# Patient Record
Sex: FEMALE | Race: WHITE | Employment: UNEMPLOYED | URBAN - METROPOLITAN AREA
[De-identification: names, ages, dates, MRNs, and addresses within clinical notes are randomized per-mention and may not be internally consistent; named-entity substitution may affect disease eponyms.]

---

## 2017-07-31 RX ORDER — SIMVASTATIN 20 MG
TABLET ORAL
Qty: 90 TABLET | Refills: 3 | Status: SHIPPED | OUTPATIENT
Start: 2017-07-31 | End: 2021-08-19

## 2017-09-22 ENCOUNTER — OFFICE VISIT (OUTPATIENT)
Dept: FAMILY MEDICINE CLINIC | Age: 47
End: 2017-09-22

## 2017-09-22 VITALS
HEIGHT: 69 IN | RESPIRATION RATE: 18 BRPM | TEMPERATURE: 98.6 F | HEART RATE: 83 BPM | WEIGHT: 271 LBS | BODY MASS INDEX: 40.14 KG/M2 | DIASTOLIC BLOOD PRESSURE: 84 MMHG | SYSTOLIC BLOOD PRESSURE: 132 MMHG | OXYGEN SATURATION: 98 %

## 2017-09-22 DIAGNOSIS — Z00.00 WELLNESS EXAMINATION: Primary | ICD-10-CM

## 2017-09-22 PROCEDURE — 99396 PREV VISIT EST AGE 40-64: CPT | Performed by: NURSE PRACTITIONER

## 2017-09-22 RX ORDER — FUROSEMIDE 40 MG/1
TABLET ORAL
COMMUNITY
Start: 2017-07-14 | End: 2021-08-19

## 2017-09-22 ASSESSMENT — PATIENT HEALTH QUESTIONNAIRE - PHQ9
SUM OF ALL RESPONSES TO PHQ9 QUESTIONS 1 & 2: 0
SUM OF ALL RESPONSES TO PHQ QUESTIONS 1-9: 0
2. FEELING DOWN, DEPRESSED OR HOPELESS: 0
1. LITTLE INTEREST OR PLEASURE IN DOING THINGS: 0

## 2017-10-01 ASSESSMENT — ENCOUNTER SYMPTOMS
EYE ITCHING: 0
WHEEZING: 0
SINUS PRESSURE: 0
ABDOMINAL PAIN: 0
COUGH: 0
CHEST TIGHTNESS: 0
SHORTNESS OF BREATH: 0
DIARRHEA: 0
NAUSEA: 0
ABDOMINAL DISTENTION: 0
BLOOD IN STOOL: 0
EYE PAIN: 0
CONSTIPATION: 0
SORE THROAT: 0
COLOR CHANGE: 0

## 2017-10-02 NOTE — PROGRESS NOTES
anti-rejection medications. Has been doing well with this. No longer smokes and does not drink alcohol. Hemoglobin A1C (%)   Date Value   07/08/2017 5.5   01/14/2017 5.7   10/08/2016 5.6             ( goal A1C is < 7)   No results found for: LABMICR  LDL Cholesterol (mg/dL)   Date Value   09/09/2017 146 (H)   08/05/2017 124   07/08/2017 138 (H)       (goal LDL is <100)   AST (IU/L)   Date Value   09/09/2017 17     ALT (IU/L)   Date Value   09/09/2017 21     BUN (mg/dL)   Date Value   09/09/2017 18     BP Readings from Last 3 Encounters:   09/22/17 132/84   11/11/16 134/81   12/08/15 120/78          (goal 120/80)    Past Medical History:   Diagnosis Date    Hemodialysis access site with arteriovenous graft (HCC)     Polycystic kidney disease     bilateral      Past Surgical History:   Procedure Laterality Date    CHOLECYSTECTOMY      HERNIA REPAIR      HYSTERECTOMY      KIDNEY TRANSPLANT      MANDIBLE FRACTURE SURGERY      PARATHYROID GLAND SURGERY      TOTAL NEPHRECTOMY         Family History   Problem Relation Age of Onset    Kidney Disease Mother      polycystic kidney disease.      High Blood Pressure Father     Cancer Maternal Uncle      nonhodekins lymphoma       Social History   Substance Use Topics    Smoking status: Former Smoker    Smokeless tobacco: Not on file    Alcohol use No      Current Outpatient Prescriptions   Medication Sig Dispense Refill    furosemide (LASIX) 40 MG tablet       simvastatin (ZOCOR) 20 MG tablet TAKE 1 TABLET DAILY AT BEDTIME 90 tablet 3    tacrolimus (PROGRAF) 0.5 MG capsule Take 1 capsule by mouth 2 times daily      aspirin 81 MG tablet Take 81 mg by mouth daily      cloNIDine (CATAPRES) 0.1 MG tablet Take 0.1 mg by mouth 2 times daily      sertraline (ZOLOFT) 100 MG tablet Take 100 mg by mouth daily      carvedilol (COREG) 3.125 MG tablet Take 3.125 mg by mouth 2 times daily (with meals)      tacrolimus (PROGRAF) 1 MG capsule Take 1 mg by mouth 2 times daily      mycophenolate (MYFORTIC) 180 MG DR tablet Take 360 mg by mouth 2 times daily 2 tablets twice daily       No current facility-administered medications for this visit. Allergies   Allergen Reactions    Chloraprep One Step [Chlorhexidine Gluconate]     Dust Mite Extract     Morphine        Health Maintenance   Topic Date Due    HIV screen  08/18/1983    DTaP/Tdap/Td vaccine (1 - Tdap) 08/18/1987    Pneumococcal highest risk (1 of 3 - PCV13) 08/18/1987    Cervical cancer screen  08/18/1989    Flu vaccine (1) 09/01/2017    Diabetes screen  07/08/2020    Lipid screen  09/09/2022       Subjective:      Review of Systems   Constitutional: Negative for activity change and appetite change. HENT: Negative for congestion, ear pain, hearing loss, sinus pressure, sore throat and tinnitus. Eyes: Negative for pain, itching and visual disturbance. Respiratory: Negative for cough, chest tightness, shortness of breath and wheezing. Cardiovascular: Negative for chest pain, palpitations and leg swelling. Gastrointestinal: Negative for abdominal distention, abdominal pain, blood in stool, constipation, diarrhea and nausea. Endocrine: Negative for cold intolerance, heat intolerance, polydipsia, polyphagia and polyuria. Genitourinary: Negative for dysuria, flank pain, frequency and urgency. Musculoskeletal: Negative for arthralgias, gait problem and myalgias. Skin: Negative for color change, rash and wound. Allergic/Immunologic: Negative for environmental allergies and food allergies. Neurological: Negative for speech difficulty, weakness, light-headedness and headaches. Hematological: Does not bruise/bleed easily. Psychiatric/Behavioral: Negative for decreased concentration and sleep disturbance. The patient is not nervous/anxious.         Objective:     /84 (Site: Right Arm, Position: Sitting, Cuff Size: Large Adult)  Pulse 83  Temp 98.6 °F (37 °C) (Temporal)   Resp 18  Ht Patient given educational materials - see patient instructions. Discussed use, benefit, and side effects of prescribed medications. All patient questions answered. Pt voiced understanding. Reviewed health maintenance. Instructed to continue current medications, diet and exercise. Patient agreed with treatment plan. Follow up as directed.      Electronically signed by Nehemias Chan CNP on 10/1/2017

## 2018-08-15 ENCOUNTER — OFFICE VISIT (OUTPATIENT)
Dept: FAMILY MEDICINE CLINIC | Age: 48
End: 2018-08-15
Payer: COMMERCIAL

## 2018-08-15 VITALS
HEART RATE: 91 BPM | SYSTOLIC BLOOD PRESSURE: 132 MMHG | WEIGHT: 268.6 LBS | DIASTOLIC BLOOD PRESSURE: 78 MMHG | TEMPERATURE: 97.8 F | HEIGHT: 69 IN | BODY MASS INDEX: 39.78 KG/M2 | OXYGEN SATURATION: 98 % | RESPIRATION RATE: 20 BRPM

## 2018-08-15 DIAGNOSIS — M79.672 LEFT FOOT PAIN: Primary | ICD-10-CM

## 2018-08-15 PROCEDURE — 99213 OFFICE O/P EST LOW 20 MIN: CPT | Performed by: NURSE PRACTITIONER

## 2018-08-15 ASSESSMENT — PATIENT HEALTH QUESTIONNAIRE - PHQ9
SUM OF ALL RESPONSES TO PHQ QUESTIONS 1-9: 0
1. LITTLE INTEREST OR PLEASURE IN DOING THINGS: 0
SUM OF ALL RESPONSES TO PHQ9 QUESTIONS 1 & 2: 0
2. FEELING DOWN, DEPRESSED OR HOPELESS: 0
SUM OF ALL RESPONSES TO PHQ QUESTIONS 1-9: 0

## 2018-08-17 NOTE — PROGRESS NOTES
normal heart sounds. Exam reveals no gallop and no friction rub. No murmur heard. Pulmonary/Chest: Effort normal and breath sounds normal. No respiratory distress. She has no wheezes. She has no rales. She exhibits no tenderness. Abdominal: Soft. Bowel sounds are normal. She exhibits no distension. There is no splenomegaly or hepatomegaly. There is no tenderness. No hernia. Musculoskeletal: Normal range of motion. She exhibits no edema or tenderness. Feet:    Lymphadenopathy:     She has no cervical adenopathy. Neurological: She is alert and oriented to person, place, and time. Coordination and gait normal.   Skin: Skin is warm and dry. No rash noted. No erythema. Psychiatric: She has a normal mood and affect. Her speech is normal and behavior is normal. Judgment and thought content normal. Cognition and memory are normal.   Nursing note and vitals reviewed. Assessment:      1. Left foot pain                     Plan:    Left foot pain:  Get x-ray of foot. Take ibuprofen to help with the discomfort. May need to refer to podiatry. Orders Placed This Encounter   Procedures    XR FOOT LEFT (MIN 3 VIEWS)     Standing Status:   Future     Standing Expiration Date:   8/15/2019     Order Specific Question:   Reason for exam:     Answer:   pain for two weeks     No orders of the defined types were placed in this encounter. No Follow-up on file. Patient given educational materials - see patient instructions. Discussed use, benefit, and side effects of prescribed medications. All patient questions answered. Pt voiced understanding. Reviewed health maintenance. Instructed to continue current medications, diet and exercise. Patient agreed with treatment plan. Follow up as directed.      Electronically signed by GOMEZ Weller CNP on 8/17/2018

## 2018-08-18 ASSESSMENT — ENCOUNTER SYMPTOMS
SINUS PRESSURE: 0
NAUSEA: 0
ABDOMINAL PAIN: 0
SORE THROAT: 0
COLOR CHANGE: 0
COUGH: 0
BLOOD IN STOOL: 0
CONSTIPATION: 0
DIARRHEA: 0
SHORTNESS OF BREATH: 0
CHEST TIGHTNESS: 0
ABDOMINAL DISTENTION: 0
EYE ITCHING: 0
WHEEZING: 0
EYE PAIN: 0

## 2018-09-08 ENCOUNTER — HOSPITAL ENCOUNTER (OUTPATIENT)
Age: 48
Discharge: HOME | End: 2018-09-10
Payer: COMMERCIAL

## 2018-09-08 ENCOUNTER — HOSPITAL ENCOUNTER (OUTPATIENT)
Age: 48
Discharge: HOME | End: 2018-09-08
Payer: COMMERCIAL

## 2018-09-08 ENCOUNTER — HOSPITAL ENCOUNTER (OUTPATIENT)
Dept: GENERAL RADIOLOGY | Age: 48
Discharge: HOME | End: 2018-09-10
Payer: COMMERCIAL

## 2018-09-08 DIAGNOSIS — M79.672 LEFT FOOT PAIN: ICD-10-CM

## 2018-09-08 LAB
ABSOLUTE EOS #: 0.2 K/UL (ref 0–0.4)
ABSOLUTE IMMATURE GRANULOCYTE: NORMAL K/UL (ref 0–0.3)
ABSOLUTE LYMPH #: 1.4 K/UL (ref 1–4.8)
ABSOLUTE MONO #: 0.3 K/UL (ref 0.1–1.3)
ALBUMIN SERPL-MCNC: 4.1 G/DL (ref 3.5–5.2)
ALBUMIN/GLOBULIN RATIO: ABNORMAL (ref 1–2.5)
ALP BLD-CCNC: 72 U/L (ref 35–104)
ALT SERPL-CCNC: 16 U/L (ref 5–33)
ANION GAP SERPL CALCULATED.3IONS-SCNC: 12 MMOL/L (ref 9–17)
AST SERPL-CCNC: 14 U/L
BASOPHILS # BLD: 1 % (ref 0–2)
BASOPHILS ABSOLUTE: 0 K/UL (ref 0–0.2)
BILIRUB SERPL-MCNC: 0.4 MG/DL (ref 0.3–1.2)
BUN BLDV-MCNC: 17 MG/DL (ref 6–20)
BUN/CREAT BLD: ABNORMAL (ref 9–20)
CALCIUM SERPL-MCNC: 9.7 MG/DL (ref 8.6–10.4)
CHLORIDE BLD-SCNC: 104 MMOL/L (ref 98–107)
CHOLESTEROL/HDL RATIO: 5.5
CHOLESTEROL: 242 MG/DL
CO2: 25 MMOL/L (ref 20–31)
CREAT SERPL-MCNC: 1.09 MG/DL (ref 0.5–0.9)
DIFFERENTIAL TYPE: NORMAL
EOSINOPHILS RELATIVE PERCENT: 4 % (ref 0–4)
GFR AFRICAN AMERICAN: >60 ML/MIN
GFR NON-AFRICAN AMERICAN: 53 ML/MIN
GFR SERPL CREATININE-BSD FRML MDRD: ABNORMAL ML/MIN/{1.73_M2}
GFR SERPL CREATININE-BSD FRML MDRD: ABNORMAL ML/MIN/{1.73_M2}
GLUCOSE BLD-MCNC: 123 MG/DL (ref 70–99)
HCT VFR BLD CALC: 39.7 % (ref 36–46)
HDLC SERPL-MCNC: 44 MG/DL
HEMOGLOBIN: 13.3 G/DL (ref 12–16)
IMMATURE GRANULOCYTES: NORMAL %
LDL CHOLESTEROL: 166 MG/DL (ref 0–130)
LYMPHOCYTES # BLD: 28 % (ref 24–44)
MAGNESIUM: 1.6 MG/DL (ref 1.6–2.6)
MCH RBC QN AUTO: 30 PG (ref 26–34)
MCHC RBC AUTO-ENTMCNC: 33.6 G/DL (ref 31–37)
MCV RBC AUTO: 89.4 FL (ref 80–100)
MONOCYTES # BLD: 6 % (ref 1–7)
NRBC AUTOMATED: NORMAL PER 100 WBC
PDW BLD-RTO: 13.3 % (ref 11.5–14.9)
PHOSPHORUS: 3.2 MG/DL (ref 2.6–4.5)
PLATELET # BLD: 179 K/UL (ref 150–450)
PLATELET ESTIMATE: NORMAL
PMV BLD AUTO: 8.4 FL (ref 6–12)
POTASSIUM SERPL-SCNC: 4.6 MMOL/L (ref 3.7–5.3)
RBC # BLD: 4.44 M/UL (ref 4–5.2)
RBC # BLD: NORMAL 10*6/UL
SEG NEUTROPHILS: 61 % (ref 36–66)
SEGMENTED NEUTROPHILS ABSOLUTE COUNT: 3.2 K/UL (ref 1.3–9.1)
SODIUM BLD-SCNC: 141 MMOL/L (ref 135–144)
TOTAL PROTEIN: 7.3 G/DL (ref 6.4–8.3)
TRIGL SERPL-MCNC: 162 MG/DL
URIC ACID: 8.1 MG/DL (ref 2.4–5.7)
VLDLC SERPL CALC-MCNC: ABNORMAL MG/DL (ref 1–30)
WBC # BLD: 5.1 K/UL (ref 3.5–11)
WBC # BLD: NORMAL 10*3/UL

## 2018-09-08 PROCEDURE — 84100 ASSAY OF PHOSPHORUS: CPT

## 2018-09-08 PROCEDURE — 84550 ASSAY OF BLOOD/URIC ACID: CPT

## 2018-09-08 PROCEDURE — 85025 COMPLETE CBC W/AUTO DIFF WBC: CPT

## 2018-09-08 PROCEDURE — 80053 COMPREHEN METABOLIC PANEL: CPT

## 2018-09-08 PROCEDURE — 83735 ASSAY OF MAGNESIUM: CPT

## 2018-09-08 PROCEDURE — 36415 COLL VENOUS BLD VENIPUNCTURE: CPT

## 2018-09-08 PROCEDURE — 73630 X-RAY EXAM OF FOOT: CPT

## 2018-09-08 PROCEDURE — 80061 LIPID PANEL: CPT

## 2018-09-08 PROCEDURE — 87799 DETECT AGENT NOS DNA QUANT: CPT

## 2018-09-08 PROCEDURE — 83036 HEMOGLOBIN GLYCOSYLATED A1C: CPT

## 2018-09-08 PROCEDURE — 80197 ASSAY OF TACROLIMUS: CPT

## 2018-09-09 LAB
ESTIMATED AVERAGE GLUCOSE: 114 MG/DL
HBA1C MFR BLD: 5.6 % (ref 4–6)
PROGRAF: 4.1 NG/ML

## 2018-09-10 LAB
BK VIRUS LOG COPY: <2.6 LOG CPY/ML
BK VIRUS SOURCE: NORMAL
BK VIRUS, BLOOD, COPIES/ML: <390 CPY/ML
INTERPRETATION:: NOT DETECTED

## 2018-09-11 ENCOUNTER — TELEPHONE (OUTPATIENT)
Dept: FAMILY MEDICINE CLINIC | Age: 48
End: 2018-09-11

## 2018-09-11 DIAGNOSIS — E79.0 ELEVATED URIC ACID IN BLOOD: Primary | ICD-10-CM

## 2018-09-11 RX ORDER — ALLOPURINOL 100 MG/1
100 TABLET ORAL DAILY
Qty: 30 TABLET | Refills: 3 | Status: SHIPPED | OUTPATIENT
Start: 2018-09-11 | End: 2019-09-20 | Stop reason: ALTCHOICE

## 2018-09-14 ENCOUNTER — HOSPITAL ENCOUNTER (OUTPATIENT)
Age: 48
Setting detail: SPECIMEN
Discharge: HOME | End: 2018-09-14
Payer: COMMERCIAL

## 2018-09-14 ENCOUNTER — OFFICE VISIT (OUTPATIENT)
Dept: FAMILY MEDICINE CLINIC | Age: 48
End: 2018-09-14
Payer: COMMERCIAL

## 2018-09-14 VITALS
RESPIRATION RATE: 20 BRPM | DIASTOLIC BLOOD PRESSURE: 72 MMHG | SYSTOLIC BLOOD PRESSURE: 124 MMHG | TEMPERATURE: 98.7 F | WEIGHT: 271.8 LBS | HEIGHT: 69 IN | HEART RATE: 87 BPM | BODY MASS INDEX: 40.26 KG/M2 | OXYGEN SATURATION: 97 %

## 2018-09-14 DIAGNOSIS — Z00.00 WELLNESS EXAMINATION: Primary | ICD-10-CM

## 2018-09-14 DIAGNOSIS — Z12.39 SCREENING FOR BREAST CANCER: ICD-10-CM

## 2018-09-14 DIAGNOSIS — Z01.419 PAP TEST, AS PART OF ROUTINE GYNECOLOGICAL EXAMINATION: ICD-10-CM

## 2018-09-14 PROCEDURE — 99396 PREV VISIT EST AGE 40-64: CPT | Performed by: NURSE PRACTITIONER

## 2018-09-14 ASSESSMENT — PATIENT HEALTH QUESTIONNAIRE - PHQ9
SUM OF ALL RESPONSES TO PHQ QUESTIONS 1-9: 0
SUM OF ALL RESPONSES TO PHQ QUESTIONS 1-9: 0
1. LITTLE INTEREST OR PLEASURE IN DOING THINGS: 0
2. FEELING DOWN, DEPRESSED OR HOPELESS: 0
SUM OF ALL RESPONSES TO PHQ9 QUESTIONS 1 & 2: 0

## 2018-09-14 NOTE — PROGRESS NOTES
[facility], [address] [address]  Dept: [phone]  Dept Fax: [phone]    Joey Kumar is a 46 y.o. female who presents today for her medical conditions/complaints as noted below. Joey Kumar is c/o of Gynecologic Exam    Daiana received counseling on the following healthy behaviors: nutrition, exercise and medication adherence  Reviewed prior labs and health maintenance  Continue current medications, diet and exercise. Discussed use, benefit, and side effects of prescribed medications. Barriers to medication compliance addressed. Patient given educational materials - see patient instructions  Was a self-tracking handout given in paper form or via "[employer]t? Yes    Requested Prescriptions      No prescriptions requested or ordered in this encounter       All patient questions answered. Patient voiced understanding. Quality Measures    Body mass index is 40.14 kg/m². Elevated. Weight control planned discussed Healthy diet and regular exercise. BP: 124/72 Blood pressure is normal. Treatment plan consists of No treatment change needed. Lab Results   Component Value Date    LDLCHOLESTEROL 166 (H) 09/08/2018    (goal LDL reduction with dx if diabetes is 50% LDL reduction)      PHQ Scores 9/14/2018 8/15/2018 9/22/2017   PHQ2 Score 0 0 0   PHQ9 Score 0 0 0     Interpretation of Total Score Depression Severity: 1-4 = Minimal depression, 5-9 = Mild depression, 10-14 = Moderate depression, 15-19 = Moderately severe depression, 20-27 = Severe depression          HPI:     Presents to office for wellness exam and pap test. Denies any problems at this time. Does have a history of a hysterectomy. She also has a history of polycystic kidney disease and had a renal transplant. Says she is doing well at this time. Does take chronic medications, anti-rejection medication, gout medication due to high uric acid, furosemide, simvastatin, carvedilol, and clonidine. Negative for abdominal distention, abdominal pain, blood in stool, constipation, diarrhea and nausea. Endocrine: Negative for cold intolerance, heat intolerance, polydipsia, polyphagia and polyuria. Genitourinary: Negative for dyspareunia, dysuria, flank pain, frequency, urgency, vaginal bleeding, vaginal discharge and vaginal pain. Musculoskeletal: Negative for arthralgias, gait problem and myalgias. Skin: Negative for color change, rash and wound. Allergic/Immunologic: Negative for environmental allergies and food allergies. Neurological: Negative for speech difficulty, weakness, light-headedness and headaches. Hematological: Does not bruise/bleed easily. Psychiatric/Behavioral: Negative for decreased concentration and sleep disturbance. The patient is not nervous/anxious. Objective:     /72 (Site: Right Upper Arm, Position: Sitting, Cuff Size: Large Adult)   Pulse 87   Temp 98.7 °F (37.1 °C) (Temporal)   Resp 20   Ht 5' 9\" (1.753 m)   Wt 271 lb 12.8 oz (123.3 kg)   SpO2 97%   BMI 40.14 kg/m²   Physical Exam   Constitutional: She is oriented to person, place, and time. She appears well-developed and well-nourished. HENT:   Head: Normocephalic. Right Ear: External ear normal.   Left Ear: External ear normal.   Nose: Nose normal.   Mouth/Throat: Oropharynx is clear and moist.   Eyes: Conjunctivae and EOM are normal.   Neck: Normal range of motion. Neck supple. No thyromegaly present. Cardiovascular: Normal rate, regular rhythm and normal heart sounds. PMI is not displaced. Exam reveals no gallop and no friction rub. No murmur heard. Pulmonary/Chest: Effort normal and breath sounds normal. No respiratory distress. She has no wheezes. She has no rales. She exhibits no tenderness. Abdominal: Soft. Bowel sounds are normal. She exhibits no distension. There is no splenomegaly or hepatomegaly. There is no tenderness. No hernia.  Hernia confirmed negative in the right inguinal area and confirmed negative in the left inguinal area. Genitourinary: Rectum normal. No breast swelling, tenderness, discharge or bleeding. No labial fusion. There is no rash, tenderness or lesion on the right labia. There is no rash, tenderness, lesion or injury on the left labia. Cervix exhibits no motion tenderness, no discharge and no friability. No erythema, tenderness or bleeding in the vagina. No foreign body in the vagina. No signs of injury around the vagina. No vaginal discharge found. Musculoskeletal: Normal range of motion. She exhibits no edema or tenderness. Lymphadenopathy:     She has no cervical adenopathy. Neurological: She is alert and oriented to person, place, and time. Coordination and gait normal.   Skin: Skin is warm and dry. No rash noted. No erythema. Psychiatric: She has a normal mood and affect. Her speech is normal and behavior is normal. Judgment and thought content normal. Cognition and memory are normal.   Nursing note and vitals reviewed. Assessment:      1. Wellness examination    2. Pap test, as part of routine gynecological examination    3. Screening for lipoid disorders                     Plan:     Well Exam:  Get plenty of rest every night 7-9 hours  Drink plenty of fluids (8-8oz glasses) daily  MVI daily  Exercise at least 30 minutes 3-5 times a week  Limit intake of alcohol  Eat 3 healthy meals a day  Watch intake of excessive carbohydrates and saturated fats  Pap smear:  Will call with results as soon as they are received. May have some slight spotting of blood today. If excessive bleeding, call office or go to ED  Get mammogram as ordered  No orders of the defined types were placed in this encounter. No orders of the defined types were placed in this encounter. No Follow-up on file. Patient given educational materials - see patient instructions. Discussed use, benefit, and side effects of prescribed medications.   All

## 2018-09-23 ASSESSMENT — ENCOUNTER SYMPTOMS
CONSTIPATION: 0
SORE THROAT: 0
CHEST TIGHTNESS: 0
ABDOMINAL PAIN: 0
NAUSEA: 0
BLOOD IN STOOL: 0
WHEEZING: 0
COLOR CHANGE: 0
SINUS PRESSURE: 0
COUGH: 0
SHORTNESS OF BREATH: 0
EYE PAIN: 0
DIARRHEA: 0
EYE ITCHING: 0
ABDOMINAL DISTENTION: 0

## 2018-09-28 LAB — CYTOLOGY REPORT: NORMAL

## 2018-10-13 ENCOUNTER — HOSPITAL ENCOUNTER (OUTPATIENT)
Age: 48
Discharge: HOME | End: 2018-10-13
Payer: COMMERCIAL

## 2018-10-13 LAB
ABSOLUTE EOS #: 0.2 K/UL (ref 0–0.4)
ABSOLUTE IMMATURE GRANULOCYTE: NORMAL K/UL (ref 0–0.3)
ABSOLUTE LYMPH #: 1.5 K/UL (ref 1–4.8)
ABSOLUTE MONO #: 0.4 K/UL (ref 0.1–1.3)
ALBUMIN SERPL-MCNC: 3.9 G/DL (ref 3.5–5.2)
ALBUMIN/GLOBULIN RATIO: ABNORMAL (ref 1–2.5)
ALP BLD-CCNC: 71 U/L (ref 35–104)
ALT SERPL-CCNC: 15 U/L (ref 5–33)
ANION GAP SERPL CALCULATED.3IONS-SCNC: 9 MMOL/L (ref 9–17)
AST SERPL-CCNC: 12 U/L
BASOPHILS # BLD: 1 % (ref 0–2)
BASOPHILS ABSOLUTE: 0 K/UL (ref 0–0.2)
BILIRUB SERPL-MCNC: 0.41 MG/DL (ref 0.3–1.2)
BUN BLDV-MCNC: 16 MG/DL (ref 6–20)
BUN/CREAT BLD: ABNORMAL (ref 9–20)
CALCIUM SERPL-MCNC: 9 MG/DL (ref 8.6–10.4)
CHLORIDE BLD-SCNC: 106 MMOL/L (ref 98–107)
CHOLESTEROL/HDL RATIO: 5.2
CHOLESTEROL: 217 MG/DL
CO2: 25 MMOL/L (ref 20–31)
CREAT SERPL-MCNC: 0.96 MG/DL (ref 0.5–0.9)
DIFFERENTIAL TYPE: NORMAL
EOSINOPHILS RELATIVE PERCENT: 4 % (ref 0–4)
GFR AFRICAN AMERICAN: >60 ML/MIN
GFR NON-AFRICAN AMERICAN: >60 ML/MIN
GFR SERPL CREATININE-BSD FRML MDRD: ABNORMAL ML/MIN/{1.73_M2}
GFR SERPL CREATININE-BSD FRML MDRD: ABNORMAL ML/MIN/{1.73_M2}
GLUCOSE BLD-MCNC: 115 MG/DL (ref 70–99)
HCT VFR BLD CALC: 39.1 % (ref 36–46)
HDLC SERPL-MCNC: 42 MG/DL
HEMOGLOBIN: 13.3 G/DL (ref 12–16)
IMMATURE GRANULOCYTES: NORMAL %
LDL CHOLESTEROL: 139 MG/DL (ref 0–130)
LYMPHOCYTES # BLD: 28 % (ref 24–44)
MAGNESIUM: 1.5 MG/DL (ref 1.6–2.6)
MCH RBC QN AUTO: 29.8 PG (ref 26–34)
MCHC RBC AUTO-ENTMCNC: 33.9 G/DL (ref 31–37)
MCV RBC AUTO: 87.8 FL (ref 80–100)
MONOCYTES # BLD: 7 % (ref 1–7)
NRBC AUTOMATED: NORMAL PER 100 WBC
PDW BLD-RTO: 13 % (ref 11.5–14.9)
PHOSPHORUS: 2.4 MG/DL (ref 2.6–4.5)
PLATELET # BLD: 180 K/UL (ref 150–450)
PLATELET ESTIMATE: NORMAL
PMV BLD AUTO: 8.2 FL (ref 6–12)
POTASSIUM SERPL-SCNC: 4.5 MMOL/L (ref 3.7–5.3)
RBC # BLD: 4.46 M/UL (ref 4–5.2)
RBC # BLD: NORMAL 10*6/UL
SEG NEUTROPHILS: 60 % (ref 36–66)
SEGMENTED NEUTROPHILS ABSOLUTE COUNT: 3.4 K/UL (ref 1.3–9.1)
SODIUM BLD-SCNC: 140 MMOL/L (ref 135–144)
TOTAL PROTEIN: 7.2 G/DL (ref 6.4–8.3)
TRIGL SERPL-MCNC: 182 MG/DL
URIC ACID: 7 MG/DL (ref 2.4–5.7)
VLDLC SERPL CALC-MCNC: ABNORMAL MG/DL (ref 1–30)
WBC # BLD: 5.6 K/UL (ref 3.5–11)
WBC # BLD: NORMAL 10*3/UL

## 2018-10-13 PROCEDURE — 84100 ASSAY OF PHOSPHORUS: CPT

## 2018-10-13 PROCEDURE — 84550 ASSAY OF BLOOD/URIC ACID: CPT

## 2018-10-13 PROCEDURE — 80061 LIPID PANEL: CPT

## 2018-10-13 PROCEDURE — 80197 ASSAY OF TACROLIMUS: CPT

## 2018-10-13 PROCEDURE — 83735 ASSAY OF MAGNESIUM: CPT

## 2018-10-13 PROCEDURE — 85025 COMPLETE CBC W/AUTO DIFF WBC: CPT

## 2018-10-13 PROCEDURE — 36415 COLL VENOUS BLD VENIPUNCTURE: CPT

## 2018-10-13 PROCEDURE — 80053 COMPREHEN METABOLIC PANEL: CPT

## 2018-10-17 LAB — PROGRAF: 4.9 NG/ML

## 2018-12-21 ENCOUNTER — HOSPITAL ENCOUNTER (OUTPATIENT)
Age: 48
Discharge: HOME | End: 2018-12-21
Payer: COMMERCIAL

## 2018-12-21 LAB
ABSOLUTE EOS #: 0.2 K/UL (ref 0–0.4)
ABSOLUTE IMMATURE GRANULOCYTE: NORMAL K/UL (ref 0–0.3)
ABSOLUTE LYMPH #: 1.4 K/UL (ref 1–4.8)
ABSOLUTE MONO #: 0.4 K/UL (ref 0.1–1.3)
ALBUMIN SERPL-MCNC: 4 G/DL (ref 3.5–5.2)
ALBUMIN/GLOBULIN RATIO: ABNORMAL (ref 1–2.5)
ALP BLD-CCNC: 66 U/L (ref 35–104)
ALT SERPL-CCNC: 16 U/L (ref 5–33)
ANION GAP SERPL CALCULATED.3IONS-SCNC: 10 MMOL/L (ref 9–17)
AST SERPL-CCNC: 15 U/L
BASOPHILS # BLD: 1 % (ref 0–2)
BASOPHILS ABSOLUTE: 0 K/UL (ref 0–0.2)
BILIRUB SERPL-MCNC: 0.52 MG/DL (ref 0.3–1.2)
BILIRUBIN DIRECT: 0.12 MG/DL
BILIRUBIN, INDIRECT: 0.4 MG/DL (ref 0–1)
BUN BLDV-MCNC: 16 MG/DL (ref 6–20)
CALCIUM SERPL-MCNC: 9 MG/DL (ref 8.6–10.4)
CHLORIDE BLD-SCNC: 103 MMOL/L (ref 98–107)
CHOLESTEROL/HDL RATIO: 5.9
CHOLESTEROL: 224 MG/DL
CO2: 25 MMOL/L (ref 20–31)
CREAT SERPL-MCNC: 1.01 MG/DL (ref 0.5–0.9)
DIFFERENTIAL TYPE: NORMAL
EOSINOPHILS RELATIVE PERCENT: 4 % (ref 0–4)
GFR AFRICAN AMERICAN: >60 ML/MIN
GFR NON-AFRICAN AMERICAN: 58 ML/MIN
GFR SERPL CREATININE-BSD FRML MDRD: ABNORMAL ML/MIN/{1.73_M2}
GFR SERPL CREATININE-BSD FRML MDRD: ABNORMAL ML/MIN/{1.73_M2}
GLUCOSE BLD-MCNC: 118 MG/DL (ref 70–99)
HCT VFR BLD CALC: 41 % (ref 36–46)
HDLC SERPL-MCNC: 38 MG/DL
HEMOGLOBIN: 13.7 G/DL (ref 12–16)
IMMATURE GRANULOCYTES: NORMAL %
LDL CHOLESTEROL: 145 MG/DL (ref 0–130)
LYMPHOCYTES # BLD: 28 % (ref 24–44)
MAGNESIUM: 1.7 MG/DL (ref 1.6–2.6)
MCH RBC QN AUTO: 29.4 PG (ref 26–34)
MCHC RBC AUTO-ENTMCNC: 33.4 G/DL (ref 31–37)
MCV RBC AUTO: 88.3 FL (ref 80–100)
MONOCYTES # BLD: 7 % (ref 1–7)
NRBC AUTOMATED: NORMAL PER 100 WBC
PDW BLD-RTO: 13.1 % (ref 11.5–14.9)
PHOSPHORUS: 2.4 MG/DL (ref 2.6–4.5)
PLATELET # BLD: 184 K/UL (ref 150–450)
PLATELET ESTIMATE: NORMAL
PMV BLD AUTO: 8.5 FL (ref 6–12)
POTASSIUM SERPL-SCNC: 5 MMOL/L (ref 3.7–5.3)
RBC # BLD: 4.64 M/UL (ref 4–5.2)
RBC # BLD: NORMAL 10*6/UL
SEG NEUTROPHILS: 60 % (ref 36–66)
SEGMENTED NEUTROPHILS ABSOLUTE COUNT: 3.1 K/UL (ref 1.3–9.1)
SODIUM BLD-SCNC: 138 MMOL/L (ref 135–144)
TOTAL PROTEIN: 7.3 G/DL (ref 6.4–8.3)
TRIGL SERPL-MCNC: 205 MG/DL
URIC ACID: 7.8 MG/DL (ref 2.4–5.7)
VLDLC SERPL CALC-MCNC: ABNORMAL MG/DL (ref 1–30)
WBC # BLD: 5.1 K/UL (ref 3.5–11)
WBC # BLD: NORMAL 10*3/UL

## 2018-12-21 PROCEDURE — 82248 BILIRUBIN DIRECT: CPT

## 2018-12-21 PROCEDURE — 85025 COMPLETE CBC W/AUTO DIFF WBC: CPT

## 2018-12-21 PROCEDURE — 36415 COLL VENOUS BLD VENIPUNCTURE: CPT

## 2018-12-21 PROCEDURE — 80061 LIPID PANEL: CPT

## 2018-12-21 PROCEDURE — 80053 COMPREHEN METABOLIC PANEL: CPT

## 2018-12-21 PROCEDURE — 80197 ASSAY OF TACROLIMUS: CPT

## 2018-12-21 PROCEDURE — 84100 ASSAY OF PHOSPHORUS: CPT

## 2018-12-21 PROCEDURE — 83735 ASSAY OF MAGNESIUM: CPT

## 2018-12-21 PROCEDURE — 84550 ASSAY OF BLOOD/URIC ACID: CPT

## 2018-12-24 LAB — PROGRAF: 5.2 NG/ML

## 2019-02-16 ENCOUNTER — HOSPITAL ENCOUNTER (OUTPATIENT)
Age: 49
Discharge: HOME | End: 2019-02-16
Payer: COMMERCIAL

## 2019-02-16 LAB
ABSOLUTE EOS #: 0.2 K/UL (ref 0–0.4)
ABSOLUTE IMMATURE GRANULOCYTE: NORMAL K/UL (ref 0–0.3)
ABSOLUTE LYMPH #: 1.3 K/UL (ref 1–4.8)
ABSOLUTE MONO #: 0.3 K/UL (ref 0.1–1.3)
ALBUMIN SERPL-MCNC: 3.8 G/DL (ref 3.5–5.2)
ALBUMIN/GLOBULIN RATIO: ABNORMAL (ref 1–2.5)
ALP BLD-CCNC: 66 U/L (ref 35–104)
ALT SERPL-CCNC: 15 U/L (ref 5–33)
ANION GAP SERPL CALCULATED.3IONS-SCNC: 11 MMOL/L (ref 9–17)
AST SERPL-CCNC: 13 U/L
BASOPHILS # BLD: 1 % (ref 0–2)
BASOPHILS ABSOLUTE: 0 K/UL (ref 0–0.2)
BILIRUB SERPL-MCNC: 0.59 MG/DL (ref 0.3–1.2)
BUN BLDV-MCNC: 14 MG/DL (ref 6–20)
BUN/CREAT BLD: ABNORMAL (ref 9–20)
CALCIUM SERPL-MCNC: 9.5 MG/DL (ref 8.6–10.4)
CHLORIDE BLD-SCNC: 104 MMOL/L (ref 98–107)
CHOLESTEROL/HDL RATIO: 5.4
CHOLESTEROL: 234 MG/DL
CO2: 23 MMOL/L (ref 20–31)
CREAT SERPL-MCNC: 1 MG/DL (ref 0.5–0.9)
DIFFERENTIAL TYPE: NORMAL
EOSINOPHILS RELATIVE PERCENT: 4 % (ref 0–4)
GFR AFRICAN AMERICAN: >60 ML/MIN
GFR NON-AFRICAN AMERICAN: 59 ML/MIN
GFR SERPL CREATININE-BSD FRML MDRD: ABNORMAL ML/MIN/{1.73_M2}
GFR SERPL CREATININE-BSD FRML MDRD: ABNORMAL ML/MIN/{1.73_M2}
GLUCOSE BLD-MCNC: 125 MG/DL (ref 70–99)
HCT VFR BLD CALC: 41 % (ref 36–46)
HDLC SERPL-MCNC: 43 MG/DL
HEMOGLOBIN: 13.6 G/DL (ref 12–16)
IMMATURE GRANULOCYTES: NORMAL %
LDL CHOLESTEROL: 157 MG/DL (ref 0–130)
LYMPHOCYTES # BLD: 25 % (ref 24–44)
MAGNESIUM: 1.5 MG/DL (ref 1.6–2.6)
MCH RBC QN AUTO: 29.5 PG (ref 26–34)
MCHC RBC AUTO-ENTMCNC: 33.2 G/DL (ref 31–37)
MCV RBC AUTO: 88.8 FL (ref 80–100)
MONOCYTES # BLD: 6 % (ref 1–7)
NRBC AUTOMATED: NORMAL PER 100 WBC
PDW BLD-RTO: 13.2 % (ref 11.5–14.9)
PHOSPHORUS: 2.8 MG/DL (ref 2.6–4.5)
PLATELET # BLD: 188 K/UL (ref 150–450)
PLATELET ESTIMATE: NORMAL
PMV BLD AUTO: 8.3 FL (ref 6–12)
POTASSIUM SERPL-SCNC: 4.4 MMOL/L (ref 3.7–5.3)
RBC # BLD: 4.62 M/UL (ref 4–5.2)
RBC # BLD: NORMAL 10*6/UL
SEG NEUTROPHILS: 64 % (ref 36–66)
SEGMENTED NEUTROPHILS ABSOLUTE COUNT: 3.2 K/UL (ref 1.3–9.1)
SODIUM BLD-SCNC: 138 MMOL/L (ref 135–144)
TOTAL PROTEIN: 7.1 G/DL (ref 6.4–8.3)
TRIGL SERPL-MCNC: 170 MG/DL
URIC ACID: 7.6 MG/DL (ref 2.4–5.7)
VLDLC SERPL CALC-MCNC: ABNORMAL MG/DL (ref 1–30)
WBC # BLD: 5 K/UL (ref 3.5–11)
WBC # BLD: NORMAL 10*3/UL

## 2019-02-16 PROCEDURE — 80053 COMPREHEN METABOLIC PANEL: CPT

## 2019-02-16 PROCEDURE — 85025 COMPLETE CBC W/AUTO DIFF WBC: CPT

## 2019-02-16 PROCEDURE — 80061 LIPID PANEL: CPT

## 2019-02-16 PROCEDURE — 83735 ASSAY OF MAGNESIUM: CPT

## 2019-02-16 PROCEDURE — 80197 ASSAY OF TACROLIMUS: CPT

## 2019-02-16 PROCEDURE — 36415 COLL VENOUS BLD VENIPUNCTURE: CPT

## 2019-02-16 PROCEDURE — 84550 ASSAY OF BLOOD/URIC ACID: CPT

## 2019-02-16 PROCEDURE — 84100 ASSAY OF PHOSPHORUS: CPT

## 2019-02-18 LAB — PROGRAF: 4.9 NG/ML

## 2019-03-23 ENCOUNTER — HOSPITAL ENCOUNTER (OUTPATIENT)
Age: 49
Discharge: HOME | End: 2019-03-23
Payer: COMMERCIAL

## 2019-03-23 LAB
ABSOLUTE EOS #: 0.1 K/UL (ref 0–0.4)
ABSOLUTE IMMATURE GRANULOCYTE: NORMAL K/UL (ref 0–0.3)
ABSOLUTE LYMPH #: 1.2 K/UL (ref 1–4.8)
ABSOLUTE MONO #: 0.3 K/UL (ref 0.1–1.3)
ALBUMIN SERPL-MCNC: 3.9 G/DL (ref 3.5–5.2)
ALBUMIN/GLOBULIN RATIO: ABNORMAL (ref 1–2.5)
ALP BLD-CCNC: 69 U/L (ref 35–104)
ALT SERPL-CCNC: 15 U/L (ref 5–33)
ANION GAP SERPL CALCULATED.3IONS-SCNC: 11 MMOL/L (ref 9–17)
AST SERPL-CCNC: 13 U/L
BASOPHILS # BLD: 1 % (ref 0–2)
BASOPHILS ABSOLUTE: 0 K/UL (ref 0–0.2)
BILIRUB SERPL-MCNC: 0.62 MG/DL (ref 0.3–1.2)
BILIRUBIN DIRECT: 0.15 MG/DL
BILIRUBIN, INDIRECT: 0.47 MG/DL (ref 0–1)
BUN BLDV-MCNC: 13 MG/DL (ref 6–20)
CALCIUM SERPL-MCNC: 9.4 MG/DL (ref 8.6–10.4)
CHLORIDE BLD-SCNC: 105 MMOL/L (ref 98–107)
CHOLESTEROL, FASTING: 225 MG/DL
CHOLESTEROL/HDL RATIO: 5.2
CO2: 22 MMOL/L (ref 20–31)
CREAT SERPL-MCNC: 0.95 MG/DL (ref 0.5–0.9)
DIFFERENTIAL TYPE: NORMAL
EOSINOPHILS RELATIVE PERCENT: 3 % (ref 0–4)
GFR AFRICAN AMERICAN: >60 ML/MIN
GFR NON-AFRICAN AMERICAN: >60 ML/MIN
GFR SERPL CREATININE-BSD FRML MDRD: ABNORMAL ML/MIN/{1.73_M2}
GFR SERPL CREATININE-BSD FRML MDRD: ABNORMAL ML/MIN/{1.73_M2}
GLUCOSE BLD-MCNC: 132 MG/DL (ref 70–99)
HCT VFR BLD CALC: 40.5 % (ref 36–46)
HDLC SERPL-MCNC: 43 MG/DL
HEMOGLOBIN: 13.5 G/DL (ref 12–16)
IMMATURE GRANULOCYTES: NORMAL %
LDL CHOLESTEROL: 150 MG/DL (ref 0–130)
LYMPHOCYTES # BLD: 27 % (ref 24–44)
MAGNESIUM: 1.6 MG/DL (ref 1.6–2.6)
MCH RBC QN AUTO: 29.2 PG (ref 26–34)
MCHC RBC AUTO-ENTMCNC: 33.4 G/DL (ref 31–37)
MCV RBC AUTO: 87.5 FL (ref 80–100)
MONOCYTES # BLD: 6 % (ref 1–7)
NRBC AUTOMATED: NORMAL PER 100 WBC
PDW BLD-RTO: 13.1 % (ref 11.5–14.9)
PHOSPHORUS: 2.6 MG/DL (ref 2.6–4.5)
PLATELET # BLD: 186 K/UL (ref 150–450)
PLATELET ESTIMATE: NORMAL
PMV BLD AUTO: 8.4 FL (ref 6–12)
POTASSIUM SERPL-SCNC: 5.1 MMOL/L (ref 3.7–5.3)
RBC # BLD: 4.63 M/UL (ref 4–5.2)
RBC # BLD: NORMAL 10*6/UL
SEG NEUTROPHILS: 63 % (ref 36–66)
SEGMENTED NEUTROPHILS ABSOLUTE COUNT: 2.9 K/UL (ref 1.3–9.1)
SODIUM BLD-SCNC: 138 MMOL/L (ref 135–144)
TOTAL PROTEIN: 6.9 G/DL (ref 6.4–8.3)
TRIGLYCERIDE, FASTING: 161 MG/DL
URIC ACID: 7.7 MG/DL (ref 2.4–5.7)
VLDLC SERPL CALC-MCNC: ABNORMAL MG/DL (ref 1–30)
WBC # BLD: 4.6 K/UL (ref 3.5–11)
WBC # BLD: NORMAL 10*3/UL

## 2019-03-23 PROCEDURE — 80197 ASSAY OF TACROLIMUS: CPT

## 2019-03-23 PROCEDURE — 84100 ASSAY OF PHOSPHORUS: CPT

## 2019-03-23 PROCEDURE — 84550 ASSAY OF BLOOD/URIC ACID: CPT

## 2019-03-23 PROCEDURE — 80053 COMPREHEN METABOLIC PANEL: CPT

## 2019-03-23 PROCEDURE — 82248 BILIRUBIN DIRECT: CPT

## 2019-03-23 PROCEDURE — 85025 COMPLETE CBC W/AUTO DIFF WBC: CPT

## 2019-03-23 PROCEDURE — 80061 LIPID PANEL: CPT

## 2019-03-23 PROCEDURE — 36415 COLL VENOUS BLD VENIPUNCTURE: CPT

## 2019-03-23 PROCEDURE — 83735 ASSAY OF MAGNESIUM: CPT

## 2019-03-26 LAB — PROGRAF: 5.6 NG/ML

## 2019-04-27 ENCOUNTER — HOSPITAL ENCOUNTER (OUTPATIENT)
Age: 49
Discharge: HOME | End: 2019-04-27
Payer: COMMERCIAL

## 2019-04-27 LAB
ABSOLUTE EOS #: 0.2 K/UL (ref 0–0.4)
ABSOLUTE IMMATURE GRANULOCYTE: NORMAL K/UL (ref 0–0.3)
ABSOLUTE LYMPH #: 1.7 K/UL (ref 1–4.8)
ABSOLUTE MONO #: 0.4 K/UL (ref 0.1–1.3)
ALBUMIN SERPL-MCNC: 3.9 G/DL (ref 3.5–5.2)
ALBUMIN/GLOBULIN RATIO: ABNORMAL (ref 1–2.5)
ALP BLD-CCNC: 69 U/L (ref 35–104)
ALT SERPL-CCNC: 14 U/L (ref 5–33)
ANION GAP SERPL CALCULATED.3IONS-SCNC: 11 MMOL/L (ref 9–17)
AST SERPL-CCNC: 13 U/L
BASOPHILS # BLD: 1 % (ref 0–2)
BASOPHILS ABSOLUTE: 0 K/UL (ref 0–0.2)
BILIRUB SERPL-MCNC: 0.45 MG/DL (ref 0.3–1.2)
BUN BLDV-MCNC: 16 MG/DL (ref 6–20)
BUN/CREAT BLD: ABNORMAL (ref 9–20)
CALCIUM SERPL-MCNC: 9.5 MG/DL (ref 8.6–10.4)
CHLORIDE BLD-SCNC: 103 MMOL/L (ref 98–107)
CHOLESTEROL/HDL RATIO: 4.6
CHOLESTEROL: 215 MG/DL
CO2: 24 MMOL/L (ref 20–31)
CREAT SERPL-MCNC: 1.02 MG/DL (ref 0.5–0.9)
DIFFERENTIAL TYPE: NORMAL
EOSINOPHILS RELATIVE PERCENT: 3 % (ref 0–4)
GFR AFRICAN AMERICAN: >60 ML/MIN
GFR NON-AFRICAN AMERICAN: 57 ML/MIN
GFR SERPL CREATININE-BSD FRML MDRD: ABNORMAL ML/MIN/{1.73_M2}
GFR SERPL CREATININE-BSD FRML MDRD: ABNORMAL ML/MIN/{1.73_M2}
GLUCOSE BLD-MCNC: 123 MG/DL (ref 70–99)
HCT VFR BLD CALC: 40.9 % (ref 36–46)
HDLC SERPL-MCNC: 47 MG/DL
HEMOGLOBIN: 13.6 G/DL (ref 12–16)
IMMATURE GRANULOCYTES: NORMAL %
LDL CHOLESTEROL: 136 MG/DL (ref 0–130)
LYMPHOCYTES # BLD: 30 % (ref 24–44)
MAGNESIUM: 1.5 MG/DL (ref 1.6–2.6)
MCH RBC QN AUTO: 29.5 PG (ref 26–34)
MCHC RBC AUTO-ENTMCNC: 33.3 G/DL (ref 31–37)
MCV RBC AUTO: 88.6 FL (ref 80–100)
MONOCYTES # BLD: 6 % (ref 1–7)
NRBC AUTOMATED: NORMAL PER 100 WBC
PDW BLD-RTO: 13.2 % (ref 11.5–14.9)
PHOSPHORUS: 2.7 MG/DL (ref 2.6–4.5)
PLATELET # BLD: 178 K/UL (ref 150–450)
PLATELET ESTIMATE: NORMAL
PMV BLD AUTO: 7.9 FL (ref 6–12)
POTASSIUM SERPL-SCNC: 5 MMOL/L (ref 3.7–5.3)
RBC # BLD: 4.61 M/UL (ref 4–5.2)
RBC # BLD: NORMAL 10*6/UL
SEG NEUTROPHILS: 60 % (ref 36–66)
SEGMENTED NEUTROPHILS ABSOLUTE COUNT: 3.5 K/UL (ref 1.3–9.1)
SODIUM BLD-SCNC: 138 MMOL/L (ref 135–144)
TOTAL PROTEIN: 7.2 G/DL (ref 6.4–8.3)
TRIGL SERPL-MCNC: 162 MG/DL
URIC ACID: 7.5 MG/DL (ref 2.4–5.7)
VLDLC SERPL CALC-MCNC: ABNORMAL MG/DL (ref 1–30)
WBC # BLD: 5.8 K/UL (ref 3.5–11)
WBC # BLD: NORMAL 10*3/UL

## 2019-04-27 PROCEDURE — 83735 ASSAY OF MAGNESIUM: CPT

## 2019-04-27 PROCEDURE — 84550 ASSAY OF BLOOD/URIC ACID: CPT

## 2019-04-27 PROCEDURE — 85025 COMPLETE CBC W/AUTO DIFF WBC: CPT

## 2019-04-27 PROCEDURE — 87799 DETECT AGENT NOS DNA QUANT: CPT

## 2019-04-27 PROCEDURE — 80197 ASSAY OF TACROLIMUS: CPT

## 2019-04-27 PROCEDURE — 80053 COMPREHEN METABOLIC PANEL: CPT

## 2019-04-27 PROCEDURE — 36415 COLL VENOUS BLD VENIPUNCTURE: CPT

## 2019-04-27 PROCEDURE — 83036 HEMOGLOBIN GLYCOSYLATED A1C: CPT

## 2019-04-27 PROCEDURE — 80061 LIPID PANEL: CPT

## 2019-04-27 PROCEDURE — 84100 ASSAY OF PHOSPHORUS: CPT

## 2019-04-28 LAB
ESTIMATED AVERAGE GLUCOSE: 114 MG/DL
HBA1C MFR BLD: 5.6 % (ref 4–6)
PROGRAF: 4.9 NG/ML

## 2019-05-11 ENCOUNTER — HOSPITAL ENCOUNTER (OUTPATIENT)
Age: 49
Discharge: HOME | End: 2019-05-11
Payer: COMMERCIAL

## 2019-05-11 LAB
ABSOLUTE EOS #: 0.2 K/UL (ref 0–0.4)
ABSOLUTE IMMATURE GRANULOCYTE: NORMAL K/UL (ref 0–0.3)
ABSOLUTE LYMPH #: 1.6 K/UL (ref 1–4.8)
ABSOLUTE MONO #: 0.3 K/UL (ref 0.1–1.3)
ALBUMIN SERPL-MCNC: 4 G/DL (ref 3.5–5.2)
ALBUMIN/GLOBULIN RATIO: ABNORMAL (ref 1–2.5)
ALP BLD-CCNC: 76 U/L (ref 35–104)
ALT SERPL-CCNC: 13 U/L (ref 5–33)
ANION GAP SERPL CALCULATED.3IONS-SCNC: 11 MMOL/L (ref 9–17)
AST SERPL-CCNC: 14 U/L
BASOPHILS # BLD: 1 % (ref 0–2)
BASOPHILS ABSOLUTE: 0 K/UL (ref 0–0.2)
BILIRUB SERPL-MCNC: 0.45 MG/DL (ref 0.3–1.2)
BILIRUBIN DIRECT: 0.09 MG/DL
BUN BLDV-MCNC: 15 MG/DL (ref 6–20)
BUN/CREAT BLD: ABNORMAL (ref 9–20)
CALCIUM SERPL-MCNC: 9.1 MG/DL (ref 8.6–10.4)
CHLORIDE BLD-SCNC: 104 MMOL/L (ref 98–107)
CHOLESTEROL/HDL RATIO: 5.6
CHOLESTEROL: 231 MG/DL
CO2: 23 MMOL/L (ref 20–31)
CREAT SERPL-MCNC: 0.98 MG/DL (ref 0.5–0.9)
DIFFERENTIAL TYPE: NORMAL
EOSINOPHILS RELATIVE PERCENT: 3 % (ref 0–4)
GFR AFRICAN AMERICAN: >60 ML/MIN
GFR NON-AFRICAN AMERICAN: >60 ML/MIN
GFR SERPL CREATININE-BSD FRML MDRD: ABNORMAL ML/MIN/{1.73_M2}
GFR SERPL CREATININE-BSD FRML MDRD: ABNORMAL ML/MIN/{1.73_M2}
GLUCOSE BLD-MCNC: 119 MG/DL (ref 70–99)
HCT VFR BLD CALC: 41.4 % (ref 36–46)
HDLC SERPL-MCNC: 41 MG/DL
HEMOGLOBIN: 13.6 G/DL (ref 12–16)
IMMATURE GRANULOCYTES: NORMAL %
LDL CHOLESTEROL: 156 MG/DL (ref 0–130)
LYMPHOCYTES # BLD: 31 % (ref 24–44)
MAGNESIUM: 1.6 MG/DL (ref 1.6–2.6)
MCH RBC QN AUTO: 29.6 PG (ref 26–34)
MCHC RBC AUTO-ENTMCNC: 33 G/DL (ref 31–37)
MCV RBC AUTO: 89.8 FL (ref 80–100)
MONOCYTES # BLD: 6 % (ref 1–7)
NRBC AUTOMATED: NORMAL PER 100 WBC
PDW BLD-RTO: 13.5 % (ref 11.5–14.9)
PHOSPHORUS: 2.8 MG/DL (ref 2.6–4.5)
PLATELET # BLD: 196 K/UL (ref 150–450)
PLATELET ESTIMATE: NORMAL
PMV BLD AUTO: 8 FL (ref 6–12)
POTASSIUM SERPL-SCNC: 4.6 MMOL/L (ref 3.7–5.3)
RBC # BLD: 4.61 M/UL (ref 4–5.2)
RBC # BLD: NORMAL 10*6/UL
SEG NEUTROPHILS: 59 % (ref 36–66)
SEGMENTED NEUTROPHILS ABSOLUTE COUNT: 3.1 K/UL (ref 1.3–9.1)
SODIUM BLD-SCNC: 138 MMOL/L (ref 135–144)
TOTAL PROTEIN: 7.4 G/DL (ref 6.4–8.3)
TRIGL SERPL-MCNC: 171 MG/DL
URIC ACID: 7.1 MG/DL (ref 2.4–5.7)
VLDLC SERPL CALC-MCNC: ABNORMAL MG/DL (ref 1–30)
WBC # BLD: 5.3 K/UL (ref 3.5–11)
WBC # BLD: NORMAL 10*3/UL

## 2019-05-11 PROCEDURE — 82248 BILIRUBIN DIRECT: CPT

## 2019-05-11 PROCEDURE — 80061 LIPID PANEL: CPT

## 2019-05-11 PROCEDURE — 80197 ASSAY OF TACROLIMUS: CPT

## 2019-05-11 PROCEDURE — 84550 ASSAY OF BLOOD/URIC ACID: CPT

## 2019-05-11 PROCEDURE — 84100 ASSAY OF PHOSPHORUS: CPT

## 2019-05-11 PROCEDURE — 85025 COMPLETE CBC W/AUTO DIFF WBC: CPT

## 2019-05-11 PROCEDURE — 80053 COMPREHEN METABOLIC PANEL: CPT

## 2019-05-11 PROCEDURE — 36415 COLL VENOUS BLD VENIPUNCTURE: CPT

## 2019-05-11 PROCEDURE — 83735 ASSAY OF MAGNESIUM: CPT

## 2019-05-17 LAB — PROGRAF: 7.3

## 2019-07-20 ENCOUNTER — HOSPITAL ENCOUNTER (OUTPATIENT)
Age: 49
Discharge: HOME | End: 2019-07-20
Payer: COMMERCIAL

## 2019-07-20 LAB
ABSOLUTE EOS #: 0.3 K/UL (ref 0–0.4)
ABSOLUTE IMMATURE GRANULOCYTE: ABNORMAL K/UL (ref 0–0.3)
ABSOLUTE LYMPH #: 1.5 K/UL (ref 1–4.8)
ABSOLUTE MONO #: 0.4 K/UL (ref 0.1–1.3)
ALBUMIN SERPL-MCNC: 4 G/DL (ref 3.5–5.2)
ALBUMIN/GLOBULIN RATIO: ABNORMAL (ref 1–2.5)
ALP BLD-CCNC: 65 U/L (ref 35–104)
ALT SERPL-CCNC: 15 U/L (ref 5–33)
ANION GAP SERPL CALCULATED.3IONS-SCNC: 12 MMOL/L (ref 9–17)
AST SERPL-CCNC: 14 U/L
BASOPHILS # BLD: 1 % (ref 0–2)
BASOPHILS ABSOLUTE: 0 K/UL (ref 0–0.2)
BILIRUB SERPL-MCNC: 0.46 MG/DL (ref 0.3–1.2)
BUN BLDV-MCNC: 18 MG/DL (ref 6–20)
BUN/CREAT BLD: ABNORMAL (ref 9–20)
CALCIUM SERPL-MCNC: 9 MG/DL (ref 8.6–10.4)
CHLORIDE BLD-SCNC: 104 MMOL/L (ref 98–107)
CHOLESTEROL/HDL RATIO: 5.2
CHOLESTEROL: 212 MG/DL
CO2: 21 MMOL/L (ref 20–31)
CREAT SERPL-MCNC: 0.98 MG/DL (ref 0.5–0.9)
DIFFERENTIAL TYPE: ABNORMAL
EOSINOPHILS RELATIVE PERCENT: 5 % (ref 0–4)
GFR AFRICAN AMERICAN: >60 ML/MIN
GFR NON-AFRICAN AMERICAN: >60 ML/MIN
GFR SERPL CREATININE-BSD FRML MDRD: ABNORMAL ML/MIN/{1.73_M2}
GFR SERPL CREATININE-BSD FRML MDRD: ABNORMAL ML/MIN/{1.73_M2}
GLUCOSE BLD-MCNC: 122 MG/DL (ref 70–99)
HCT VFR BLD CALC: 40.2 % (ref 36–46)
HDLC SERPL-MCNC: 41 MG/DL
HEMOGLOBIN: 13.5 G/DL (ref 12–16)
IMMATURE GRANULOCYTES: ABNORMAL %
LDL CHOLESTEROL: 133 MG/DL (ref 0–130)
LYMPHOCYTES # BLD: 28 % (ref 24–44)
MAGNESIUM: 1.6 MG/DL (ref 1.6–2.6)
MCH RBC QN AUTO: 29.6 PG (ref 26–34)
MCHC RBC AUTO-ENTMCNC: 33.7 G/DL (ref 31–37)
MCV RBC AUTO: 87.9 FL (ref 80–100)
MONOCYTES # BLD: 7 % (ref 1–7)
NRBC AUTOMATED: ABNORMAL PER 100 WBC
PDW BLD-RTO: 13.1 % (ref 11.5–14.9)
PHOSPHORUS: 2.5 MG/DL (ref 2.6–4.5)
PLATELET # BLD: 164 K/UL (ref 150–450)
PLATELET ESTIMATE: ABNORMAL
PMV BLD AUTO: 8.5 FL (ref 6–12)
POTASSIUM SERPL-SCNC: 4.1 MMOL/L (ref 3.7–5.3)
RBC # BLD: 4.57 M/UL (ref 4–5.2)
RBC # BLD: ABNORMAL 10*6/UL
SEG NEUTROPHILS: 59 % (ref 36–66)
SEGMENTED NEUTROPHILS ABSOLUTE COUNT: 3.1 K/UL (ref 1.3–9.1)
SODIUM BLD-SCNC: 137 MMOL/L (ref 135–144)
TOTAL PROTEIN: 7.2 G/DL (ref 6.4–8.3)
TRIGL SERPL-MCNC: 192 MG/DL
URIC ACID: 8.8 MG/DL (ref 2.4–5.7)
VLDLC SERPL CALC-MCNC: ABNORMAL MG/DL (ref 1–30)
WBC # BLD: 5.2 K/UL (ref 3.5–11)
WBC # BLD: ABNORMAL 10*3/UL

## 2019-07-20 PROCEDURE — 83036 HEMOGLOBIN GLYCOSYLATED A1C: CPT

## 2019-07-20 PROCEDURE — 83735 ASSAY OF MAGNESIUM: CPT

## 2019-07-20 PROCEDURE — 87799 DETECT AGENT NOS DNA QUANT: CPT

## 2019-07-20 PROCEDURE — 80053 COMPREHEN METABOLIC PANEL: CPT

## 2019-07-20 PROCEDURE — 84100 ASSAY OF PHOSPHORUS: CPT

## 2019-07-20 PROCEDURE — 85025 COMPLETE CBC W/AUTO DIFF WBC: CPT

## 2019-07-20 PROCEDURE — 84550 ASSAY OF BLOOD/URIC ACID: CPT

## 2019-07-20 PROCEDURE — 80197 ASSAY OF TACROLIMUS: CPT

## 2019-07-20 PROCEDURE — 80061 LIPID PANEL: CPT

## 2019-07-20 PROCEDURE — 36415 COLL VENOUS BLD VENIPUNCTURE: CPT

## 2019-07-21 LAB
ESTIMATED AVERAGE GLUCOSE: 117 MG/DL
HBA1C MFR BLD: 5.7 % (ref 4–6)
PROGRAF: 5 NG/ML

## 2019-09-07 ENCOUNTER — HOSPITAL ENCOUNTER (OUTPATIENT)
Age: 49
Discharge: HOME | End: 2019-09-07
Payer: COMMERCIAL

## 2019-09-07 LAB
ABSOLUTE EOS #: 0.3 K/UL (ref 0–0.4)
ABSOLUTE IMMATURE GRANULOCYTE: ABNORMAL K/UL (ref 0–0.3)
ABSOLUTE LYMPH #: 1.5 K/UL (ref 1–4.8)
ABSOLUTE MONO #: 0.3 K/UL (ref 0.1–1.3)
ALBUMIN SERPL-MCNC: 4 G/DL (ref 3.5–5.2)
ALBUMIN/GLOBULIN RATIO: ABNORMAL (ref 1–2.5)
ALP BLD-CCNC: 70 U/L (ref 35–104)
ALT SERPL-CCNC: 17 U/L (ref 5–33)
ANION GAP SERPL CALCULATED.3IONS-SCNC: 15 MMOL/L (ref 9–17)
AST SERPL-CCNC: 15 U/L
BASOPHILS # BLD: 1 % (ref 0–2)
BASOPHILS ABSOLUTE: 0 K/UL (ref 0–0.2)
BILIRUB SERPL-MCNC: 0.44 MG/DL (ref 0.3–1.2)
BUN BLDV-MCNC: 17 MG/DL (ref 6–20)
BUN/CREAT BLD: ABNORMAL (ref 9–20)
CALCIUM SERPL-MCNC: 9.7 MG/DL (ref 8.6–10.4)
CHLORIDE BLD-SCNC: 104 MMOL/L (ref 98–107)
CHOLESTEROL/HDL RATIO: 5.4
CHOLESTEROL: 232 MG/DL
CO2: 21 MMOL/L (ref 20–31)
CREAT SERPL-MCNC: 1.11 MG/DL (ref 0.5–0.9)
DIFFERENTIAL TYPE: ABNORMAL
EOSINOPHILS RELATIVE PERCENT: 5 % (ref 0–4)
GFR AFRICAN AMERICAN: >60 ML/MIN
GFR NON-AFRICAN AMERICAN: 52 ML/MIN
GFR SERPL CREATININE-BSD FRML MDRD: ABNORMAL ML/MIN/{1.73_M2}
GFR SERPL CREATININE-BSD FRML MDRD: ABNORMAL ML/MIN/{1.73_M2}
GLUCOSE BLD-MCNC: 113 MG/DL (ref 70–99)
HCT VFR BLD CALC: 41.1 % (ref 36–46)
HDLC SERPL-MCNC: 43 MG/DL
HEMOGLOBIN: 13.5 G/DL (ref 12–16)
IMMATURE GRANULOCYTES: ABNORMAL %
LDL CHOLESTEROL: 156 MG/DL (ref 0–130)
LYMPHOCYTES # BLD: 28 % (ref 24–44)
MAGNESIUM: 1.6 MG/DL (ref 1.6–2.6)
MCH RBC QN AUTO: 29.4 PG (ref 26–34)
MCHC RBC AUTO-ENTMCNC: 32.8 G/DL (ref 31–37)
MCV RBC AUTO: 89.5 FL (ref 80–100)
MONOCYTES # BLD: 6 % (ref 1–7)
NRBC AUTOMATED: ABNORMAL PER 100 WBC
PDW BLD-RTO: 13.8 % (ref 11.5–14.9)
PHOSPHORUS: 2.7 MG/DL (ref 2.6–4.5)
PLATELET # BLD: 175 K/UL (ref 150–450)
PLATELET ESTIMATE: ABNORMAL
PMV BLD AUTO: 8.4 FL (ref 6–12)
POTASSIUM SERPL-SCNC: 4.5 MMOL/L (ref 3.7–5.3)
RBC # BLD: 4.59 M/UL (ref 4–5.2)
RBC # BLD: ABNORMAL 10*6/UL
SEG NEUTROPHILS: 60 % (ref 36–66)
SEGMENTED NEUTROPHILS ABSOLUTE COUNT: 3.3 K/UL (ref 1.3–9.1)
SODIUM BLD-SCNC: 140 MMOL/L (ref 135–144)
TOTAL PROTEIN: 7.2 G/DL (ref 6.4–8.3)
TRIGL SERPL-MCNC: 163 MG/DL
URIC ACID: 7.3 MG/DL (ref 2.4–5.7)
VLDLC SERPL CALC-MCNC: ABNORMAL MG/DL (ref 1–30)
WBC # BLD: 5.4 K/UL (ref 3.5–11)
WBC # BLD: ABNORMAL 10*3/UL

## 2019-09-07 PROCEDURE — 85025 COMPLETE CBC W/AUTO DIFF WBC: CPT

## 2019-09-07 PROCEDURE — 83735 ASSAY OF MAGNESIUM: CPT

## 2019-09-07 PROCEDURE — 80053 COMPREHEN METABOLIC PANEL: CPT

## 2019-09-07 PROCEDURE — 84100 ASSAY OF PHOSPHORUS: CPT

## 2019-09-07 PROCEDURE — 80061 LIPID PANEL: CPT

## 2019-09-07 PROCEDURE — 84550 ASSAY OF BLOOD/URIC ACID: CPT

## 2019-09-07 PROCEDURE — 36415 COLL VENOUS BLD VENIPUNCTURE: CPT

## 2019-09-07 PROCEDURE — 80197 ASSAY OF TACROLIMUS: CPT

## 2019-09-09 LAB — PROGRAF: 5.2 NG/ML

## 2019-09-20 ENCOUNTER — OFFICE VISIT (OUTPATIENT)
Dept: PRIMARY CARE CLINIC | Age: 49
End: 2019-09-20
Payer: COMMERCIAL

## 2019-09-20 VITALS
OXYGEN SATURATION: 98 % | WEIGHT: 275.6 LBS | SYSTOLIC BLOOD PRESSURE: 124 MMHG | HEART RATE: 93 BPM | BODY MASS INDEX: 41.77 KG/M2 | HEIGHT: 68 IN | DIASTOLIC BLOOD PRESSURE: 86 MMHG

## 2019-09-20 DIAGNOSIS — Z00.00 HEALTHCARE MAINTENANCE: Primary | ICD-10-CM

## 2019-09-20 DIAGNOSIS — Z12.11 SCREENING FOR COLON CANCER: ICD-10-CM

## 2019-09-20 DIAGNOSIS — Z12.39 ENCOUNTER FOR SCREENING FOR MALIGNANT NEOPLASM OF BREAST: ICD-10-CM

## 2019-09-20 PROCEDURE — 99396 PREV VISIT EST AGE 40-64: CPT | Performed by: FAMILY MEDICINE

## 2019-09-20 RX ORDER — TACROLIMUS 0.5 MG/1
0.5 CAPSULE ORAL 2 TIMES DAILY
Qty: 60 CAPSULE | Refills: 0
Start: 2019-09-20

## 2019-09-20 RX ORDER — TACROLIMUS 1 MG/1
CAPSULE ORAL
Qty: 60 CAPSULE | Refills: 0
Start: 2019-09-20

## 2019-09-20 SDOH — HEALTH STABILITY: MENTAL HEALTH: HOW OFTEN DO YOU HAVE A DRINK CONTAINING ALCOHOL?: 2-4 TIMES A MONTH

## 2019-09-20 ASSESSMENT — ENCOUNTER SYMPTOMS
EYE REDNESS: 0
RHINORRHEA: 0
EYE DISCHARGE: 0
WHEEZING: 0
DIARRHEA: 0
SORE THROAT: 0
COUGH: 0
NAUSEA: 0
SHORTNESS OF BREATH: 0
ABDOMINAL PAIN: 0
VOMITING: 0

## 2019-10-16 ENCOUNTER — TELEPHONE (OUTPATIENT)
Dept: PRIMARY CARE CLINIC | Age: 49
End: 2019-10-16

## 2019-10-20 PROBLEM — Z00.00 HEALTHCARE MAINTENANCE: Status: RESOLVED | Noted: 2019-09-20 | Resolved: 2019-10-20

## 2019-10-26 ENCOUNTER — HOSPITAL ENCOUNTER (OUTPATIENT)
Age: 49
Discharge: HOME | End: 2019-10-26
Payer: COMMERCIAL

## 2019-10-26 LAB
ABSOLUTE EOS #: 0.2 K/UL (ref 0–0.4)
ABSOLUTE IMMATURE GRANULOCYTE: ABNORMAL K/UL (ref 0–0.3)
ABSOLUTE LYMPH #: 1.5 K/UL (ref 1–4.8)
ABSOLUTE MONO #: 0.5 K/UL (ref 0.1–1.3)
ALBUMIN SERPL-MCNC: 4.2 G/DL (ref 3.5–5.2)
ALBUMIN/GLOBULIN RATIO: ABNORMAL (ref 1–2.5)
ALP BLD-CCNC: 66 U/L (ref 35–104)
ALT SERPL-CCNC: 20 U/L (ref 5–33)
ANION GAP SERPL CALCULATED.3IONS-SCNC: 13 MMOL/L (ref 9–17)
AST SERPL-CCNC: 15 U/L
BASOPHILS # BLD: 1 % (ref 0–2)
BASOPHILS ABSOLUTE: 0 K/UL (ref 0–0.2)
BILIRUB SERPL-MCNC: 0.63 MG/DL (ref 0.3–1.2)
BILIRUBIN DIRECT: 0.13 MG/DL
BILIRUBIN, INDIRECT: 0.5 MG/DL (ref 0–1)
BUN BLDV-MCNC: 13 MG/DL (ref 6–20)
CALCIUM SERPL-MCNC: 9.5 MG/DL (ref 8.6–10.4)
CHLORIDE BLD-SCNC: 104 MMOL/L (ref 98–107)
CHOLESTEROL/HDL RATIO: 5
CHOLESTEROL: 238 MG/DL
CO2: 22 MMOL/L (ref 20–31)
CREAT SERPL-MCNC: 0.99 MG/DL (ref 0.5–0.9)
DIFFERENTIAL TYPE: ABNORMAL
EOSINOPHILS RELATIVE PERCENT: 3 % (ref 0–4)
GFR AFRICAN AMERICAN: >60 ML/MIN
GFR NON-AFRICAN AMERICAN: 59 ML/MIN
GFR SERPL CREATININE-BSD FRML MDRD: ABNORMAL ML/MIN/{1.73_M2}
GFR SERPL CREATININE-BSD FRML MDRD: ABNORMAL ML/MIN/{1.73_M2}
GLUCOSE BLD-MCNC: 103 MG/DL (ref 70–99)
HCT VFR BLD CALC: 40.3 % (ref 36–46)
HDLC SERPL-MCNC: 48 MG/DL
HEMOGLOBIN: 13.6 G/DL (ref 12–16)
IMMATURE GRANULOCYTES: ABNORMAL %
LDL CHOLESTEROL: 151 MG/DL (ref 0–130)
LYMPHOCYTES # BLD: 26 % (ref 24–44)
MAGNESIUM: 1.6 MG/DL (ref 1.6–2.6)
MCH RBC QN AUTO: 30 PG (ref 26–34)
MCHC RBC AUTO-ENTMCNC: 33.9 G/DL (ref 31–37)
MCV RBC AUTO: 88.6 FL (ref 80–100)
MONOCYTES # BLD: 8 % (ref 1–7)
NRBC AUTOMATED: ABNORMAL PER 100 WBC
PDW BLD-RTO: 13.8 % (ref 11.5–14.9)
PHOSPHORUS: 2.9 MG/DL (ref 2.6–4.5)
PLATELET # BLD: 183 K/UL (ref 150–450)
PLATELET ESTIMATE: ABNORMAL
PMV BLD AUTO: 7.7 FL (ref 6–12)
POTASSIUM SERPL-SCNC: 4.6 MMOL/L (ref 3.7–5.3)
RBC # BLD: 4.54 M/UL (ref 4–5.2)
RBC # BLD: ABNORMAL 10*6/UL
SEG NEUTROPHILS: 62 % (ref 36–66)
SEGMENTED NEUTROPHILS ABSOLUTE COUNT: 3.7 K/UL (ref 1.3–9.1)
SODIUM BLD-SCNC: 139 MMOL/L (ref 135–144)
TOTAL PROTEIN: 7.5 G/DL (ref 6.4–8.3)
TRIGL SERPL-MCNC: 196 MG/DL
URIC ACID: 7.9 MG/DL (ref 2.4–5.7)
VLDLC SERPL CALC-MCNC: ABNORMAL MG/DL (ref 1–30)
WBC # BLD: 5.9 K/UL (ref 3.5–11)
WBC # BLD: ABNORMAL 10*3/UL

## 2019-10-26 PROCEDURE — 80197 ASSAY OF TACROLIMUS: CPT

## 2019-10-26 PROCEDURE — 87799 DETECT AGENT NOS DNA QUANT: CPT

## 2019-10-26 PROCEDURE — 83036 HEMOGLOBIN GLYCOSYLATED A1C: CPT

## 2019-10-26 PROCEDURE — 82248 BILIRUBIN DIRECT: CPT

## 2019-10-26 PROCEDURE — 84550 ASSAY OF BLOOD/URIC ACID: CPT

## 2019-10-26 PROCEDURE — 85025 COMPLETE CBC W/AUTO DIFF WBC: CPT

## 2019-10-26 PROCEDURE — 80061 LIPID PANEL: CPT

## 2019-10-26 PROCEDURE — 36415 COLL VENOUS BLD VENIPUNCTURE: CPT

## 2019-10-26 PROCEDURE — 84100 ASSAY OF PHOSPHORUS: CPT

## 2019-10-26 PROCEDURE — 80053 COMPREHEN METABOLIC PANEL: CPT

## 2019-10-26 PROCEDURE — 83735 ASSAY OF MAGNESIUM: CPT

## 2019-10-27 LAB
ESTIMATED AVERAGE GLUCOSE: 114 MG/DL
HBA1C MFR BLD: 5.6 % (ref 4–6)
PROGRAF: 3.6 NG/ML

## 2020-03-10 ENCOUNTER — TELEPHONE (OUTPATIENT)
Dept: PRIMARY CARE CLINIC | Age: 50
End: 2020-03-10

## 2020-06-13 ENCOUNTER — HOSPITAL ENCOUNTER (OUTPATIENT)
Age: 50
Discharge: HOME | End: 2020-06-13
Payer: COMMERCIAL

## 2020-06-13 LAB
ABSOLUTE EOS #: 0.1 K/UL (ref 0–0.4)
ABSOLUTE IMMATURE GRANULOCYTE: NORMAL K/UL (ref 0–0.3)
ABSOLUTE LYMPH #: 1.4 K/UL (ref 1–4.8)
ABSOLUTE MONO #: 0.3 K/UL (ref 0.1–1.3)
ALBUMIN SERPL-MCNC: 4 G/DL (ref 3.5–5.2)
ALBUMIN/GLOBULIN RATIO: NORMAL (ref 1–2.5)
ALP BLD-CCNC: 64 U/L (ref 35–104)
ALT SERPL-CCNC: 14 U/L (ref 5–33)
ANION GAP SERPL CALCULATED.3IONS-SCNC: 11 MMOL/L (ref 9–17)
AST SERPL-CCNC: 15 U/L
BASOPHILS # BLD: 1 % (ref 0–2)
BASOPHILS ABSOLUTE: 0 K/UL (ref 0–0.2)
BILIRUB SERPL-MCNC: 0.48 MG/DL (ref 0.3–1.2)
BILIRUBIN DIRECT: 0.1 MG/DL
BUN BLDV-MCNC: 15 MG/DL (ref 6–20)
BUN/CREAT BLD: NORMAL (ref 9–20)
CALCIUM SERPL-MCNC: 9.6 MG/DL (ref 8.6–10.4)
CHLORIDE BLD-SCNC: 100 MMOL/L (ref 98–107)
CO2: 25 MMOL/L (ref 20–31)
CREAT SERPL-MCNC: 0.83 MG/DL (ref 0.5–0.9)
DIFFERENTIAL TYPE: NORMAL
EOSINOPHILS RELATIVE PERCENT: 3 % (ref 0–4)
GFR AFRICAN AMERICAN: >60 ML/MIN
GFR NON-AFRICAN AMERICAN: >60 ML/MIN
GFR SERPL CREATININE-BSD FRML MDRD: NORMAL ML/MIN/{1.73_M2}
GFR SERPL CREATININE-BSD FRML MDRD: NORMAL ML/MIN/{1.73_M2}
GLUCOSE BLD-MCNC: 96 MG/DL (ref 70–99)
HCT VFR BLD CALC: 42.2 % (ref 36–46)
HEMOGLOBIN: 13.9 G/DL (ref 12–16)
IMMATURE GRANULOCYTES: NORMAL %
LYMPHOCYTES # BLD: 26 % (ref 24–44)
MAGNESIUM: 1.5 MG/DL (ref 1.6–2.6)
MCH RBC QN AUTO: 29.3 PG (ref 26–34)
MCHC RBC AUTO-ENTMCNC: 32.9 G/DL (ref 31–37)
MCV RBC AUTO: 88.9 FL (ref 80–100)
MONOCYTES # BLD: 7 % (ref 1–7)
NRBC AUTOMATED: NORMAL PER 100 WBC
PDW BLD-RTO: 13.6 % (ref 11.5–14.9)
PHOSPHORUS: 3 MG/DL (ref 2.6–4.5)
PLATELET # BLD: 173 K/UL (ref 150–450)
PLATELET ESTIMATE: NORMAL
PMV BLD AUTO: 8.8 FL (ref 6–12)
POTASSIUM SERPL-SCNC: 4.4 MMOL/L (ref 3.7–5.3)
RBC # BLD: 4.74 M/UL (ref 4–5.2)
RBC # BLD: NORMAL 10*6/UL
SEG NEUTROPHILS: 63 % (ref 36–66)
SEGMENTED NEUTROPHILS ABSOLUTE COUNT: 3.2 K/UL (ref 1.3–9.1)
SODIUM BLD-SCNC: 136 MMOL/L (ref 135–144)
TOTAL PROTEIN: 7 G/DL (ref 6.4–8.3)
URIC ACID: 6.7 MG/DL (ref 2.4–5.7)
WBC # BLD: 5.1 K/UL (ref 3.5–11)
WBC # BLD: NORMAL 10*3/UL

## 2020-06-13 PROCEDURE — 87799 DETECT AGENT NOS DNA QUANT: CPT

## 2020-06-13 PROCEDURE — 82248 BILIRUBIN DIRECT: CPT

## 2020-06-13 PROCEDURE — 80053 COMPREHEN METABOLIC PANEL: CPT

## 2020-06-13 PROCEDURE — 84550 ASSAY OF BLOOD/URIC ACID: CPT

## 2020-06-13 PROCEDURE — 80197 ASSAY OF TACROLIMUS: CPT

## 2020-06-13 PROCEDURE — 85025 COMPLETE CBC W/AUTO DIFF WBC: CPT

## 2020-06-13 PROCEDURE — 84100 ASSAY OF PHOSPHORUS: CPT

## 2020-06-13 PROCEDURE — 83735 ASSAY OF MAGNESIUM: CPT

## 2020-06-13 PROCEDURE — 83036 HEMOGLOBIN GLYCOSYLATED A1C: CPT

## 2020-06-13 PROCEDURE — 36415 COLL VENOUS BLD VENIPUNCTURE: CPT

## 2020-06-14 LAB
BK QUANTITATION: NOT DETECTED COPIES/ML
BK VIRUS LOG: NOT DETECTED COPIES/ML
BK VIRUS SOURCE: NORMAL
ESTIMATED AVERAGE GLUCOSE: 105 MG/DL
HBA1C MFR BLD: 5.3 % (ref 4–6)
PROGRAF: 4.7 NG/ML (ref 5–20)

## 2020-08-19 ENCOUNTER — TELEPHONE (OUTPATIENT)
Dept: PRIMARY CARE CLINIC | Age: 50
End: 2020-08-19

## 2020-08-19 NOTE — TELEPHONE ENCOUNTER
Pt calling asking when was the last TDAP injection. She states that they have a new grandbaby and the pediatrician advised that everyone in the family have a TDAP.

## 2020-08-20 NOTE — TELEPHONE ENCOUNTER
OK to schedule for a TDAP? She states that she believes it has been about 10 years ago since she got a TDAP.  Please approve/deny

## 2020-10-17 ENCOUNTER — HOSPITAL ENCOUNTER (OUTPATIENT)
Age: 50
Discharge: HOME | End: 2020-10-17
Payer: COMMERCIAL

## 2020-10-17 LAB
ABSOLUTE EOS #: 0.2 K/UL (ref 0–0.4)
ABSOLUTE IMMATURE GRANULOCYTE: NORMAL K/UL (ref 0–0.3)
ABSOLUTE LYMPH #: 1.4 K/UL (ref 1–4.8)
ABSOLUTE MONO #: 0.4 K/UL (ref 0.1–1.3)
ALBUMIN SERPL-MCNC: 4.1 G/DL (ref 3.5–5.2)
ALBUMIN/GLOBULIN RATIO: ABNORMAL (ref 1–2.5)
ALP BLD-CCNC: 61 U/L (ref 35–104)
ALT SERPL-CCNC: 13 U/L (ref 5–33)
ANION GAP SERPL CALCULATED.3IONS-SCNC: 12 MMOL/L (ref 9–17)
AST SERPL-CCNC: 13 U/L
BASOPHILS # BLD: 1 % (ref 0–2)
BASOPHILS ABSOLUTE: 0 K/UL (ref 0–0.2)
BILIRUB SERPL-MCNC: 0.57 MG/DL (ref 0.3–1.2)
BILIRUBIN DIRECT: 0.11 MG/DL
BILIRUBIN, INDIRECT: 0.46 MG/DL (ref 0–1)
BUN BLDV-MCNC: 17 MG/DL (ref 6–20)
CALCIUM SERPL-MCNC: 9.3 MG/DL (ref 8.6–10.4)
CHLORIDE BLD-SCNC: 105 MMOL/L (ref 98–107)
CHOLESTEROL/HDL RATIO: 5.2
CHOLESTEROL: 217 MG/DL
CO2: 22 MMOL/L (ref 20–31)
CREAT SERPL-MCNC: 0.86 MG/DL (ref 0.5–0.9)
DIFFERENTIAL TYPE: NORMAL
EOSINOPHILS RELATIVE PERCENT: 4 % (ref 0–4)
GFR AFRICAN AMERICAN: >60 ML/MIN
GFR NON-AFRICAN AMERICAN: >60 ML/MIN
GFR SERPL CREATININE-BSD FRML MDRD: ABNORMAL ML/MIN/{1.73_M2}
GFR SERPL CREATININE-BSD FRML MDRD: ABNORMAL ML/MIN/{1.73_M2}
GLUCOSE BLD-MCNC: 107 MG/DL (ref 70–99)
HCT VFR BLD CALC: 39.7 % (ref 36–46)
HDLC SERPL-MCNC: 42 MG/DL
HEMOGLOBIN: 13.4 G/DL (ref 12–16)
IMMATURE GRANULOCYTES: NORMAL %
LDL CHOLESTEROL: 141 MG/DL (ref 0–130)
LYMPHOCYTES # BLD: 26 % (ref 24–44)
MAGNESIUM: 1.7 MG/DL (ref 1.6–2.6)
MCH RBC QN AUTO: 29.5 PG (ref 26–34)
MCHC RBC AUTO-ENTMCNC: 33.8 G/DL (ref 31–37)
MCV RBC AUTO: 87.1 FL (ref 80–100)
MONOCYTES # BLD: 7 % (ref 1–7)
NRBC AUTOMATED: NORMAL PER 100 WBC
PDW BLD-RTO: 13.5 % (ref 11.5–14.9)
PHOSPHORUS: 2.7 MG/DL (ref 2.6–4.5)
PLATELET # BLD: 200 K/UL (ref 150–450)
PLATELET ESTIMATE: NORMAL
PMV BLD AUTO: 7.8 FL (ref 6–12)
POTASSIUM SERPL-SCNC: 4.8 MMOL/L (ref 3.7–5.3)
RBC # BLD: 4.56 M/UL (ref 4–5.2)
RBC # BLD: NORMAL 10*6/UL
SEG NEUTROPHILS: 62 % (ref 36–66)
SEGMENTED NEUTROPHILS ABSOLUTE COUNT: 3.4 K/UL (ref 1.3–9.1)
SODIUM BLD-SCNC: 139 MMOL/L (ref 135–144)
TOTAL PROTEIN: 7.1 G/DL (ref 6.4–8.3)
TRIGL SERPL-MCNC: 168 MG/DL
URIC ACID: 7.4 MG/DL (ref 2.4–5.7)
VLDLC SERPL CALC-MCNC: ABNORMAL MG/DL (ref 1–30)
WBC # BLD: 5.4 K/UL (ref 3.5–11)
WBC # BLD: NORMAL 10*3/UL

## 2020-10-17 PROCEDURE — 84100 ASSAY OF PHOSPHORUS: CPT

## 2020-10-17 PROCEDURE — 80061 LIPID PANEL: CPT

## 2020-10-17 PROCEDURE — 80053 COMPREHEN METABOLIC PANEL: CPT

## 2020-10-17 PROCEDURE — 83036 HEMOGLOBIN GLYCOSYLATED A1C: CPT

## 2020-10-17 PROCEDURE — 83735 ASSAY OF MAGNESIUM: CPT

## 2020-10-17 PROCEDURE — 80197 ASSAY OF TACROLIMUS: CPT

## 2020-10-17 PROCEDURE — 87799 DETECT AGENT NOS DNA QUANT: CPT

## 2020-10-17 PROCEDURE — 36415 COLL VENOUS BLD VENIPUNCTURE: CPT

## 2020-10-17 PROCEDURE — 82248 BILIRUBIN DIRECT: CPT

## 2020-10-17 PROCEDURE — 84550 ASSAY OF BLOOD/URIC ACID: CPT

## 2020-10-17 PROCEDURE — 85025 COMPLETE CBC W/AUTO DIFF WBC: CPT

## 2020-10-18 LAB
ESTIMATED AVERAGE GLUCOSE: 120 MG/DL
HBA1C MFR BLD: 5.8 % (ref 4–6)
PROGRAF: 5.7 NG/ML

## 2021-02-13 ENCOUNTER — HOSPITAL ENCOUNTER (OUTPATIENT)
Age: 51
Discharge: HOME | End: 2021-02-13
Payer: COMMERCIAL

## 2021-02-13 LAB
ABSOLUTE EOS #: 0.3 K/UL (ref 0–0.4)
ABSOLUTE IMMATURE GRANULOCYTE: NORMAL K/UL (ref 0–0.3)
ABSOLUTE LYMPH #: 1.8 K/UL (ref 1–4.8)
ABSOLUTE MONO #: 0.5 K/UL (ref 0.1–1.3)
ALBUMIN SERPL-MCNC: 3.8 G/DL (ref 3.5–5.2)
ALBUMIN/GLOBULIN RATIO: ABNORMAL (ref 1–2.5)
ALP BLD-CCNC: 74 U/L (ref 35–104)
ALT SERPL-CCNC: 14 U/L (ref 5–33)
ANION GAP SERPL CALCULATED.3IONS-SCNC: 8 MMOL/L (ref 9–17)
AST SERPL-CCNC: 14 U/L
BASOPHILS # BLD: 1 % (ref 0–2)
BASOPHILS ABSOLUTE: 0 K/UL (ref 0–0.2)
BILIRUB SERPL-MCNC: 0.5 MG/DL (ref 0.3–1.2)
BILIRUBIN DIRECT: 0.1 MG/DL
BILIRUBIN, INDIRECT: 0.4 MG/DL (ref 0–1)
BUN BLDV-MCNC: 18 MG/DL (ref 6–20)
CALCIUM SERPL-MCNC: 9 MG/DL (ref 8.6–10.4)
CHLORIDE BLD-SCNC: 106 MMOL/L (ref 98–107)
CHOLESTEROL/HDL RATIO: 4.6
CHOLESTEROL: 206 MG/DL
CO2: 22 MMOL/L (ref 20–31)
CREAT SERPL-MCNC: 0.91 MG/DL (ref 0.5–0.9)
DIFFERENTIAL TYPE: NORMAL
EOSINOPHILS RELATIVE PERCENT: 4 % (ref 0–4)
GFR AFRICAN AMERICAN: >60 ML/MIN
GFR NON-AFRICAN AMERICAN: >60 ML/MIN
GFR SERPL CREATININE-BSD FRML MDRD: ABNORMAL ML/MIN/{1.73_M2}
GFR SERPL CREATININE-BSD FRML MDRD: ABNORMAL ML/MIN/{1.73_M2}
GLUCOSE BLD-MCNC: 109 MG/DL (ref 70–99)
HCT VFR BLD CALC: 40 % (ref 36–46)
HDLC SERPL-MCNC: 45 MG/DL
HEMOGLOBIN: 13.2 G/DL (ref 12–16)
IMMATURE GRANULOCYTES: NORMAL %
LDL CHOLESTEROL: 124 MG/DL (ref 0–130)
LYMPHOCYTES # BLD: 27 % (ref 24–44)
MAGNESIUM: 1.7 MG/DL (ref 1.6–2.6)
MCH RBC QN AUTO: 29.8 PG (ref 26–34)
MCHC RBC AUTO-ENTMCNC: 33 G/DL (ref 31–37)
MCV RBC AUTO: 90.4 FL (ref 80–100)
MONOCYTES # BLD: 7 % (ref 1–7)
NRBC AUTOMATED: NORMAL PER 100 WBC
PDW BLD-RTO: 13.6 % (ref 11.5–14.9)
PHOSPHORUS: 2.2 MG/DL (ref 2.6–4.5)
PLATELET # BLD: 158 K/UL (ref 150–450)
PLATELET ESTIMATE: NORMAL
PMV BLD AUTO: 8.3 FL (ref 6–12)
POTASSIUM SERPL-SCNC: 5 MMOL/L (ref 3.7–5.3)
RBC # BLD: 4.42 M/UL (ref 4–5.2)
RBC # BLD: NORMAL 10*6/UL
SEG NEUTROPHILS: 61 % (ref 36–66)
SEGMENTED NEUTROPHILS ABSOLUTE COUNT: 4.1 K/UL (ref 1.3–9.1)
SODIUM BLD-SCNC: 136 MMOL/L (ref 135–144)
TOTAL PROTEIN: 7.1 G/DL (ref 6.4–8.3)
TRIGL SERPL-MCNC: 186 MG/DL
URIC ACID: 7.4 MG/DL (ref 2.4–5.7)
VLDLC SERPL CALC-MCNC: ABNORMAL MG/DL (ref 1–30)
WBC # BLD: 6.7 K/UL (ref 3.5–11)
WBC # BLD: NORMAL 10*3/UL

## 2021-02-13 PROCEDURE — 80053 COMPREHEN METABOLIC PANEL: CPT

## 2021-02-13 PROCEDURE — 84100 ASSAY OF PHOSPHORUS: CPT

## 2021-02-13 PROCEDURE — 85025 COMPLETE CBC W/AUTO DIFF WBC: CPT

## 2021-02-13 PROCEDURE — 80197 ASSAY OF TACROLIMUS: CPT

## 2021-02-13 PROCEDURE — 83735 ASSAY OF MAGNESIUM: CPT

## 2021-02-13 PROCEDURE — 36415 COLL VENOUS BLD VENIPUNCTURE: CPT

## 2021-02-13 PROCEDURE — 82248 BILIRUBIN DIRECT: CPT

## 2021-02-13 PROCEDURE — 80061 LIPID PANEL: CPT

## 2021-02-13 PROCEDURE — 84550 ASSAY OF BLOOD/URIC ACID: CPT

## 2021-02-15 LAB — PROGRAF: 6.5 NG/ML

## 2021-03-20 ENCOUNTER — HOSPITAL ENCOUNTER (OUTPATIENT)
Age: 51
Discharge: HOME | End: 2021-03-20
Payer: COMMERCIAL

## 2021-03-20 LAB
ABSOLUTE EOS #: 0.2 K/UL (ref 0–0.4)
ABSOLUTE IMMATURE GRANULOCYTE: NORMAL K/UL (ref 0–0.3)
ABSOLUTE LYMPH #: 1.7 K/UL (ref 1–4.8)
ABSOLUTE MONO #: 0.4 K/UL (ref 0.1–1.3)
ALBUMIN SERPL-MCNC: 4.2 G/DL (ref 3.5–5.2)
ALBUMIN/GLOBULIN RATIO: ABNORMAL (ref 1–2.5)
ALP BLD-CCNC: 76 U/L (ref 35–104)
ALT SERPL-CCNC: 15 U/L (ref 5–33)
ANION GAP SERPL CALCULATED.3IONS-SCNC: 8 MMOL/L (ref 9–17)
AST SERPL-CCNC: 18 U/L
BASOPHILS # BLD: 1 % (ref 0–2)
BASOPHILS ABSOLUTE: 0 K/UL (ref 0–0.2)
BILIRUB SERPL-MCNC: 0.63 MG/DL (ref 0.3–1.2)
BUN BLDV-MCNC: 16 MG/DL (ref 6–20)
BUN/CREAT BLD: ABNORMAL (ref 9–20)
CALCIUM SERPL-MCNC: 9.5 MG/DL (ref 8.6–10.4)
CHLORIDE BLD-SCNC: 104 MMOL/L (ref 98–107)
CHOLESTEROL/HDL RATIO: 5.1
CHOLESTEROL: 224 MG/DL
CO2: 24 MMOL/L (ref 20–31)
CREAT SERPL-MCNC: 0.99 MG/DL (ref 0.5–0.9)
DIFFERENTIAL TYPE: NORMAL
EOSINOPHILS RELATIVE PERCENT: 3 % (ref 0–4)
GFR AFRICAN AMERICAN: >60 ML/MIN
GFR NON-AFRICAN AMERICAN: 59 ML/MIN
GFR SERPL CREATININE-BSD FRML MDRD: ABNORMAL ML/MIN/{1.73_M2}
GFR SERPL CREATININE-BSD FRML MDRD: ABNORMAL ML/MIN/{1.73_M2}
GLUCOSE FASTING: 108 MG/DL (ref 70–99)
HCT VFR BLD CALC: 39.4 % (ref 36–46)
HDLC SERPL-MCNC: 44 MG/DL
HEMOGLOBIN: 13.2 G/DL (ref 12–16)
IMMATURE GRANULOCYTES: NORMAL %
LDL CHOLESTEROL: 143 MG/DL (ref 0–130)
LYMPHOCYTES # BLD: 28 % (ref 24–44)
MAGNESIUM: 1.7 MG/DL (ref 1.6–2.6)
MCH RBC QN AUTO: 29.5 PG (ref 26–34)
MCHC RBC AUTO-ENTMCNC: 33.6 G/DL (ref 31–37)
MCV RBC AUTO: 87.8 FL (ref 80–100)
MONOCYTES # BLD: 7 % (ref 1–7)
NRBC AUTOMATED: NORMAL PER 100 WBC
PDW BLD-RTO: 13 % (ref 11.5–14.9)
PHOSPHORUS: 2.9 MG/DL (ref 2.6–4.5)
PLATELET # BLD: 202 K/UL (ref 150–450)
PLATELET ESTIMATE: NORMAL
PMV BLD AUTO: 8.1 FL (ref 6–12)
POTASSIUM SERPL-SCNC: 4.7 MMOL/L (ref 3.7–5.3)
RBC # BLD: 4.49 M/UL (ref 4–5.2)
RBC # BLD: NORMAL 10*6/UL
SEG NEUTROPHILS: 61 % (ref 36–66)
SEGMENTED NEUTROPHILS ABSOLUTE COUNT: 3.9 K/UL (ref 1.3–9.1)
SODIUM BLD-SCNC: 136 MMOL/L (ref 135–144)
TOTAL PROTEIN: 7.5 G/DL (ref 6.4–8.3)
TRIGL SERPL-MCNC: 184 MG/DL
URIC ACID: 7.1 MG/DL (ref 2.4–5.7)
VLDLC SERPL CALC-MCNC: ABNORMAL MG/DL (ref 1–30)
WBC # BLD: 6.3 K/UL (ref 3.5–11)
WBC # BLD: NORMAL 10*3/UL

## 2021-03-20 PROCEDURE — 80053 COMPREHEN METABOLIC PANEL: CPT

## 2021-03-20 PROCEDURE — 85025 COMPLETE CBC W/AUTO DIFF WBC: CPT

## 2021-03-20 PROCEDURE — 80197 ASSAY OF TACROLIMUS: CPT

## 2021-03-20 PROCEDURE — 84100 ASSAY OF PHOSPHORUS: CPT

## 2021-03-20 PROCEDURE — 84550 ASSAY OF BLOOD/URIC ACID: CPT

## 2021-03-20 PROCEDURE — 36415 COLL VENOUS BLD VENIPUNCTURE: CPT

## 2021-03-20 PROCEDURE — 83735 ASSAY OF MAGNESIUM: CPT

## 2021-03-20 PROCEDURE — 80061 LIPID PANEL: CPT

## 2021-03-22 LAB — PROGRAF: 5.2 NG/ML

## 2021-04-21 ENCOUNTER — HOSPITAL ENCOUNTER (OUTPATIENT)
Dept: WOMENS IMAGING | Age: 51
Discharge: HOME | End: 2021-04-23
Payer: COMMERCIAL

## 2021-04-21 DIAGNOSIS — Z12.39 SCREENING BREAST EXAMINATION: ICD-10-CM

## 2021-04-21 PROCEDURE — 77063 BREAST TOMOSYNTHESIS BI: CPT

## 2021-05-01 ENCOUNTER — HOSPITAL ENCOUNTER (OUTPATIENT)
Age: 51
Discharge: HOME | End: 2021-05-01
Payer: COMMERCIAL

## 2021-05-01 LAB
ABSOLUTE EOS #: 0.2 K/UL (ref 0–0.4)
ABSOLUTE IMMATURE GRANULOCYTE: NORMAL K/UL (ref 0–0.3)
ABSOLUTE LYMPH #: 1.4 K/UL (ref 1–4.8)
ABSOLUTE MONO #: 0.4 K/UL (ref 0.1–1.3)
ALBUMIN SERPL-MCNC: 4.1 G/DL (ref 3.5–5.2)
ALBUMIN/GLOBULIN RATIO: ABNORMAL (ref 1–2.5)
ALP BLD-CCNC: 66 U/L (ref 35–104)
ALT SERPL-CCNC: 22 U/L (ref 5–33)
ANION GAP SERPL CALCULATED.3IONS-SCNC: 10 MMOL/L (ref 9–17)
AST SERPL-CCNC: 21 U/L
BASOPHILS # BLD: 1 % (ref 0–2)
BASOPHILS ABSOLUTE: 0 K/UL (ref 0–0.2)
BILIRUB SERPL-MCNC: 0.7 MG/DL (ref 0.3–1.2)
BILIRUBIN DIRECT: 0.13 MG/DL
BILIRUBIN, INDIRECT: 0.57 MG/DL (ref 0–1)
BUN BLDV-MCNC: 13 MG/DL (ref 6–20)
CALCIUM SERPL-MCNC: 9.2 MG/DL (ref 8.6–10.4)
CHLORIDE BLD-SCNC: 104 MMOL/L (ref 98–107)
CHOLESTEROL/HDL RATIO: 5
CHOLESTEROL: 221 MG/DL
CO2: 24 MMOL/L (ref 20–31)
CREAT SERPL-MCNC: 0.91 MG/DL (ref 0.5–0.9)
DIFFERENTIAL TYPE: NORMAL
EOSINOPHILS RELATIVE PERCENT: 3 % (ref 0–4)
GFR AFRICAN AMERICAN: >60 ML/MIN
GFR NON-AFRICAN AMERICAN: >60 ML/MIN
GFR SERPL CREATININE-BSD FRML MDRD: ABNORMAL ML/MIN/{1.73_M2}
GFR SERPL CREATININE-BSD FRML MDRD: ABNORMAL ML/MIN/{1.73_M2}
GLUCOSE FASTING: 116 MG/DL (ref 70–99)
HCT VFR BLD CALC: 39.5 % (ref 36–46)
HDLC SERPL-MCNC: 44 MG/DL
HEMOGLOBIN: 13.3 G/DL (ref 12–16)
IMMATURE GRANULOCYTES: NORMAL %
LDL CHOLESTEROL: 143 MG/DL (ref 0–130)
LYMPHOCYTES # BLD: 25 % (ref 24–44)
MAGNESIUM: 1.7 MG/DL (ref 1.6–2.6)
MCH RBC QN AUTO: 29.6 PG (ref 26–34)
MCHC RBC AUTO-ENTMCNC: 33.7 G/DL (ref 31–37)
MCV RBC AUTO: 88 FL (ref 80–100)
MONOCYTES # BLD: 7 % (ref 1–7)
NRBC AUTOMATED: NORMAL PER 100 WBC
PDW BLD-RTO: 13.3 % (ref 11.5–14.9)
PHOSPHORUS: 2.6 MG/DL (ref 2.6–4.5)
PLATELET # BLD: 178 K/UL (ref 150–450)
PLATELET ESTIMATE: NORMAL
PMV BLD AUTO: 8.3 FL (ref 6–12)
POTASSIUM SERPL-SCNC: 4.8 MMOL/L (ref 3.7–5.3)
RBC # BLD: 4.48 M/UL (ref 4–5.2)
RBC # BLD: NORMAL 10*6/UL
SEG NEUTROPHILS: 64 % (ref 36–66)
SEGMENTED NEUTROPHILS ABSOLUTE COUNT: 3.7 K/UL (ref 1.3–9.1)
SODIUM BLD-SCNC: 138 MMOL/L (ref 135–144)
TOTAL PROTEIN: 7.2 G/DL (ref 6.4–8.3)
TRIGL SERPL-MCNC: 172 MG/DL
URIC ACID: 7.9 MG/DL (ref 2.4–5.7)
VLDLC SERPL CALC-MCNC: ABNORMAL MG/DL (ref 1–30)
WBC # BLD: 5.7 K/UL (ref 3.5–11)
WBC # BLD: NORMAL 10*3/UL

## 2021-05-01 PROCEDURE — 80061 LIPID PANEL: CPT

## 2021-05-01 PROCEDURE — 80053 COMPREHEN METABOLIC PANEL: CPT

## 2021-05-01 PROCEDURE — 80197 ASSAY OF TACROLIMUS: CPT

## 2021-05-01 PROCEDURE — 36415 COLL VENOUS BLD VENIPUNCTURE: CPT

## 2021-05-01 PROCEDURE — 85025 COMPLETE CBC W/AUTO DIFF WBC: CPT

## 2021-05-01 PROCEDURE — 83735 ASSAY OF MAGNESIUM: CPT

## 2021-05-01 PROCEDURE — 82248 BILIRUBIN DIRECT: CPT

## 2021-05-01 PROCEDURE — 84100 ASSAY OF PHOSPHORUS: CPT

## 2021-05-01 PROCEDURE — 84550 ASSAY OF BLOOD/URIC ACID: CPT

## 2021-05-04 LAB — PROGRAF: 5.3 NG/ML

## 2021-06-28 ENCOUNTER — TELEPHONE (OUTPATIENT)
Dept: PRIMARY CARE CLINIC | Age: 51
End: 2021-06-28

## 2021-06-28 NOTE — TELEPHONE ENCOUNTER
----- Message from Fredis Miller sent at 6/28/2021  1:22 PM EDT -----  Subject: Appointment Request    Reason for Call: Urgent Adult Urinary Problem    QUESTIONS  Type of Appointment? Established Patient  Reason for appointment request? No appointments available during search  Additional Information for Provider? pt is needing an appt for frequency   burning and irritation duration few weeks - also having arthritis issues   bilateral knees elbows and shoulders. ---------------------------------------------------------------------------  --------------  Bobo ENGLISH  What is the best way for the office to contact you? OK to leave message on   voicemail  Preferred Call Back Phone Number? [phone]  ---------------------------------------------------------------------------  --------------  SCRIPT ANSWERS  Relationship to Patient? Self  Appointment reason? Symptomatic  Select script based on patient symptoms? Adult Urinary Symptoms   [Urine-related, UTI, Bladder Infection]  Are you having severe back pain with your urinary symptoms? No  Are you having vomiting or nausea? No  Is there blood in your urine? No  Are you having fevers (100.4), chills, or sweats? No  Have you been seen by a provider for this symptom before? No  Have you been diagnosed with, awaiting test results for, or told that you   are suspected of having COVID-19 (Coronavirus)? (If patient has tested   negative or was tested as a requirement for work, school, or travel and   not based on symptoms, answer no)? No  Do you currently have flu-like symptoms including fever or chills, cough,   shortness of breath, difficulty breathing, or new loss of taste or smell? No  Have you had close contact with someone with COVID-19 in the last 14 days? No  (Service Expert  click yes below to proceed with ensembli As Usual   Scheduling)?  Yes

## 2021-06-30 ENCOUNTER — OFFICE VISIT (OUTPATIENT)
Dept: PRIMARY CARE CLINIC | Age: 51
End: 2021-06-30
Payer: COMMERCIAL

## 2021-06-30 ENCOUNTER — HOSPITAL ENCOUNTER (OUTPATIENT)
Age: 51
Setting detail: SPECIMEN
Discharge: HOME | End: 2021-06-30
Payer: COMMERCIAL

## 2021-06-30 VITALS
SYSTOLIC BLOOD PRESSURE: 128 MMHG | OXYGEN SATURATION: 97 % | DIASTOLIC BLOOD PRESSURE: 88 MMHG | HEIGHT: 68 IN | TEMPERATURE: 98.7 F | HEART RATE: 97 BPM | WEIGHT: 264.4 LBS | BODY MASS INDEX: 40.07 KG/M2

## 2021-06-30 DIAGNOSIS — M25.561 ARTHRALGIA OF BOTH KNEES: ICD-10-CM

## 2021-06-30 DIAGNOSIS — M25.50 ARTHRALGIA OF MULTIPLE JOINTS: ICD-10-CM

## 2021-06-30 DIAGNOSIS — N76.6 VULVAR ULCER: ICD-10-CM

## 2021-06-30 DIAGNOSIS — Z12.11 SCREENING FOR COLON CANCER: ICD-10-CM

## 2021-06-30 DIAGNOSIS — Z94.0 RENAL TRANSPLANT RECIPIENT: ICD-10-CM

## 2021-06-30 DIAGNOSIS — N89.8 VAGINAL LESION: Primary | ICD-10-CM

## 2021-06-30 DIAGNOSIS — R30.0 DYSURIA: ICD-10-CM

## 2021-06-30 DIAGNOSIS — M25.562 ARTHRALGIA OF BOTH KNEES: ICD-10-CM

## 2021-06-30 DIAGNOSIS — Z23 NEED FOR VACCINATION: ICD-10-CM

## 2021-06-30 LAB
BILIRUBIN, POC: NORMAL
BLOOD URINE, POC: NORMAL
CLARITY, POC: NORMAL
COLOR, POC: YELLOW
GLUCOSE URINE, POC: NORMAL
KETONES, POC: NORMAL
LEUKOCYTE EST, POC: NORMAL
NITRITE, POC: NORMAL
PH, POC: 6
PROTEIN, POC: NORMAL
SPECIFIC GRAVITY, POC: 1.02
UROBILINOGEN, POC: NORMAL

## 2021-06-30 PROCEDURE — 81003 URINALYSIS AUTO W/O SCOPE: CPT | Performed by: PHYSICIAN ASSISTANT

## 2021-06-30 PROCEDURE — 90732 PPSV23 VACC 2 YRS+ SUBQ/IM: CPT | Performed by: PHYSICIAN ASSISTANT

## 2021-06-30 PROCEDURE — 90471 IMMUNIZATION ADMIN: CPT | Performed by: PHYSICIAN ASSISTANT

## 2021-06-30 PROCEDURE — 99214 OFFICE O/P EST MOD 30 MIN: CPT | Performed by: PHYSICIAN ASSISTANT

## 2021-06-30 RX ORDER — CIPROFLOXACIN 500 MG/1
500 TABLET, FILM COATED ORAL 2 TIMES DAILY
Qty: 14 TABLET | Refills: 0 | Status: SHIPPED | OUTPATIENT
Start: 2021-06-30 | End: 2021-07-07

## 2021-06-30 RX ORDER — LIDOCAINE HYDROCHLORIDE 40 MG/ML
SOLUTION TOPICAL
Qty: 1 TUBE | Refills: 1 | Status: SHIPPED | OUTPATIENT
Start: 2021-06-30 | End: 2021-07-07

## 2021-06-30 SDOH — ECONOMIC STABILITY: FOOD INSECURITY: WITHIN THE PAST 12 MONTHS, YOU WORRIED THAT YOUR FOOD WOULD RUN OUT BEFORE YOU GOT MONEY TO BUY MORE.: NEVER TRUE

## 2021-06-30 SDOH — ECONOMIC STABILITY: FOOD INSECURITY: WITHIN THE PAST 12 MONTHS, THE FOOD YOU BOUGHT JUST DIDN'T LAST AND YOU DIDN'T HAVE MONEY TO GET MORE.: NEVER TRUE

## 2021-06-30 ASSESSMENT — SOCIAL DETERMINANTS OF HEALTH (SDOH): HOW HARD IS IT FOR YOU TO PAY FOR THE VERY BASICS LIKE FOOD, HOUSING, MEDICAL CARE, AND HEATING?: NOT HARD AT ALL

## 2021-06-30 NOTE — PROGRESS NOTES
[address] [place] [address] PRIMARY CARE  [address]  [address] [address]  Dept: [phone] Abilio Ornelas is a 44 y.o. female Established patient, who presents today for her medical conditions/complaints as noted below. Chief Complaint   Patient presents with    Vaginitis     Vaginal irritation (Started few weeks ago, just getting worse)       HPI:     HPI   Vaginal irritation, feels inflamed for a couple weeks. Moreso on the right side. Hurts to sit. Tried to put neosporin on it. No vaginal discharge. Does not wear panty liners to cause irritation. Hx of hysterectomy. Says it was removed d/t non-cancerous reason. However, she says she was treated for pre-cancerous cervical cells at age 23. Denies concern for STDs- been with current partner for 30 years. Says she was told she had a single herpes outbreak about 30 years ago. Also c/o some burning with urination, but thought it was because the urine was hitting the irritated vaginal area. Has some pressure/burning in the bladder area though, as well. Does not think she has had a fever, no N/V. No hx of kidney stones. Hx of bilat nephrectomy with kidney transplant d/t polycystic kidney disease. Had some LLQ a couple times where her single kidney is. Pt said she does not see a nephrologist; Goes to a urology clinic at [facility] where she had the transplant, but does not like her doctor there. Wants to see Dr. Frank Nuñez who her Mother sees. May have had a colonoscopy in her late 35s or 42's and thinks it was normal, but no recent colonoscopy. No family hx of colon cancer. Has not seen blood in the stool. Says she switches between constipation and diarrhea ever since her kidney transplant. C/o pain in her elbows, shoulders, hips, and knees. Knees are the worst. The left knee is stiffer, but the right one gives out more frequently. Both lock up some times. No knee swelling or redness.   Tylenol does not help much. Maternal aunt had rheumatoid arthritis. Reviewed prior notes None  Reviewed previous Labs- Pap smear from 2018    LDL Cholesterol (mg/dL)   Date Value   2021 143 (H)   2021 143 (H)   2021 124       (goal LDL is <100)   AST (U/L)   Date Value   2021 21     ALT (U/L)   Date Value   2021 22     BUN (mg/dL)   Date Value   2021 13     Hemoglobin A1C (%)   Date Value   10/17/2020 5.8     BP Readings from Last 3 Encounters:   21 128/88   19 124/86   18 124/72          (goal 120/80)    Past Medical History:   Diagnosis Date    Gout     Hemodialysis access site with arteriovenous graft ([facility] [address]     Polycystic kidney disease     bilateral    Restless legs syndrome       Past Surgical History:   Procedure Laterality Date    CHOLECYSTECTOMY      GALLBLADDER SURGERY      LAPAROSCOPIC    HERNIA REPAIR      MULIPTLE ABDOMINAL WALL FROM PERITONEAL DIALYSIS AND ABD SURGERIES    HYSTERECTOMY      PARTIAL- BOTH OVARIES STILL THERE    KIDNEY TRANSPLANT      MANDIBLE FRACTURE SURGERY      FOR BITE CORRECTION- NO TRAUMA    PARATHYROID GLAND SURGERY      REMOVED 1 GLAND    TOTAL NEPHRECTOMY Bilateral     POLYCYSTIC KIDNEY       Family History   Problem Relation Age of Onset    Kidney Disease Mother         polycystic kidney disease.      High Blood Pressure Father     Cancer Maternal Uncle         nonhodgkins lymphoma    Diabetes Maternal Grandmother     Other Maternal Grandfather         brain aneurysm       Social History     Tobacco Use    Smoking status: Former Smoker     Packs/day: 0.25     Years: 5.00     Pack years: 1.25     Types: Cigarettes     Quit date: 1994     Years since quittin.5    Smokeless tobacco: Never Used   Substance Use Topics    Alcohol use: Yes     Comment: SOCIAL      Current Outpatient Medications   Medication Sig Dispense Refill    diclofenac sodium (VOLTAREN) 1 % GEL Apply 4 g topically 4 times daily as needed for Pain On knees 100 g 0    lidocaine (XYLOCAINE) 4 % external solution Apply topically as needed 3-4x daily. 1 Tube 1    ciprofloxacin (CIPRO) 500 MG tablet Take 1 tablet by mouth 2 times daily for 7 days 14 tablet 0    tacrolimus (PROGRAF) 1 MG capsule 4 tabs bid plus 0.5 mg bid for total of 4.5 mg bid 60 capsule 0    tacrolimus (PROGRAF) 0.5 MG capsule Take 1 capsule by mouth 2 times daily For total of 4.5 mg bid 60 capsule 0    furosemide (LASIX) 40 MG tablet Indications: PRN       aspirin 81 MG tablet Take 81 mg by mouth daily      cloNIDine (CATAPRES) 0.1 MG tablet Take 0.1 mg by mouth 2 times daily      carvedilol (COREG) 3.125 MG tablet Take 3.125 mg by mouth 2 times daily (with meals)      mycophenolate (MYFORTIC) 180 MG DR tablet Take 360 mg by mouth 2 times daily 2 tablets twice daily      simvastatin (ZOCOR) 20 MG tablet TAKE 1 TABLET DAILY AT BEDTIME (Patient not taking: Reported on 6/30/2021) 90 tablet 3     No current facility-administered medications for this visit.      Allergies   Allergen Reactions    Cat Hair Extract     Chloraprep One Step [Chlorhexidine Gluconate]     Dust Mite Extract     Morphine      Itching and hives    Seasonal        Health Maintenance   Topic Date Due    Hepatitis C screen  Never done    COVID-19 Vaccine (1) Never done    HIV screen  Never done    Shingles Vaccine (1 of 2) Never done    Colon cancer screen colonoscopy  Never done    Flu vaccine (1) 09/01/2021    Cervical cancer screen  09/14/2021    A1C test (Diabetic or Prediabetic)  10/17/2021    Lipid screen  05/01/2022    Potassium monitoring  05/01/2022    Creatinine monitoring  05/01/2022    Breast cancer screen  04/21/2023    DTaP/Tdap/Td vaccine (2 - Td or Tdap) 08/24/2030    Pneumococcal 0-64 years Vaccine (4 of 4 - PPSV23) 08/18/2033    Hepatitis A vaccine  Aged Out    Hepatitis B vaccine  Aged Out    Hib vaccine  Aged Out    Meningococcal (ACWY) vaccine  Aged Out Subjective:      Review of Systems   Constitutional: Negative for fever. Gastrointestinal: Positive for abdominal pain (bladder pressure). Negative for blood in stool, nausea and vomiting. Genitourinary: Positive for dysuria, frequency and vaginal pain. Negative for vaginal discharge. Musculoskeletal: Positive for arthralgias (elbows, shoulders, hips, knees). Negative for joint swelling. Objective:     /88   Pulse 97   Temp 98.7 °F (37.1 °C)   Ht 5' 8.25\" (1.734 m)   Wt 264 lb 6.4 oz (119.9 kg)   SpO2 97%   BMI 39.91 kg/m²   Physical Exam  Vitals and nursing note reviewed. Constitutional:       Appearance: Normal appearance. She is obese. HENT:      Head: Normocephalic and atraumatic. Cardiovascular:      Rate and Rhythm: Normal rate and regular rhythm. Pulmonary:      Effort: Pulmonary effort is normal.      Breath sounds: Normal breath sounds. Abdominal:      General: Bowel sounds are normal. There is no distension. Palpations: Abdomen is soft. Tenderness: There is abdominal tenderness in the suprapubic area. There is no guarding or rebound. Genitourinary:      Musculoskeletal:      Right knee: Bony tenderness (on patella) present. Left knee: No bony tenderness. Neurological:      Mental Status: She is alert. Assessment/Plan:   1. Vaginal lesion    -Large vulvar lesion: genital wart vs. Vulvar cancer? Advised pt to see gynecology for possible surgical excision.   -     Blanca Daniels DO, Gynecology, [place]  -     lidocaine (XYLOCAINE) 4 % external solution; Apply topically as needed 3-4x daily. , Disp-1 Tube, R-1, Normal  2. Vulvar ulcer  -Topical lidocaine for painful single ulcer.   -     lidocaine (XYLOCAINE) 4 % external solution; Apply topically as needed 3-4x daily. , Disp-1 Tube, R-1, Normal  3. Dysuria  -     POCT Urinalysis No Micro (Auto): UA showed small leukocytes. -     Culture, Urine;  Future  -     ciprofloxacin (CIPRO) 500 MG tablet; Take 1 tablet by mouth 2 times daily for 7 days, Disp-14 tablet, R-0Normal  4. Renal transplant recipient  -     [facility]) - Argenis Zuñiga MD, Nephrology, [place]  -     ciprofloxacin (CIPRO) 500 MG tablet; Take 1 tablet by mouth 2 times daily for 7 days, Disp-14 tablet, R-0Normal  5. Screening for colon cancer  -     Terry Schulte DO, General Surgery, [place]  6. Arthralgia of multiple joints  -     WILD Screen with Reflex; Future  -     C-Reactive Protein; Future  -     Sedimentation rate, automated; Future  -     Rheumatoid Factor; Future  -     Cyclic Citrul Peptide Antibody, IgG; Future  -     diclofenac sodium (VOLTAREN) 1 % GEL; Apply 4 g topically 4 times daily as needed for Pain On knees, Topical, 4 TIMES DAILY PRN Starting Wed 6/30/2021, Disp-100 g, R-0, Normal  7. Arthralgia of both knees  -     diclofenac sodium (VOLTAREN) 1 % GEL; Apply 4 g topically 4 times daily as needed for Pain On knees, Topical, 4 TIMES DAILY PRN Starting Wed 6/30/2021, Disp-100 g, R-0, Normal  -Pt declined referral to physical therapy or knee xrays at this time. 8. Need for vaccination  -     PNEUMOVAX 23 subcutaneous/IM (Pneumococcal polysaccharide vaccine 23-valent >= 1yo) (Due to hx of kidney transplant, pt is due for her 2nd dose of pneumovax 23). Return in about 4 months (around 10/30/2021) for Physical with Dr. Lucio William. Orders Placed This Encounter   Procedures    Culture, Urine     Standing Status:   Future     Number of Occurrences:   1     Standing Expiration Date:   6/30/2022     Order Specific Question:   Specify (ex-cath, midstream, cysto, etc)?      Answer:   midstream    PNEUMOVAX 23 subcutaneous/IM (Pneumococcal polysaccharide vaccine 23-valent >= 1yo)    WILD Screen with Reflex     Standing Status:   Future     Standing Expiration Date:   6/30/2022    C-Reactive Protein     Standing Status:   Future     Standing Expiration Date:   7/1/2022    Sedimentation rate, to continue current medications, diet and exercise. Patient agreed with treatment plan. Follow up as directed.      Electronically signed by Cait Gauthier PA-C on 7/1/2021 at 10:07 AM

## 2021-07-01 PROBLEM — N89.8 VAGINAL LESION: Status: ACTIVE | Noted: 2021-07-01

## 2021-07-01 PROBLEM — M25.50 ARTHRALGIA OF MULTIPLE JOINTS: Status: ACTIVE | Noted: 2021-07-01

## 2021-07-01 PROBLEM — M25.561 ARTHRALGIA OF BOTH KNEES: Status: ACTIVE | Noted: 2021-07-01

## 2021-07-01 PROBLEM — M25.562 ARTHRALGIA OF BOTH KNEES: Status: ACTIVE | Noted: 2021-07-01

## 2021-07-01 LAB
CULTURE: NORMAL
Lab: NORMAL
SPECIMEN DESCRIPTION: NORMAL

## 2021-07-01 ASSESSMENT — ENCOUNTER SYMPTOMS
ABDOMINAL PAIN: 1
NAUSEA: 0
VOMITING: 0
BLOOD IN STOOL: 0

## 2021-07-23 DIAGNOSIS — M25.50 ARTHRALGIA OF MULTIPLE JOINTS: ICD-10-CM

## 2021-07-23 DIAGNOSIS — M25.562 ARTHRALGIA OF BOTH KNEES: ICD-10-CM

## 2021-07-23 DIAGNOSIS — M25.561 ARTHRALGIA OF BOTH KNEES: ICD-10-CM

## 2021-08-04 ENCOUNTER — TELEPHONE (OUTPATIENT)
Dept: PRIMARY CARE CLINIC | Age: 51
End: 2021-08-04

## 2021-08-07 ENCOUNTER — HOSPITAL ENCOUNTER (OUTPATIENT)
Age: 51
Discharge: HOME | End: 2021-08-07
Payer: COMMERCIAL

## 2021-08-07 DIAGNOSIS — M25.50 ARTHRALGIA OF MULTIPLE JOINTS: ICD-10-CM

## 2021-08-07 LAB
C-REACTIVE PROTEIN: 11.8 MG/L (ref 0–5)
RHEUMATOID FACTOR: <10 IU/ML
SEDIMENTATION RATE, ERYTHROCYTE: 17 MM (ref 0–30)

## 2021-08-07 PROCEDURE — 85652 RBC SED RATE AUTOMATED: CPT

## 2021-08-07 PROCEDURE — 36415 COLL VENOUS BLD VENIPUNCTURE: CPT

## 2021-08-07 PROCEDURE — 86431 RHEUMATOID FACTOR QUANT: CPT

## 2021-08-07 PROCEDURE — 86140 C-REACTIVE PROTEIN: CPT

## 2021-08-07 PROCEDURE — 86038 ANTINUCLEAR ANTIBODIES: CPT

## 2021-08-07 PROCEDURE — 86225 DNA ANTIBODY NATIVE: CPT

## 2021-08-07 PROCEDURE — 86200 CCP ANTIBODY: CPT

## 2021-08-09 LAB
ANTI DNA DOUBLE STRANDED: 1 IU/ML
ANTI-NUCLEAR ANTIBODY (ANA): NEGATIVE
CCP IGG ANTIBODIES: 1.3 U/ML (ref 0–7)
ENA ANTIBODIES SCREEN: 0.6 U/ML

## 2021-08-18 ENCOUNTER — OFFICE VISIT (OUTPATIENT)
Dept: OBGYN CLINIC | Age: 51
End: 2021-08-18
Payer: COMMERCIAL

## 2021-08-18 ENCOUNTER — HOSPITAL ENCOUNTER (OUTPATIENT)
Age: 51
Setting detail: SPECIMEN
Discharge: HOME | End: 2021-08-18
Payer: COMMERCIAL

## 2021-08-18 VITALS
BODY MASS INDEX: 38.66 KG/M2 | DIASTOLIC BLOOD PRESSURE: 68 MMHG | SYSTOLIC BLOOD PRESSURE: 108 MMHG | WEIGHT: 261 LBS | HEIGHT: 69 IN

## 2021-08-18 DIAGNOSIS — Z01.419 WELL WOMAN EXAM WITH ROUTINE GYNECOLOGICAL EXAM: Primary | ICD-10-CM

## 2021-08-18 DIAGNOSIS — N90.89 VULVAR MASS: ICD-10-CM

## 2021-08-18 DIAGNOSIS — Z11.51 SCREENING FOR HPV (HUMAN PAPILLOMAVIRUS): ICD-10-CM

## 2021-08-18 DIAGNOSIS — Z01.419 WELL WOMAN EXAM WITH ROUTINE GYNECOLOGICAL EXAM: ICD-10-CM

## 2021-08-18 PROCEDURE — 99214 OFFICE O/P EST MOD 30 MIN: CPT | Performed by: NURSE PRACTITIONER

## 2021-08-18 RX ORDER — MULTIVITAMIN/IRON/FOLIC ACID 18MG-0.4MG
TABLET ORAL
COMMUNITY
End: 2021-08-19

## 2021-08-18 RX ORDER — ASPIRIN 81 MG/1
TABLET ORAL
COMMUNITY
End: 2021-08-18 | Stop reason: SDUPTHER

## 2021-08-18 NOTE — PROGRESS NOTES
mited pelvic u/s    History and Physical  [address], [address] [facility] [address], [address] [facility] [phone]   Fax [phone]  Domitila Rascon  8/18/2021              46 y.o. Chief Complaint   Patient presents with   Hilda Kingston Doctor    Annual Exam       No LMP recorded. Patient has had a hysterectomy. Primary Care Physician: Ellie Galvez MD    The patient was seen and examined. She is here for her annual exam. States she saw her PCP for labial soreness/ vulvar ulcer on 6/30/20210 using neosporin without relief. States PCP told her it was a large genital wart. HX HSV. Hx abnormal pap smear. Patient is a  Kidney transplant recipent and on immunosuppressants. Her bowels are regular. There are no voiding complaints. She denies any bloating. She denies vaginal discharge and was counseled on STD's and the need for barrier contraception. HPI : Domitila Rascon is a 46 y.o. female No obstetric history on file. Annual exam  Vulvar mass  Genital wart vs vulvar cancer  HX HSV  Hx hyst for heavy menses   ________________________________________________________________________  OB History   No obstetric history on file.      Past Medical History:   Diagnosis Date    Gout     Hemodialysis access site with arteriovenous graft ([facility] [address]     Polycystic kidney disease     bilateral    Restless legs syndrome                                                                    Past Surgical History:   Procedure Laterality Date    CHOLECYSTECTOMY      GALLBLADDER SURGERY      LAPAROSCOPIC    HERNIA REPAIR      MULIPTLE ABDOMINAL WALL FROM PERITONEAL DIALYSIS AND ABD SURGERIES    HYSTERECTOMY      PARTIAL- BOTH OVARIES STILL THERE    KIDNEY TRANSPLANT      MANDIBLE FRACTURE SURGERY      FOR BITE CORRECTION- NO TRAUMA    PARATHYROID GLAND SURGERY      REMOVED 1 GLAND    TOTAL NEPHRECTOMY Bilateral     POLYCYSTIC KIDNEY     Family History   Problem Relation Age of Onset    Kidney Disease Mother         polycystic kidney disease.  High Blood Pressure Father     Cancer Maternal Uncle         nonhodgkins lymphoma    Diabetes Maternal Grandmother     Other Maternal Grandfather         brain aneurysm     Social History     Socioeconomic History    Marital status:      Spouse name: Not on file    Number of children: Not on file    Years of education: Not on file    Highest education level: Not on file   Occupational History    Not on file   Tobacco Use    Smoking status: Former Smoker     Packs/day: 0.25     Years: 5.00     Pack years: 1.25     Types: Cigarettes     Quit date: 1994     Years since quittin.6    Smokeless tobacco: Never Used   Substance and Sexual Activity    Alcohol use: Yes     Comment: SOCIAL    Drug use: No    Sexual activity: Not on file   Other Topics Concern    Not on file   Social History Narrative    Not on file     Social Determinants of Health     Financial Resource Strain: Low Risk     Difficulty of Paying Living Expenses: Not hard at all   Food Insecurity: No Food Insecurity    Worried About [address] in the Last Year: Never true    [address] MEEP in the Last Year: Never true   Transportation Needs:     Lack of Transportation (Medical):      Lack of Transportation (Non-Medical):    Physical Activity:     Days of Exercise per Week:     Minutes of Exercise per Session:    Stress:     Feeling of Stress :    Social Connections:     Frequency of Communication with Friends and Family:     Frequency of Social Gatherings with Friends and Family:     Attends Orthodoxy Services:     Active Member of Clubs or Organizations:     Attends Club or Organization Meetings:     Marital Status:    Intimate Partner Violence:     Fear of Current or Ex-Partner:     Emotionally Abused:     Physically Abused:     Sexually Abused:        MEDICATIONS:  Current Outpatient Medications   Medication Sig Dispense Refill    tacrolimus (PROGRAF) 1 MG capsule 4 tabs bid plus 0.5 mg bid for total of 4.5 mg bid 60 capsule 0    tacrolimus (PROGRAF) 0.5 MG capsule Take 1 capsule by mouth 2 times daily For total of 4.5 mg bid 60 capsule 0    furosemide (LASIX) 40 MG tablet Indications: PRN       mycophenolate (MYFORTIC) 180 MG DR tablet Take 360 mg by mouth 2 times daily 2 tablets twice daily      Cholecalciferol 50 MCG (2000 UT) TABS Vitamin D3 50 mcg (2,000 unit) tablet   Take 1 tablet every day by oral route. (Patient not taking: Reported on 8/18/2021)      Magnesium Oxide (MAGNESIUM-OXIDE) 250 MG TABS tablet magnesium 250 mg (as magnesium oxide) tablet   Take 1 tablet twice a day by oral route as directed. (Patient not taking: Reported on 8/18/2021)      diclofenac sodium (VOLTAREN) 1 % GEL APPLY 4 G TOPICALLY 4 TIMES DAILY AS NEEDED FOR PAIN ON KNEES (Patient not taking: Reported on 8/18/2021) 100 g 0    simvastatin (ZOCOR) 20 MG tablet TAKE 1 TABLET DAILY AT BEDTIME (Patient not taking: Reported on 6/30/2021) 90 tablet 3    cloNIDine (CATAPRES) 0.1 MG tablet Take 0.1 mg by mouth 2 times daily (Patient not taking: Reported on 8/18/2021)      carvedilol (COREG) 3.125 MG tablet Take 3.125 mg by mouth 2 times daily (with meals) (Patient not taking: Reported on 8/18/2021)       No current facility-administered medications for this visit. ALLERGIES:  Allergies as of 08/18/2021 - Fully Reviewed 08/18/2021   Allergen Reaction Noted    Cat hair extract  09/20/2019    Chloraprep one step [chlorhexidine gluconate]  10/21/2015    Dust mite extract  10/21/2015    Morphine  10/21/2015    Seasonal  09/20/2019       Symptoms of decreased mood absent  Symptoms of anhedonia absent    **If either question is answered in a  positive fashion then complete the PHQ9 Scoring Evaluation and make the appropriate referral**      Immunization status: stated as current, but no records available.       Gynecologic History:  Menarche: 13 yo  Menopause at [facility] at 27 yo     No LMP recorded. Patient has had a hysterectomy. Sexually Active: No    STD History: Yes HSV     Permanent Sterilization: Yes hyst   Reversible Birth Control: No        Hormone Replacement Exposure: No      Genetic Qualified Family History of Breast, Ovarian , Colon or Uterine Cancer: Yes Aunt breast cancer age 43     If YES see scanned worksheet. Preventative Health Testing:    Health Maintenance:  Health Maintenance Due   Topic Date Due    Hepatitis C screen  Never done    COVID-19 Vaccine (1) Never done    HIV screen  Never done    Colon cancer screen colonoscopy  Never done    Shingles Vaccine (1 of 2) Never done    Cervical cancer screen  09/14/2021       Date of Last Pap Smear: 2018 neg  Abnormal Pap Smear History: admits at age 23  Colposcopy History:   Date of Last Mammogram: 4/21/2021 neg  Date of Last Colonoscopy:   Date of Last Bone Density:      ________________________________________________________________________        REVIEW OF SYSTEMS:    yes   A minimum of an eleven point review of systems was completed. Review Of Systems (11 point):  Constitutional: No fever, chills or malaise; No weight change or fatigue  Head and Eyes: No vision, Headache, Dizziness or trauma in last 12 months  ENT ROS: No hearing, Tinnitis, sinus or taste problems  Hematological and Lymphatic ROS:No Lymphoma, Von Willebrand's, Hemophillia or Bleeding History  Psych ROS: No Depression, Homicidal thoughts,suicidal thoughts, or anxiety  Breast ROS: No prior breast abnormalities or lumps  Respiratory ROS: No SOB, Pneumoniae,Cough, or Pulmonary Embolism History  Cardiovascular ROS: No Chest Pain with Exertion, Palpitations, Syncope, Edema, Arrhythmia  Gastrointestinal ROS: No Indigestion, Heartburn, Nausea, vomiting, Diarrhea, Constipation,or Bowel Changes; No Bloody Stools or melena  Genito-Urinary ROS: No Dysuria, Hematuria or Nocturia.  No Urinary Incontinence or Vaginal Discharge  Musculoskeletal ROS: No Arthralgia, Arthritis,Gout,Osteoporosis or Rheumatism  Neurological ROS: No CVA, Migraines, Epilepsy, Seizure Hx, or Limb Weakness  Dermatological ROS: No Rash, Itching, Hives, Mole Changes or Cancer                                                                                                                                                                                                                                  PHYSICAL Exam:     Constitutional:  Vitals:    08/18/21 0907   BP: 108/68   Site: Right Upper Arm   Position: Sitting   Cuff Size: Large Adult   Weight: 261 lb (118.4 kg)   Height: 5' 9\" (1.753 m)       Chaperone for Intimate Exam   Chaperone was offered and accepted as part of the rooming process.  Chaperone: Juwan Bartholomew MA          General Appearance: This  is a well Developed, well Nourished, well groomed female. Her BMI was reviewed. Nutritional decision making was discussed. Skin:  There was a Normal Inspection of the skin without rashes or lesions. There were no rashes. (Papular, Maculopapular, Hives, Pustular, Macular)     There were no lesions (Ulcers, Erythema, Abn. Appearing Nevi)            Lymphatic:  No Lymph Nodes were Palpable in the neck , axilla or groin.  0 # Of Lymph Nodes; Location ; Character [Normal]  [Shotty] [Tender] [Enlarged]     Neck and EENT:  The neck was supple. There were no masses   The thyroid was not enlarged and had no masses. Perrla, EOMI B/L, TMI B/L No Abnormalities. Throat inspected-No exudates or Masses, Nares Patent No Masses        Respiratory: The lungs were auscultated and found to be clear. There were no rales, rhonchi or wheezes. There was a good respiratory effort. Cardiovascular: The heart was in a regular rate and rhythm. . No S3 or S4. There was no murmur appreciated. Location, grade, and radiation are not applicable. Extremities:   The patients extremities were without calf tenderness, edema, or varicosities. There was full range of motion in all four extremities. Pulses in all four extremities were appreciated and are 2/4. Abdomen: The abdomen was soft and non-tender. There were good bowel sounds in all quadrants and there was no guarding, rebound or rigidity. On evaluation there was no evidence of hepatosplenomegaly and there was no costal vertebral xochilt tenderness bilaterally. No hernias were appreciated. Abdominal Scars: none    Psych: The patient had a normal Orientation to: Time, Place, Person, and Situation  There is no Mood / Affect changes    Breast:  (Chest)  normal appearance, no masses or tenderness  Self breast exams were reviewed in detail. Literature was given. Pelvic Exam:  Bimanual exam reveals absent uterus and adnexa. External genitalia: 1.5 inch x 1.5 inch white and pink lesion noted on right side of vaginal introitus. Lumpy in texture. Painful upon palpation. Skin abrasions noted. Rectal Exam:  exam declined by patient          Musculosk:  Normal Gait and station was noted. Digits were evaluated without abnormal findings. Range of motion, stability and strength were evaluated and found to be appropriate for the patients age. ASSESSMENT:      46 y.o. Annual   Diagnosis Orders   1. Well woman exam with routine gynecological exam  PAP SMEAR    Culture, Genital   2.  Screening for HPV (human papillomavirus)  PAP SMEAR   3. Vulvar mass  US PELVIS LIMITED          Chief Complaint   Patient presents with   [facility] Doctor    Annual Exam          Past Medical History:   Diagnosis Date    Gout     Hemodialysis access site with arteriovenous graft ([facility] [address]     Polycystic kidney disease     bilateral    Restless legs syndrome          Patient Active Problem List    Diagnosis Date Noted    Vaginal lesion 07/01/2021    Arthralgia of multiple joints 07/01/2021    Arthralgia of both knees 07/01/2021    Renal transplant recipient 11/11/2016    Allergic rhinitis 10/21/2015    Arm pain 10/21/2015    Arm swelling 10/21/2015    Carotid artery stenosis 10/21/2015    Depression 10/21/2015    Dysuria 10/21/2015    Fistula 10/21/2015    Headache 10/21/2015    History of kidney transplant 10/21/2015    Hyperlipidemia 10/21/2015    Hypertension 10/21/2015    Palpitations 10/21/2015    Post-nasal drip 10/21/2015    Acute cystitis 10/21/2015    Acute pancreatitis 10/21/2015    Edema 12/12/2014    Arteriovenous fistula ([facility] [address] 12/12/2014    Hypertensive heart and kidney disease 11/03/2014    History of renal transplant 03/27/2014    Carotid artery occlusion 06/19/2013    Tachycardia 11/24/2010    Dysphagia 07/09/2010    Malaise and fatigue 07/09/2010    Autosomal dominant polycystic kidney disease 05/29/2010    End-stage renal disease ([facility] [address] 05/29/2010    Sleep apnea 05/29/2010    Depressive disorder 05/29/2010    Abdominal pain 05/29/2010          Hereditary Breast, Ovarian, Colon and Uterine Cancer screening Done. Tobacco & Secondary smoke risks reviewed; instructed on cessation and avoidance      Counseling Completed:  Preventative Health Recommendations and Follow up. PLAN:  Return in about 1 week (around 8/25/2021) for follow up with physician for vulvar mass-vulvar cancer vs genital wart . Pap smear collected  Pelvic u/s ordered  Genital culture collected  Repeat Annual every 1 year  Cervical Cytology Evaluation begins at 24years old. If Negative Cytology, Follow-up screening per current guidelines. Mammograms every 1 year. If 37 yo and last mammogram was negative. Calcium and Vitamin D dosing reviewed. Colonoscopy screening reviewed as well as onset for bone density testing. Birth control and barrier recommendations discussed. STD counseling and prevention reviewed. Gardisil counseling completed for all patients 10-37 yo. Routine health maintenance per patients PCP.   Orders Placed This Encounter   Procedures    Culture, Genital     Standing Status:   Future     Standing Expiration Date:   2022    US PELVIS LIMITED     This procedure can be scheduled via [employer]. Access your [employer] account by visiting [email or web address]. Standing Status:   Future     Standing Expiration Date:   2022     Order Specific Question:   Reason for exam:     Answer:   right vulvar mass    PAP SMEAR     Patient History:    No LMP recorded. Patient has had a hysterectomy. OBGYN Status: Hysterectomy  Past Surgical History:  No date: CHOLECYSTECTOMY  No date: GALLBLADDER SURGERY      Comment:  LAPAROSCOPIC  No date: HERNIA REPAIR      Comment:  MULIPTLE ABDOMINAL WALL FROM PERITONEAL DIALYSIS AND ABD               SURGERIES  No date: HYSTERECTOMY      Comment:  PARTIAL- BOTH OVARIES STILL THERE  No date: KIDNEY TRANSPLANT  No date: MANDIBLE FRACTURE SURGERY      Comment:  FOR BITE CORRECTION- NO TRAUMA  No date: PARATHYROID GLAND SURGERY      Comment:  REMOVED 1 GLAND  No date: TOTAL NEPHRECTOMY; Bilateral      Comment:  POLYCYSTIC KIDNEY      Social History    Tobacco Use      Smoking status: Former Smoker        Packs/day: 0.25        Years: 5.00        Pack years: 1.25        Types: Cigarettes        Quit date: 1994        Years since quittin.6      Smokeless tobacco: Never Used       Standing Status:   Future     Standing Expiration Date:   2022     Order Specific Question:   Collection Type     Answer: Thin Prep     Order Specific Question:   Prior Abnormal Pap Test     Answer:   No     Order Specific Question:   Screening or Diagnostic     Answer:   Screening     Order Specific Question:   HPV Requested? Answer:   Yes     Order Specific Question:   High Risk Patient     Answer:   N/A           The patient, Karen Gill is a 46 y.o. female, was seen with a total time spent of 30 minutes for the visit on this date of service by the E/M provider.  The time component had both face to face and non face to face time spent in determining the total time component. Counseling and education regarding her diagnosis listed below and her options regarding those diagnoses were also included in determining her time component. Diagnosis Orders   1. Well woman exam with routine gynecological exam  PAP SMEAR    Culture, Genital   2. Screening for HPV (human papillomavirus)  PAP SMEAR   3. Vulvar mass  US PELVIS LIMITED        The patient had her preventative health maintenance recommendations and follow-up reviewed with her at the completion of her visit.

## 2021-08-19 ENCOUNTER — OFFICE VISIT (OUTPATIENT)
Dept: OBGYN CLINIC | Age: 51
End: 2021-08-19
Payer: COMMERCIAL

## 2021-08-19 ENCOUNTER — HOSPITAL ENCOUNTER (OUTPATIENT)
Age: 51
Setting detail: SPECIMEN
Discharge: HOME | End: 2021-08-19
Payer: COMMERCIAL

## 2021-08-19 VITALS
HEIGHT: 69 IN | BODY MASS INDEX: 38.8 KG/M2 | WEIGHT: 262 LBS | DIASTOLIC BLOOD PRESSURE: 82 MMHG | SYSTOLIC BLOOD PRESSURE: 118 MMHG

## 2021-08-19 DIAGNOSIS — M25.50 ARTHRALGIA OF MULTIPLE JOINTS: ICD-10-CM

## 2021-08-19 DIAGNOSIS — M25.561 ARTHRALGIA OF BOTH KNEES: ICD-10-CM

## 2021-08-19 DIAGNOSIS — M25.562 ARTHRALGIA OF BOTH KNEES: ICD-10-CM

## 2021-08-19 DIAGNOSIS — N39.41 URGENCY INCONTINENCE: ICD-10-CM

## 2021-08-19 DIAGNOSIS — N76.0 VULVOVAGINITIS: ICD-10-CM

## 2021-08-19 DIAGNOSIS — N90.89 VULVAR MASS: Primary | ICD-10-CM

## 2021-08-19 PROCEDURE — 56605 BIOPSY OF VULVA/PERINEUM: CPT | Performed by: OBSTETRICS & GYNECOLOGY

## 2021-08-19 RX ORDER — CLOTRIMAZOLE AND BETAMETHASONE DIPROPIONATE 10; .64 MG/G; MG/G
CREAM TOPICAL
Qty: 1 TUBE | Refills: 2 | Status: SHIPPED | OUTPATIENT
Start: 2021-08-19 | End: 2021-09-27

## 2021-08-19 NOTE — PROGRESS NOTES
transplant recipient 11/11/2016    Allergic rhinitis 10/21/2015    Arm pain 10/21/2015    Arm swelling 10/21/2015    Carotid artery stenosis 10/21/2015    Depression 10/21/2015    Dysuria 10/21/2015    Fistula 10/21/2015    Headache 10/21/2015    History of kidney transplant 10/21/2015    Hyperlipidemia 10/21/2015    Hypertension 10/21/2015    Palpitations 10/21/2015    Post-nasal drip 10/21/2015    Acute cystitis 10/21/2015    Acute pancreatitis 10/21/2015    Edema 12/12/2014    Arteriovenous fistula ([facility] [address] 12/12/2014    Hypertensive heart and kidney disease 11/03/2014    History of renal transplant 03/27/2014    Carotid artery occlusion 06/19/2013    Tachycardia 11/24/2010    Dysphagia 07/09/2010    Malaise and fatigue 07/09/2010    Autosomal dominant polycystic kidney disease 05/29/2010    End-stage renal disease ([facility] [address] 05/29/2010    Sleep apnea 05/29/2010    Depressive disorder 05/29/2010    Abdominal pain 05/29/2010         Plan:  RTO in 2 Weeks  RX Lotrisone  UA C&S  Ultrasound is pending.   Restrictions Reviewed

## 2021-08-20 LAB
CYTOLOGY REPORT: NORMAL
HPV SAMPLE: NORMAL
HPV, GENOTYPE 16: NOT DETECTED
HPV, GENOTYPE 18: NOT DETECTED
HPV, HIGH RISK OTHER: NOT DETECTED
HPV, INTERPRETATION: NORMAL
SPECIMEN DESCRIPTION: NORMAL
SURGICAL PATHOLOGY REPORT: NORMAL

## 2021-08-21 ENCOUNTER — HOSPITAL ENCOUNTER (OUTPATIENT)
Dept: ULTRASOUND IMAGING | Age: 51
Discharge: HOME | End: 2021-08-23
Payer: COMMERCIAL

## 2021-08-21 DIAGNOSIS — N90.89 VULVAR MASS: ICD-10-CM

## 2021-08-21 LAB
CULTURE: NORMAL
CULTURE: NORMAL
Lab: NORMAL
SPECIMEN DESCRIPTION: NORMAL

## 2021-08-21 PROCEDURE — 76857 US EXAM PELVIC LIMITED: CPT

## 2021-08-23 ENCOUNTER — TELEPHONE (OUTPATIENT)
Dept: OBGYN CLINIC | Age: 51
End: 2021-08-23

## 2021-08-23 NOTE — TELEPHONE ENCOUNTER
----- Message from GOMEZ Pemberton NP sent at 8/20/2021  9:00 AM EDT -----  Pap smear LSIL  HPV neg  Age 46 y.o.   Repeat in 1 year

## 2021-08-24 NOTE — TELEPHONE ENCOUNTER
Patient returned call to the office and was notified that biopsy in office was non diagnostic, encouraged patient to keep appointment with [facility] 9/2 for a formal biopsy.

## 2021-08-25 ENCOUNTER — TELEPHONE (OUTPATIENT)
Dept: OBGYN CLINIC | Age: 51
End: 2021-08-25

## 2021-08-25 NOTE — TELEPHONE ENCOUNTER
LM for pt to call office regarding pap results and biopsy results. Per Dr Kirti Michelle pt will need VV to discuss results. Pt will need scheduled for colposcopy.

## 2021-09-02 ENCOUNTER — PROCEDURE VISIT (OUTPATIENT)
Dept: OBGYN CLINIC | Age: 51
End: 2021-09-02
Payer: COMMERCIAL

## 2021-09-02 ENCOUNTER — HOSPITAL ENCOUNTER (OUTPATIENT)
Age: 51
Setting detail: SPECIMEN
Discharge: HOME | End: 2021-09-02
Payer: COMMERCIAL

## 2021-09-02 VITALS
BODY MASS INDEX: 38.21 KG/M2 | HEIGHT: 69 IN | SYSTOLIC BLOOD PRESSURE: 130 MMHG | DIASTOLIC BLOOD PRESSURE: 62 MMHG | WEIGHT: 258 LBS

## 2021-09-02 DIAGNOSIS — N90.89 VULVAR MASS: ICD-10-CM

## 2021-09-02 DIAGNOSIS — R87.612 LGSIL OF CERVIX OF UNDETERMINED SIGNIFICANCE: Primary | ICD-10-CM

## 2021-09-02 PROCEDURE — 57100 BIOPSY VAGINAL MUCOSA SIMPLE: CPT | Performed by: OBSTETRICS & GYNECOLOGY

## 2021-09-02 PROCEDURE — 57452 EXAM OF CERVIX W/SCOPE: CPT | Performed by: OBSTETRICS & GYNECOLOGY

## 2021-09-02 RX ORDER — DOXYCYCLINE HYCLATE 100 MG
100 TABLET ORAL 2 TIMES DAILY
Qty: 14 TABLET | Refills: 0 | Status: SHIPPED | OUTPATIENT
Start: 2021-09-02 | End: 2021-09-09

## 2021-09-02 NOTE — PROGRESS NOTES
[id number] [facility] Gynecology    COLPOSCOPY PROCEDURE FORM    Chief Complaint:   Chief Complaint   Patient presents with    Procedure         2021  Daiana Mahmood  No LMP recorded. Patient has had a hysterectomy. 46 y.o. No obstetric history on file. Past Medical History:   Diagnosis Date    Gout     Hemodialysis access site with arteriovenous graft ([facility] [address]     Polycystic kidney disease     bilateral    Restless legs syndrome          Past Surgical History:   Procedure Laterality Date    CHOLECYSTECTOMY      GALLBLADDER SURGERY      LAPAROSCOPIC    HERNIA REPAIR      MULIPTLE ABDOMINAL WALL FROM PERITONEAL DIALYSIS AND ABD SURGERIES    HYSTERECTOMY      PARTIAL- BOTH OVARIES STILL THERE    KIDNEY TRANSPLANT      MANDIBLE FRACTURE SURGERY      FOR BITE CORRECTION- NO TRAUMA    PARATHYROID GLAND SURGERY      REMOVED 1 GLAND    TOTAL NEPHRECTOMY Bilateral     POLYCYSTIC KIDNEY       Family History   Problem Relation Age of Onset    Kidney Disease Mother         polycystic kidney disease.      High Blood Pressure Father     Cancer Maternal Uncle         nonhodgkins lymphoma    Diabetes Maternal Grandmother     Other Maternal Grandfather         brain aneurysm       Social History     Socioeconomic History    Marital status:      Spouse name: Not on file    Number of children: Not on file    Years of education: Not on file    Highest education level: Not on file   Occupational History    Not on file   Tobacco Use    Smoking status: Former Smoker     Packs/day: 0.25     Years: 5.00     Pack years: 1.25     Types: Cigarettes     Quit date: 1994     Years since quittin.6    Smokeless tobacco: Never Used   Substance and Sexual Activity    Alcohol use: Yes     Comment: SOCIAL    Drug use: No    Sexual activity: Not on file   Other Topics Concern    Not on file   Social History Narrative    Not on file     Social Determinants of Health     Financial Resource Strain: Low Risk     Difficulty of Paying Living Expenses: Not hard at all   Food Insecurity: No Food Insecurity    Worried About Running Out of Food in the Last Year: Never true    Sushila of Food in the Last Year: Never true   Transportation Needs:     Lack of Transportation (Medical):  Lack of Transportation (Non-Medical):    Physical Activity:     Days of Exercise per Week:     Minutes of Exercise per Session:    Stress:     Feeling of Stress :    Social Connections:     Frequency of Communication with Friends and Family:     Frequency of Social Gatherings with Friends and Family:     Attends Church Services:     Active Member of Clubs or Organizations:     Attends Club or Organization Meetings:     Marital Status:    Intimate Partner Violence:     Fear of Current or Ex-Partner:     Emotionally Abused:     Physically Abused:     Sexually Abused:        Current Outpatient Medications on File Prior to Visit   Medication Sig Dispense Refill    diclofenac sodium (VOLTAREN) 1 % GEL APPLY 4 G TOPICALLY 4 TIMES DAILY AS NEEDED FOR PAIN ON KNEES 100 g 0    clotrimazole-betamethasone (LOTRISONE) 1-0.05 % cream Apply topically 2 times daily. 1 Tube 2    tacrolimus (PROGRAF) 1 MG capsule 4 tabs bid plus 0.5 mg bid for total of 4.5 mg bid 60 capsule 0    tacrolimus (PROGRAF) 0.5 MG capsule Take 1 capsule by mouth 2 times daily For total of 4.5 mg bid 60 capsule 0    mycophenolate (MYFORTIC) 180 MG DR tablet Take 360 mg by mouth 2 times daily 2 tablets twice daily       No current facility-administered medications on file prior to visit. Allergies as of 09/02/2021 - Fully Reviewed 09/02/2021   Allergen Reaction Noted    Cat hair extract  09/20/2019    Chloraprep one step [chlorhexidine gluconate]  10/21/2015    Dust mite extract  10/21/2015    Morphine  10/21/2015    Seasonal  09/20/2019           INDICATIONS:   1. LGSIL of cervix of undetermined significance    2.  Vulvar mass UHCG: HYST         HPV:   negative      Birth Control Method: hyst  Abnormal Cytology and Colposcopy History: LSIL    COLPOSCOPIC EXAMINATION:                Blood pressure 130/62, height 5' 9\" (1.753 m), weight 258 lb (117 kg), not currently breastfeeding. Chaperone for Intimate Exam  Chaperone was offered and accepted as part of the rooming process. Chaperone: Lawyer Juan observations: negative  Satisfactory: Yes   Unsatisfactory: na no cervix present        Unchanged with topical tx of lotrisone  Vulvoscopy completed with BX              LANDMARKS and ATYPICAL FINDINGS:  TZ = transformation zone   SC = new squamocolumnar junction  NC = Nabothian cyst    ME = immature squamous metaplasia  PO = polyp   AV = atypical vessels  C = condyloma  L = leukoplakia  AW = acetowhite epithelium   P = punctation  MO = mosaicism   LS = decreased Lugols uptake  X = biopsy sites  CA = invasive carcinoma      FINDINGS: Vag Cuff: The colposcope was utilized on high and low magnification. A plain white light and green filter were  also implemented after 3% of acetic acid was applied to the vaginal cuff. There was no Aceto White Epithelium, Mosaic Changes, Punctation, or Irregular Vessels seen. Vulva: Colposcope utilized with prep . The colposcope was utilized on high and low magnification. A plain white light and green filter were  also implemented after 3% of acetic acid was applied to the vulva. There was no Aceto White Epithelium, Mosaic Changes, Punctation, or Irregular Vessels seen. ECC performed:  Na-hyst    Lugols Iodine Applied:   Yes (no areas of poor uptake)       IMPRESSIONS: path pending  Biopsy sites: Labia Right inferior (Sterile prep and drape. 1% plain lidocaine. Tischler punch bx collected. Tolerated well. Single 3-0 vicryl on \"sh\"  for hemostasis. Restrictions reviewed. Any redness, temp, swelling pt to notify or ED reviewed.         Diagnostics:  EXAMINATION:   PELVIC ULTRASOUND       8/21/2021     TECHNIQUE:   Direct scanning in the vulvar region       COMPARISON:   None       HISTORY:   ORDERING SYSTEM PROVIDED HISTORY: Vulvar mass   TECHNOLOGIST PROVIDED HISTORY:   This procedure can be scheduled via [employer].  Access your [employer] account by   visiting [email or web address]. Right vulvar mass   Reason for Exam: labial mass   Acuity: Acute   Type of Exam: Initial       FINDINGS:   Direct scanning over an apparently palpable mass in the right labia majora   was performed.       Corresponding to the mass is a heterogeneous, solid-appearing lesion with   central color flow measuring approximately 12.6 x 4.7 x 26.4 mm.  Margins are   somewhat irregular and \"papillary\" appearing on the transverse images. Etiology is nonspecific.  Differential would include benign inflammatory   process versus malignancy.  Correlate with direct visualization and biopsy as   indicated.           Impression   Small, irregular, solid-appearing, superficial mass in the region of the   right labia majora.  Correlate with physical exam/direct visualization and   biopsy as clinically warranted.  Suggest pelvic MRI if additional imaging is   warranted clinically. Assessment:   Diagnosis Orders   1. LGSIL of cervix of undetermined significance  MO BIOPSY OF VAGINA,SIMPLE    Specimen to Pathology Outpatient    11851 - MO COLPOSCOPY,CERVIX W/ADJ VAGINA   2.  Vulvar mass  MO BIOPSY OF VAGINA,SIMPLE    Specimen to Pathology Outpatient    03292 - MO COLPOSCOPY,CERVIX W/ADJ VAGINA    doxycycline hyclate (VIBRA-TABS) 100 MG tablet         Patient Active Problem List    Diagnosis Date Noted    Vulvar mass Right 08/19/2021    Vaginal lesion 07/01/2021    Arthralgia of multiple joints 07/01/2021    Arthralgia of both knees 07/01/2021    Renal transplant recipient 11/11/2016    Allergic rhinitis 10/21/2015    Arm pain 10/21/2015    Arm swelling 10/21/2015    Carotid artery stenosis 10/21/2015    Depression 10/21/2015    Dysuria 10/21/2015    Fistula 10/21/2015    Headache 10/21/2015    History of kidney transplant 10/21/2015    Hyperlipidemia 10/21/2015    Hypertension 10/21/2015    Palpitations 10/21/2015    Post-nasal drip 10/21/2015    Acute cystitis 10/21/2015    Acute pancreatitis 10/21/2015    Edema 12/12/2014    Arteriovenous fistula ([facility] 12/12/2014    Hypertensive heart and kidney disease 11/03/2014    History of renal transplant 03/27/2014    Carotid artery occlusion 06/19/2013    Tachycardia 11/24/2010    Dysphagia 07/09/2010    Malaise and fatigue 07/09/2010    Autosomal dominant polycystic kidney disease 05/29/2010    End-stage renal disease ([facility] 05/29/2010    Sleep apnea 05/29/2010    Depressive disorder 05/29/2010    Abdominal pain 05/29/2010           PLAN:   1. The patient was given formal restriction guidelines. She is to RTO in 2 weeks. FOR PATIENTS WHO UNDERWENT A BIOPSY-They were instructed to report         any increase in vaginal bleeding, discharge, or any temperature more than      100.4   F. Strict pelvic rest precautions were reviewed with lifting restrictions. 2. If WNL Histopathology or Negative Colposcopic evaluation without Biopsies and/or ECC then Cytologic Collection/PAP in 6 months. 3. HPV Barrier Recommendations and STD counseling completed    4.  RTO 1 week    5. 6 month PAP      Ilya Escamilla DO

## 2021-09-07 LAB — SURGICAL PATHOLOGY REPORT: NORMAL

## 2021-09-09 ENCOUNTER — OFFICE VISIT (OUTPATIENT)
Dept: OBGYN CLINIC | Age: 51
End: 2021-09-09
Payer: COMMERCIAL

## 2021-09-09 VITALS
SYSTOLIC BLOOD PRESSURE: 126 MMHG | BODY MASS INDEX: 38.66 KG/M2 | HEIGHT: 69 IN | WEIGHT: 261 LBS | DIASTOLIC BLOOD PRESSURE: 80 MMHG

## 2021-09-09 DIAGNOSIS — N90.89 VULVAR MASS: Primary | ICD-10-CM

## 2021-09-09 DIAGNOSIS — D07.1 VULVAR INTRAEPITHELIAL NEOPLASIA (VIN) GRADE 3: ICD-10-CM

## 2021-09-09 DIAGNOSIS — D07.1 VULVAR INTRAEPITHELIAL NEOPLASIA (VIN) GRADE 3: Primary | ICD-10-CM

## 2021-09-09 DIAGNOSIS — N90.89 VULVAR MASS: ICD-10-CM

## 2021-09-09 PROCEDURE — 99213 OFFICE O/P EST LOW 20 MIN: CPT | Performed by: OBSTETRICS & GYNECOLOGY

## 2021-09-09 NOTE — PROGRESS NOTES
Daiana DURBIN Ela  2021      Avni Disla is a 46 y.o. female No obstetric history on file. The patient was seen today. She was here to follow-up regarding her labs and diagnostics ordered at her last visit for the diagnosis of:    ICD-10-CM    1. Vulvar intraepithelial neoplasia (DARRELL) grade 3  D07.1    2. Vulvar mass  N90.89         Her bowels are regular and she is voiding without difficulty. Pt states suture fell out. Aamiries Jack Zaldivar. I reviewed the pathology report and my recommendation for gyn onc. Appt made with Dr. Higinio Riddle. Past Medical History:   Diagnosis Date    Gout     Hemodialysis access site with arteriovenous graft ([facility] [address]     Polycystic kidney disease     bilateral    Restless legs syndrome          Past Surgical History:   Procedure Laterality Date    CHOLECYSTECTOMY      GALLBLADDER SURGERY      LAPAROSCOPIC    HERNIA REPAIR      MULIPTLE ABDOMINAL WALL FROM PERITONEAL DIALYSIS AND ABD SURGERIES    HYSTERECTOMY      PARTIAL- BOTH OVARIES STILL THERE    KIDNEY TRANSPLANT      MANDIBLE FRACTURE SURGERY      FOR BITE CORRECTION- NO TRAUMA    PARATHYROID GLAND SURGERY      REMOVED 1 GLAND    TOTAL NEPHRECTOMY Bilateral     POLYCYSTIC KIDNEY         Family History   Problem Relation Age of Onset    Kidney Disease Mother         polycystic kidney disease.      High Blood Pressure Father     Cancer Maternal Uncle         nonhodgkins lymphoma    Diabetes Maternal Grandmother     Other Maternal Grandfather         brain aneurysm         Social History     Tobacco Use    Smoking status: Former Smoker     Packs/day: 0.25     Years: 5.00     Pack years: 1.25     Types: Cigarettes     Quit date: 1994     Years since quittin.7    Smokeless tobacco: Never Used   Substance Use Topics    Alcohol use: Yes     Comment: SOCIAL    Drug use: No         MEDICATIONS:  Current Outpatient Medications   Medication Sig Dispense Refill    doxycycline hyclate (VIBRA-TABS) 100 MG tablet Take 1 tablet by mouth 2 times daily for 7 days 14 tablet 0    diclofenac sodium (VOLTAREN) 1 % GEL APPLY 4 G TOPICALLY 4 TIMES DAILY AS NEEDED FOR PAIN ON KNEES 100 g 0    clotrimazole-betamethasone (LOTRISONE) 1-0.05 % cream Apply topically 2 times daily. 1 Tube 2    tacrolimus (PROGRAF) 1 MG capsule 4 tabs bid plus 0.5 mg bid for total of 4.5 mg bid 60 capsule 0    tacrolimus (PROGRAF) 0.5 MG capsule Take 1 capsule by mouth 2 times daily For total of 4.5 mg bid 60 capsule 0    mycophenolate (MYFORTIC) 180 MG DR tablet Take 360 mg by mouth 2 times daily 2 tablets twice daily       No current facility-administered medications for this visit. ALLERGIES:  Allergies as of 09/09/2021 - Fully Reviewed 09/09/2021   Allergen Reaction Noted    Cat hair extract  09/20/2019    Chloraprep one step [chlorhexidine gluconate]  10/21/2015    Dust mite extract  10/21/2015    Morphine  10/21/2015    Seasonal  09/20/2019         Blood pressure 126/80, height 5' 9\" (1.753 m), weight 261 lb (118.4 kg), not currently breastfeeding. Abdomen: Soft non-tender; good bowel sounds. No guarding, rebound or rigidity. No CVA tenderness bilaterally. Extremities: No calf tenderness, DTR 2/4, and No edema bilaterally    Pelvic: Declined evaluation        Diagnostics:  US PELVIS LIMITED    Result Date: 8/23/2021  EXAMINATION: PELVIC ULTRASOUND 8/21/2021 TECHNIQUE: Direct scanning in the vulvar region COMPARISON: None HISTORY: ORDERING SYSTEM PROVIDED HISTORY: Vulvar mass TECHNOLOGIST PROVIDED HISTORY: This procedure can be scheduled via [employer]. Access your [employer] account by visiting [email or web address]. Right vulvar mass Reason for Exam: labial mass Acuity: Acute Type of Exam: Initial FINDINGS: Direct scanning over an apparently palpable mass in the right labia majora was performed.  Corresponding to the mass is a heterogeneous, solid-appearing lesion with central color flow measuring approximately 12.6 x 4.7 x 26.4 mm. Margins are somewhat irregular and \"papillary\" appearing on the transverse images. Etiology is nonspecific. Differential would include benign inflammatory process versus malignancy. Correlate with direct visualization and biopsy as indicated. Small, irregular, solid-appearing, superficial mass in the region of the right labia majora. Correlate with physical exam/direct visualization and biopsy as clinically warranted. Suggest pelvic MRI if additional imaging is warranted clinically. Lab Results:  Results for orders placed or performed during the hospital encounter of 09/02/21   Surgical Pathology   Result Value Ref Range    Surgical Pathology Report       -- Diagnosis --    Vulvar biopsy:    -  High-grade squamous intraepithelial lesion (DARRELL 3). JASMYN Nguyen  **Electronically Signed Out**         rdd/9/7/2021       Clinical Information  Pre-op Diagnosis:  VULVAR MASS/LESION    Operative Findings:  VULVAR BX    Source of Specimen  1: VULVAR BX    Gross Description  \"FABRICIO NICHOLS, VULVAR BX\" 0.4 x 0.3 x 0.2 cm tan fragment. Intact,  1cs. tm      Microscopic Description  Microscopic examination performed. SURGICAL PATHOLOGY CONSULTATION       Patient Name: Dileep Latham [facility] Rec: [id number]  Path Number: [id number]    [facility]  ANATOMIC PATHOLOGY  [address] [place] [address]. [facility], [address]  [phone]  Fax: [phone]             Assessment:   Diagnosis Orders   1. Vulvar intraepithelial neoplasia (DARRELL) grade 3     2.  Vulvar mass       Chief Complaint   Patient presents with    Follow-up         Patient Active Problem List    Diagnosis Date Noted    Vulvar intraepithelial neoplasia (DARRELL) grade 3 09/09/2021     Priority: High    Carotid artery stenosis 10/21/2015     Priority: High    History of kidney transplant 10/21/2015     Priority: High    History of renal transplant 03/27/2014     Priority: High    Carotid artery occlusion 06/19/2013     Priority: High    Fistula 10/21/2015     Priority: Medium    [facility] 12/12/2014     Priority: Medium    Autosomal dominant polycystic kidney disease 05/29/2010     Priority: Medium    End-stage renal disease ([facility] 05/29/2010     Priority: Medium    Vulvar mass Right 08/19/2021    Vaginal lesion 07/01/2021    Arthralgia of multiple joints 07/01/2021    Arthralgia of both knees 07/01/2021    Renal transplant recipient 11/11/2016    Allergic rhinitis 10/21/2015    Arm pain 10/21/2015    Arm swelling 10/21/2015    Depression 10/21/2015    Dysuria 10/21/2015    Headache 10/21/2015    Hyperlipidemia 10/21/2015    Hypertension 10/21/2015    Palpitations 10/21/2015    Post-nasal drip 10/21/2015    Acute cystitis 10/21/2015    Acute pancreatitis 10/21/2015    Edema 12/12/2014    Hypertensive heart and kidney disease 11/03/2014    Tachycardia 11/24/2010    Dysphagia 07/09/2010    Malaise and fatigue 07/09/2010    Sleep apnea 05/29/2010    Depressive disorder 05/29/2010    Abdominal pain 05/29/2010       PLAN:  Return in about 6 weeks (around 10/21/2021) for Follow Up Current Problem-Virtual Visit. Referral to GYN ONC  Return to the office in 6 weeks. Counseled on preventative health maintenance follow-up. No orders of the defined types were placed in this encounter. The patient, Miguel Stark is a 46 y.o. female, was seen with a total time spent of 20 minutes for the visit on this date of service by the E/M provider. The time component had both face to face and non face to face time spent in determining the total time component. Counseling and education regarding her diagnosis listed below and her options regarding those diagnoses were also included in determining her time component. Diagnosis Orders   1. Vulvar intraepithelial neoplasia (DARRELL) grade 3     2.  Vulvar mass          The patient had her preventative health maintenance recommendations and follow-up reviewed with her at the completion of her visit.

## 2021-09-16 ENCOUNTER — PREP FOR PROCEDURE (OUTPATIENT)
Dept: GYNECOLOGIC ONCOLOGY | Age: 51
End: 2021-09-16

## 2021-09-16 ENCOUNTER — OFFICE VISIT (OUTPATIENT)
Dept: GYNECOLOGIC ONCOLOGY | Age: 51
End: 2021-09-16
Payer: COMMERCIAL

## 2021-09-16 VITALS
SYSTOLIC BLOOD PRESSURE: 121 MMHG | OXYGEN SATURATION: 96 % | BODY MASS INDEX: 38.16 KG/M2 | WEIGHT: 257.6 LBS | DIASTOLIC BLOOD PRESSURE: 87 MMHG | HEIGHT: 69 IN | HEART RATE: 116 BPM | TEMPERATURE: 97.1 F

## 2021-09-16 DIAGNOSIS — D07.1 VULVAR INTRAEPITHELIAL NEOPLASIA (VIN) GRADE 3: Primary | ICD-10-CM

## 2021-09-16 DIAGNOSIS — Z01.818 PRE-OP EVALUATION: Primary | ICD-10-CM

## 2021-09-16 PROCEDURE — 99204 OFFICE O/P NEW MOD 45 MIN: CPT | Performed by: OBSTETRICS & GYNECOLOGY

## 2021-09-16 RX ORDER — SODIUM CHLORIDE 0.9 % (FLUSH) 0.9 %
10 SYRINGE (ML) INJECTION EVERY 12 HOURS SCHEDULED
Status: CANCELLED | OUTPATIENT
Start: 2021-09-16

## 2021-09-16 RX ORDER — SODIUM CHLORIDE 9 MG/ML
INJECTION, SOLUTION INTRAVENOUS CONTINUOUS
Status: CANCELLED | OUTPATIENT
Start: 2021-09-16

## 2021-09-16 RX ORDER — SODIUM CHLORIDE 9 MG/ML
25 INJECTION, SOLUTION INTRAVENOUS PRN
Status: CANCELLED | OUTPATIENT
Start: 2021-09-16

## 2021-09-16 RX ORDER — SODIUM CHLORIDE 0.9 % (FLUSH) 0.9 %
10 SYRINGE (ML) INJECTION PRN
Status: CANCELLED | OUTPATIENT
Start: 2021-09-16

## 2021-09-16 ASSESSMENT — ENCOUNTER SYMPTOMS
EYES NEGATIVE: 1
RESPIRATORY NEGATIVE: 1
GASTROINTESTINAL NEGATIVE: 1

## 2021-09-16 NOTE — PROGRESS NOTES
[address] [facility], [facility] [address], [address] [facility] [address]      I am seeing Armani Olivas for New Patient at the request of her Mary Anne Mancilla, OB/GYN, [facility]. .    Chief Complaint: Vulvar dysplasia. Vulvar pain    HPI  She is a 46 y.o.  3, para 3 (all vaginal deliveries) female who is being referred for recent biopsy revealing DARRELL 3. The patient states that she noted a vulvar roughening and irritation on the right side over a year ago. Her PCP noted the area and prescribed Neosporin. The patient felt the lesion improved. In 2021 she developed an irritation on the right side again along with increasing pain. She was placed on Cipro and the pain improved but did not go away. She was then referred to Dr. Mary Anne Mancilla, OB/GYN. Dr. Bridgett Frankel performed colposcopy and a biopsy that revealed DARRELL3. In addition he performed pelvic ultrasound of the area that suggested a \"solid-appearing lesion with a central color flow measuring 1.26 x 0.47 x 2.64 cm\". \"Differential would include benign inflammatory process versus malignancy\". The patient was placed on another antibiotic and improvement was noted. Because of the DARRELL 3 the patient has been referred here to [facility] gynecologic oncology for further evaluation and definitive therapy. Today the patient states that she is having only minimal discomfort. She has no other complaints. Her past history is significant that she has had a kidney transplant 9 years ago and is on Prograf along with a number of other immunosuppressant medications. She sees a nephrologist and urologist on a regular basis. The patient did smoke 30 years ago but has not smoked since. She denies any cardiac or pulmonary difficulties. Review of Systems  I have personally reviewed and agree with the review of systems done by my ancillary staff in the [facility] documentation.     OBJECTIVE:  Vitals:    21 7175 BP: 121/87   Site: Right Upper Arm   Position: Sitting   Cuff Size: Large Adult   Pulse: 116   Temp: 97.1 °F (36.2 °C)   SpO2: 96%   Weight: 257 lb 9.6 oz (116.8 kg)   Height: 5' 9\" (1.753 m)       General: Alert and oriented x3, no acute distress    HEENT:  EOM intact, DEVANTE, no cervical or supraclavicular lymphadenopathy. No Thyromegaly. Heart: Auscultation of heart revels a regular rate with no murmur, gallops, or rubs. Lungs:  Clear bilaterally to auscultation to the bases. CVA: No tenderness. Abdomen: The patient has multiple vertical abdominal incisions from her previous bilateral nephrectomies as well as peritoneal port placements on several occasions for her previous peritoneal dialysis treatments. She has also had a number of \"herniations\" that were repaired. No herniations noted today. No rashes. No splenomegaly. No hepatomegaly. No masses. No tenderness. No inguinal adenopathy is noted on either side. Lower Extremities:  No lymphedema, no calf tenderness, no musculoskeletal deformities. Pelvic Exam: Patient has a white lesion in the mid and right lower labium mages and minus. There is some surrounding hypertrophy as well. The lesion does extend to the midline posteriorly near the extreme lower vagina and upper perineum. It does not appear to involve the anus. Biopsy site noted posteriorly. Slightly tender in this area. There was no evidence or suspicion of invasion. There were no ulcerations or raised lesions. At this point careful palpation and visualization was performed of the urethra periurethral areas Bartholin's glands bilaterally perineum and the entire labia majora and minora. There were no solid or firm lesions detected by palpation there were no tender areas. It appears that the lesion noted on ultrasound has resolved with antibiotics. Family History   Problem Relation Age of Onset    Kidney Disease Mother         polycystic kidney disease.  High Blood Pressure Father     Cancer Maternal Uncle         nonhodgkins lymphoma    Diabetes Maternal Grandmother     Other Maternal Grandfather         brain aneurysm       Assessment:  1. Vulvar intraepithelial neoplasia (DARRELL) grade 3      Renal transplant currently on Prograft and antirejection, immunosuppressant medications. Discussion: Discussion was held with the patient concerning options. I do feel because this is localized to the right lower labium mages and minus that an excision should be performed with reconstruction. There is a small possibility that a microinvasive area could be found. We would want to remove the lesion with a negative margin at least 1/2 to 1 cm just in case. Because the lesion does involve the upper perineum near the anus I would also like to have the ultrasonic scalpel available so that we can perform colposcopy and eradicate any visible lesion with a wide margin. I explained that her healing will be impaired because of her immunosuppressants and that infection is a possibility. Dehiscence is a possibility. We reviewed the antiseptic rinses with blow drying etc. etc.  We will be Following her carefully during the postop. Plan: Vulvar colposcopy with microscope. right hemivulvectomy. Ultrasonic scalpel partial vulvectomy treatment     Follow Up: Postoperatively      INFORMED CONSENT:  We discussed options including but not limited to observation, surgical excision, CO2 laser and ultrasonic scalpel treatments along with colposcopy. The patient agrees to a combination of surgical excision and ultrasonic scalpel treatment. See above. .  Benefits of the procedure(s) include but not limited to treatment, possible staging of precancerous/cancerous lesions and/or improvement in systems and quality of life.   The procedure(s) were explained in great detail along with all the possible risks and complications including, but not limited to blood loss requiring transfusions, DVT requiring blood thinning agents, injury to surrounding structures including bladder, bowel ureters, etc., as well as the possibility of infection requiring prophylactic antibiotics. I spent 50 minutes providing care to the patient and >50% of the time was spent counseling and conoordinating care, discussing the patient's current situation, reviewing her options, counseling and education her and answering her questions. All of the patient's pertinent images, labs and previous records and procedures were reviewed.     Electronically signed by Marcela Whitman MD on 9/16/21 at 9:45 AM EDT

## 2021-09-16 NOTE — PATIENT INSTRUCTIONS
[address] [facility], [facility], [address] [address]    Dulcolax Bowel Preparation for Surgery    A few days before surgery purchase Dulcolax Laxative Tablets (generic name is Bisacodyl Tablets)    Be sure you purchase the Ducolax (Bisacodyl) LAXATIVE tablets (not stimulant tablets)    2 Days before Surgery:  · You may take your regular mediations, unless otherwise directed. · In the morning, take 4 Dulcolax laxative tablets. · Then drink clear liquids. Drink as much clear liquids as you can; this will prevent dehydration. · Do not eat any solid foods. · Continue to drink at least 8 ounces of clear liquid every 1-2 hours until bedtime. Day of Surgery:  · You may take your regular medications, unless otherwise directed. · You may continue to drink clear liquids up to 4 hours prior to surgery. Clear Liquid Diet:  · Bouillon (Chicken or beef; no meat or noodles), coffee and tea (you may use sugar/sweetener), gelatin, popsicles, water, all fruit juices (no pulp), all sports drinks (Gatorade, Power Kourtney, Lemonade, or Lime kourtney (no pulp), sherbet, clear soft drinks. NOTICE TO DIABETIC PATIENTS:  Make sure our office is aware that you are diabetic. Contact your primary care physician or endocrinologist for instructions on how to use your insulin or diabetic medications.

## 2021-09-16 NOTE — PROGRESS NOTES
Review of Systems   Constitutional: Negative. HENT:  Negative. Eyes: Negative. Respiratory: Negative. Cardiovascular: Negative. Gastrointestinal: Negative. Endocrine: Negative. Genitourinary: Positive for pelvic pain. Musculoskeletal: Negative. Skin: Positive for wound. Neurological: Negative. Hematological: Bruises/bleeds easily.

## 2021-09-18 ENCOUNTER — HOSPITAL ENCOUNTER (OUTPATIENT)
Age: 51
Discharge: HOME | End: 2021-09-18
Payer: COMMERCIAL

## 2021-09-18 LAB
ABSOLUTE EOS #: 0.3 K/UL (ref 0–0.4)
ABSOLUTE IMMATURE GRANULOCYTE: ABNORMAL K/UL (ref 0–0.3)
ABSOLUTE LYMPH #: 1.5 K/UL (ref 1–4.8)
ABSOLUTE MONO #: 0.3 K/UL (ref 0.1–1.3)
ALBUMIN SERPL-MCNC: 4 G/DL (ref 3.5–5.2)
ALBUMIN/GLOBULIN RATIO: ABNORMAL (ref 1–2.5)
ALP BLD-CCNC: 72 U/L (ref 35–104)
ALT SERPL-CCNC: 18 U/L (ref 5–33)
ANION GAP SERPL CALCULATED.3IONS-SCNC: 11 MMOL/L (ref 9–17)
AST SERPL-CCNC: 16 U/L
BASOPHILS # BLD: 1 % (ref 0–2)
BASOPHILS ABSOLUTE: 0 K/UL (ref 0–0.2)
BILIRUB SERPL-MCNC: 0.49 MG/DL (ref 0.3–1.2)
BUN BLDV-MCNC: 12 MG/DL (ref 6–20)
BUN/CREAT BLD: ABNORMAL (ref 9–20)
CALCIUM SERPL-MCNC: 9 MG/DL (ref 8.6–10.4)
CHLORIDE BLD-SCNC: 104 MMOL/L (ref 98–107)
CHOLESTEROL/HDL RATIO: 4.7
CHOLESTEROL: 208 MG/DL
CO2: 20 MMOL/L (ref 20–31)
CREAT SERPL-MCNC: 0.99 MG/DL (ref 0.5–0.9)
DIFFERENTIAL TYPE: ABNORMAL
EOSINOPHILS RELATIVE PERCENT: 5 % (ref 0–4)
GFR AFRICAN AMERICAN: >60 ML/MIN
GFR NON-AFRICAN AMERICAN: 59 ML/MIN
GFR SERPL CREATININE-BSD FRML MDRD: ABNORMAL ML/MIN/{1.73_M2}
GFR SERPL CREATININE-BSD FRML MDRD: ABNORMAL ML/MIN/{1.73_M2}
GLUCOSE BLD-MCNC: 112 MG/DL (ref 70–99)
HCT VFR BLD CALC: 39.9 % (ref 36–46)
HDLC SERPL-MCNC: 44 MG/DL
HEMOGLOBIN: 13.2 G/DL (ref 12–16)
IMMATURE GRANULOCYTES: ABNORMAL %
LDL CHOLESTEROL: 138 MG/DL (ref 0–130)
LYMPHOCYTES # BLD: 30 % (ref 24–44)
MAGNESIUM: 1.6 MG/DL (ref 1.6–2.6)
MCH RBC QN AUTO: 29.7 PG (ref 26–34)
MCHC RBC AUTO-ENTMCNC: 33.1 G/DL (ref 31–37)
MCV RBC AUTO: 89.9 FL (ref 80–100)
MONOCYTES # BLD: 6 % (ref 1–7)
NRBC AUTOMATED: ABNORMAL PER 100 WBC
PDW BLD-RTO: 13.8 % (ref 11.5–14.9)
PHOSPHORUS: 2.5 MG/DL (ref 2.6–4.5)
PLATELET # BLD: 199 K/UL (ref 150–450)
PLATELET ESTIMATE: ABNORMAL
PMV BLD AUTO: 7.9 FL (ref 6–12)
POTASSIUM SERPL-SCNC: 4.5 MMOL/L (ref 3.7–5.3)
RBC # BLD: 4.44 M/UL (ref 4–5.2)
RBC # BLD: ABNORMAL 10*6/UL
SEG NEUTROPHILS: 58 % (ref 36–66)
SEGMENTED NEUTROPHILS ABSOLUTE COUNT: 3.1 K/UL (ref 1.3–9.1)
SODIUM BLD-SCNC: 135 MMOL/L (ref 135–144)
TOTAL PROTEIN: 7.4 G/DL (ref 6.4–8.3)
TRIGL SERPL-MCNC: 132 MG/DL
URIC ACID: 7 MG/DL (ref 2.4–5.7)
VLDLC SERPL CALC-MCNC: ABNORMAL MG/DL (ref 1–30)
WBC # BLD: 5.3 K/UL (ref 3.5–11)
WBC # BLD: ABNORMAL 10*3/UL

## 2021-09-18 PROCEDURE — 80061 LIPID PANEL: CPT

## 2021-09-18 PROCEDURE — 83735 ASSAY OF MAGNESIUM: CPT

## 2021-09-18 PROCEDURE — 84100 ASSAY OF PHOSPHORUS: CPT

## 2021-09-18 PROCEDURE — 85025 COMPLETE CBC W/AUTO DIFF WBC: CPT

## 2021-09-18 PROCEDURE — 80053 COMPREHEN METABOLIC PANEL: CPT

## 2021-09-18 PROCEDURE — 80197 ASSAY OF TACROLIMUS: CPT

## 2021-09-18 PROCEDURE — 36415 COLL VENOUS BLD VENIPUNCTURE: CPT

## 2021-09-18 PROCEDURE — 84550 ASSAY OF BLOOD/URIC ACID: CPT

## 2021-09-21 LAB — PROGRAF: 5.3 NG/ML

## 2021-09-23 RX ORDER — SODIUM CHLORIDE, SODIUM LACTATE, POTASSIUM CHLORIDE, CALCIUM CHLORIDE 600; 310; 30; 20 MG/100ML; MG/100ML; MG/100ML; MG/100ML
1000 INJECTION, SOLUTION INTRAVENOUS CONTINUOUS
Status: CANCELLED | OUTPATIENT
Start: 2021-09-23

## 2021-09-27 ENCOUNTER — HOSPITAL ENCOUNTER (OUTPATIENT)
Dept: GENERAL RADIOLOGY | Age: 51
Discharge: HOME | End: 2021-09-29
Payer: COMMERCIAL

## 2021-09-27 ENCOUNTER — HOSPITAL ENCOUNTER (OUTPATIENT)
Dept: PREADMISSION TESTING | Age: 51
Discharge: HOME | End: 2021-10-01
Payer: COMMERCIAL

## 2021-09-27 VITALS
RESPIRATION RATE: 18 BRPM | HEIGHT: 69 IN | SYSTOLIC BLOOD PRESSURE: 137 MMHG | TEMPERATURE: 99.1 F | BODY MASS INDEX: 38.66 KG/M2 | HEART RATE: 97 BPM | WEIGHT: 261 LBS | OXYGEN SATURATION: 96 % | DIASTOLIC BLOOD PRESSURE: 96 MMHG

## 2021-09-27 DIAGNOSIS — N76.0 VULVOVAGINITIS: ICD-10-CM

## 2021-09-27 DIAGNOSIS — Z01.818 PRE-OP EVALUATION: ICD-10-CM

## 2021-09-27 DIAGNOSIS — N39.41 URGENCY INCONTINENCE: ICD-10-CM

## 2021-09-27 LAB
ABSOLUTE EOS #: 0.3 K/UL (ref 0–0.44)
ABSOLUTE IMMATURE GRANULOCYTE: 0.03 K/UL (ref 0–0.3)
ABSOLUTE LYMPH #: 1.88 K/UL (ref 1.1–3.7)
ABSOLUTE MONO #: 0.47 K/UL (ref 0.1–1.2)
ANION GAP SERPL CALCULATED.3IONS-SCNC: 12 MMOL/L (ref 9–17)
BASOPHILS # BLD: 1 % (ref 0–2)
BASOPHILS ABSOLUTE: 0.05 K/UL (ref 0–0.2)
BILIRUBIN URINE: NEGATIVE
BUN BLDV-MCNC: 12 MG/DL (ref 6–20)
BUN/CREAT BLD: ABNORMAL (ref 9–20)
CALCIUM SERPL-MCNC: 9.2 MG/DL (ref 8.6–10.4)
CHLORIDE BLD-SCNC: 104 MMOL/L (ref 98–107)
CO2: 22 MMOL/L (ref 20–31)
COLOR: YELLOW
COMMENT UA: NORMAL
CREAT SERPL-MCNC: 0.84 MG/DL (ref 0.5–0.9)
DIFFERENTIAL TYPE: NORMAL
EOSINOPHILS RELATIVE PERCENT: 4 % (ref 1–4)
GFR AFRICAN AMERICAN: >60 ML/MIN
GFR NON-AFRICAN AMERICAN: >60 ML/MIN
GFR SERPL CREATININE-BSD FRML MDRD: ABNORMAL ML/MIN/{1.73_M2}
GFR SERPL CREATININE-BSD FRML MDRD: ABNORMAL ML/MIN/{1.73_M2}
GLUCOSE BLD-MCNC: 110 MG/DL (ref 70–99)
GLUCOSE URINE: NEGATIVE
HCT VFR BLD CALC: 40 % (ref 36.3–47.1)
HEMOGLOBIN: 12.9 G/DL (ref 11.9–15.1)
IMMATURE GRANULOCYTES: 0 %
KETONES, URINE: NEGATIVE
LEUKOCYTE ESTERASE, URINE: NEGATIVE
LYMPHOCYTES # BLD: 26 % (ref 24–43)
MCH RBC QN AUTO: 29.3 PG (ref 25.2–33.5)
MCHC RBC AUTO-ENTMCNC: 32.3 G/DL (ref 28.4–34.8)
MCV RBC AUTO: 90.9 FL (ref 82.6–102.9)
MONOCYTES # BLD: 7 % (ref 3–12)
NITRITE, URINE: NEGATIVE
NRBC AUTOMATED: 0 PER 100 WBC
PDW BLD-RTO: 13 % (ref 11.8–14.4)
PH UA: 6 (ref 5–8)
PLATELET # BLD: 209 K/UL (ref 138–453)
PLATELET ESTIMATE: NORMAL
PMV BLD AUTO: 10.1 FL (ref 8.1–13.5)
POTASSIUM SERPL-SCNC: 4.8 MMOL/L (ref 3.7–5.3)
PROTEIN UA: NEGATIVE
RBC # BLD: 4.4 M/UL (ref 3.95–5.11)
RBC # BLD: NORMAL 10*6/UL
SEG NEUTROPHILS: 62 % (ref 36–65)
SEGMENTED NEUTROPHILS ABSOLUTE COUNT: 4.44 K/UL (ref 1.5–8.1)
SODIUM BLD-SCNC: 138 MMOL/L (ref 135–144)
SPECIFIC GRAVITY UA: 1.01 (ref 1–1.03)
TURBIDITY: CLEAR
URINE HGB: NEGATIVE
UROBILINOGEN, URINE: NORMAL
WBC # BLD: 7.2 K/UL (ref 3.5–11.3)
WBC # BLD: NORMAL 10*3/UL

## 2021-09-27 PROCEDURE — 36415 COLL VENOUS BLD VENIPUNCTURE: CPT

## 2021-09-27 PROCEDURE — 80048 BASIC METABOLIC PNL TOTAL CA: CPT

## 2021-09-27 PROCEDURE — 71046 X-RAY EXAM CHEST 2 VIEWS: CPT

## 2021-09-27 PROCEDURE — 93005 ELECTROCARDIOGRAM TRACING: CPT

## 2021-09-27 PROCEDURE — 85025 COMPLETE CBC W/AUTO DIFF WBC: CPT

## 2021-09-27 PROCEDURE — 81003 URINALYSIS AUTO W/O SCOPE: CPT

## 2021-09-27 ASSESSMENT — PAIN SCALES - GENERAL: PAINLEVEL_OUTOF10: 4

## 2021-09-27 ASSESSMENT — PAIN DESCRIPTION - PAIN TYPE: TYPE: CHRONIC PAIN

## 2021-09-27 ASSESSMENT — PAIN DESCRIPTION - ORIENTATION: ORIENTATION: RIGHT;LEFT

## 2021-09-27 ASSESSMENT — PAIN DESCRIPTION - LOCATION: LOCATION: KNEE

## 2021-09-28 LAB
EKG ATRIAL RATE: 79 BPM
EKG P AXIS: 67 DEGREES
EKG P-R INTERVAL: 190 MS
EKG Q-T INTERVAL: 406 MS
EKG QRS DURATION: 88 MS
EKG QTC CALCULATION (BAZETT): 465 MS
EKG R AXIS: 30 DEGREES
EKG T AXIS: 40 DEGREES
EKG VENTRICULAR RATE: 79 BPM

## 2021-10-01 ENCOUNTER — OFFICE VISIT (OUTPATIENT)
Dept: PRIMARY CARE CLINIC | Age: 51
End: 2021-10-01
Payer: COMMERCIAL

## 2021-10-01 ENCOUNTER — HOSPITAL ENCOUNTER (OUTPATIENT)
Dept: LAB | Age: 51
Setting detail: SPECIMEN
Discharge: HOME | End: 2021-10-01
Payer: COMMERCIAL

## 2021-10-01 VITALS
HEART RATE: 103 BPM | BODY MASS INDEX: 37.8 KG/M2 | DIASTOLIC BLOOD PRESSURE: 84 MMHG | WEIGHT: 256 LBS | OXYGEN SATURATION: 98 % | SYSTOLIC BLOOD PRESSURE: 122 MMHG

## 2021-10-01 DIAGNOSIS — I10 HYPERTENSION, UNSPECIFIED TYPE: ICD-10-CM

## 2021-10-01 DIAGNOSIS — Z94.0 RENAL TRANSPLANT RECIPIENT: ICD-10-CM

## 2021-10-01 DIAGNOSIS — Z01.818 PRE-OP TESTING: Primary | ICD-10-CM

## 2021-10-01 DIAGNOSIS — D07.1 VULVAR INTRAEPITHELIAL NEOPLASIA (VIN) GRADE 3: Primary | ICD-10-CM

## 2021-10-01 PROBLEM — N90.89 VULVAR MASS: Status: RESOLVED | Noted: 2021-08-19 | Resolved: 2021-10-01

## 2021-10-01 PROBLEM — N89.8 VAGINAL LESION: Status: RESOLVED | Noted: 2021-07-01 | Resolved: 2021-10-01

## 2021-10-01 PROCEDURE — 99213 OFFICE O/P EST LOW 20 MIN: CPT | Performed by: FAMILY MEDICINE

## 2021-10-01 PROCEDURE — U0005 INFEC AGEN DETEC AMPLI PROBE: HCPCS

## 2021-10-01 PROCEDURE — U0003 INFECTIOUS AGENT DETECTION BY NUCLEIC ACID (DNA OR RNA); SEVERE ACUTE RESPIRATORY SYNDROME CORONAVIRUS 2 (SARS-COV-2) (CORONAVIRUS DISEASE [COVID-19]), AMPLIFIED PROBE TECHNIQUE, MAKING USE OF HIGH THROUGHPUT TECHNOLOGIES AS DESCRIBED BY CMS-2020-01-R: HCPCS

## 2021-10-01 ASSESSMENT — ENCOUNTER SYMPTOMS
SORE THROAT: 0
DIARRHEA: 0
VOMITING: 0
EYE REDNESS: 0
ABDOMINAL PAIN: 0
EYE DISCHARGE: 0
RHINORRHEA: 0
SHORTNESS OF BREATH: 0
WHEEZING: 0
COUGH: 0
NAUSEA: 0
ANAL BLEEDING: 0

## 2021-10-01 NOTE — PROGRESS NOTES
[facility] PRIMARY CARE  [address] [address]  [address] [address]  Dept: [address] Tayler Maher is a 46 y.o. female Established patient, who presents today for her medical conditions/complaints as noted below. Chief Complaint   Patient presents with    Other     Pt here for surgical clearance        HPI:     HPI- surgery is 10-5. Here for clearance. Has hemorrhoid right now, otherwise doing okay. Does c/o pain in her knees, left especially. Used voltaren gel and heat- that helps some. Reviewed prior notes gyn  Reviewed previous Labs, Imaging and Hospital Records    LDL Cholesterol (mg/dL)   Date Value   09/18/2021 138 (H)   05/01/2021 143 (H)   03/20/2021 143 (H)       (goal LDL is <100)   AST (U/L)   Date Value   09/18/2021 16     ALT (U/L)   Date Value   09/18/2021 18     BUN (mg/dL)   Date Value   09/27/2021 12     Hemoglobin A1C (%)   Date Value   10/17/2020 5.8     BP Readings from Last 3 Encounters:   10/01/21 122/84   09/27/21 (!) 137/96   09/16/21 121/87          (goal 120/80)    Past Medical History:   Diagnosis Date    Abdominal pain 5/29/2010    Acute cystitis 10/21/2015    Acute pancreatitis 10/21/2015    Allergic rhinitis 10/21/2015    Arm pain 10/21/2015    Arm swelling 10/21/2015    Arteriovenous fistula (HCC) 12/12/2014    Arthritis     bilateral knees    Depressive disorder 5/29/2010    Dysphagia 7/9/2010    Dysuria 10/21/2015    Edema 12/12/2014    End-stage renal disease ([address] 5/29/2010    Fistula 10/21/2015    Gout     Hemodialysis access site with arteriovenous graft [facility]     Hemodialysis patient ([facility]     [id number].  History of kidney transplant 10/21/2015    History of renal transplant 3/27/2014   Diannia Hernan and fatigue 7/9/2010    Palpitations 10/21/2015    Polycystic kidney disease     bilateral. kidney transplant. No longer sees Nephrology.  Sees Urologist @ [facility]    Post-nasal drip 10/21/2015    Restless legs syndrome     Tachycardia 2010    Vaginal lesion 2021    Vulvar carcinoma ([address]     Vulvar edema     Vulvar mass Right 2021      Past Surgical History:   Procedure Laterality Date    AV FISTULA CREATION      AV FISTULA REPAIR      took fistula down/ reversed    GALLBLADDER SURGERY      LAPAROSCOPIC    HERNIA REPAIR      MULIPTLE ABDOMINAL WALL FROM PERITONEAL DIALYSIS AND ABD SURGERIES    HYSTERECTOMY      PARTIAL- BOTH OVARIES STILL THERE    KIDNEY TRANSPLANT      [facility]    MANDIBLE FRACTURE SURGERY      FOR BITE CORRECTION- NO TRAUMA    PARATHYROID GLAND SURGERY  2006    REMOVED 1 GLAND    TOTAL NEPHRECTOMY Bilateral     POLYCYSTIC KIDNEY       Family History   Problem Relation Age of Onset    Kidney Disease Mother         polycystic kidney disease.  Heart Disease Mother     High Blood Pressure Father     Diabetes Maternal Grandmother     Other Maternal Grandfather         brain aneurysm    Cancer Maternal Uncle         nonhodgkins lymphoma    Kidney Disease Other         multiple aunts and uncles with polycystic kidney       Social History     Tobacco Use    Smoking status: Former Smoker     Packs/day: 0.25     Years: 5.00     Pack years: 1.25     Types: Cigarettes     Quit date: 1991     Years since quittin.7    Smokeless tobacco: Never Used   Substance Use Topics    Alcohol use: Yes     Comment: very seldom.  few times a year      Current Outpatient Medications   Medication Sig Dispense Refill    diclofenac sodium (VOLTAREN) 1 % GEL APPLY 4 G TOPICALLY 4 TIMES DAILY AS NEEDED FOR PAIN ON KNEES 100 g 3    tacrolimus (PROGRAF) 1 MG capsule 4 tabs bid plus 0.5 mg bid for total of 4.5 mg bid 60 capsule 0    tacrolimus (PROGRAF) 0.5 MG capsule Take 1 capsule by mouth 2 times daily For total of 4.5 mg bid 60 capsule 0    mycophenolate (MYFORTIC) 180 MG DR tablet Take 360 mg by mouth 2 times daily 2 tablets twice daily       No current facility-administered medications for this visit. Allergies   Allergen Reactions    Chloraprep One Step [Chlorhexidine Gluconate] Itching    Morphine Hives     Itching and hives    Seasonal Itching    Cat Hair Extract Itching    Dust Mite Extract Itching       Health Maintenance   Topic Date Due    Hepatitis C screen  Never done    COVID-19 Vaccine (1) Never done    HIV screen  Never done    Colon cancer screen colonoscopy  Never done    Shingles Vaccine (1 of 2) Never done    Flu vaccine (1) 09/01/2021    A1C test (Diabetic or Prediabetic)  10/17/2021    Breast cancer screen  04/21/2023    Lipid screen  09/18/2026    DTaP/Tdap/Td vaccine (2 - Td or Tdap) 08/24/2030    Pneumococcal 0-64 years Vaccine (4 of 4 - PPSV23) 08/18/2033    Hepatitis A vaccine  Aged Out    Hepatitis B vaccine  Aged Out    Hib vaccine  Aged Out    Meningococcal (ACWY) vaccine  Aged Out       Subjective:      Review of Systems   Constitutional: Negative for chills and fever. HENT: Negative for congestion, rhinorrhea and sore throat. Eyes: Negative for discharge and redness. Respiratory: Negative for cough, shortness of breath and wheezing. Cardiovascular: Negative for chest pain and palpitations. Gastrointestinal: Negative for abdominal pain, anal bleeding, diarrhea, nausea and vomiting. Genitourinary: Negative for dysuria and frequency. Musculoskeletal: Negative for arthralgias and myalgias. Neurological: Negative for dizziness, light-headedness and headaches. Psychiatric/Behavioral: Negative for sleep disturbance. Objective:     /84   Pulse 103   Wt 256 lb (116.1 kg)   SpO2 98%   BMI 37.80 kg/m²   Physical Exam  Vitals and nursing note reviewed. Constitutional:       General: She is not in acute distress. Appearance: She is well-developed. She is not ill-appearing. HENT:      Head: Normocephalic and atraumatic.       Right Ear: External ear normal.      Left Ear: External ear medications. All patient questions answered. Pt voiced understanding. Reviewed health maintenance. Instructed to continue current medications, diet and exercise. Patient agreed with treatment plan. Follow up as directed.      Electronically signed by Giovany Shelton MD on 10/1/2021 at 8:24 AM

## 2021-10-02 LAB
SARS-COV-2: NORMAL
SARS-COV-2: NOT DETECTED
SOURCE: NORMAL

## 2021-10-04 ENCOUNTER — ANESTHESIA EVENT (OUTPATIENT)
Dept: OPERATING ROOM | Age: 51
End: 2021-10-04
Payer: COMMERCIAL

## 2021-10-05 ENCOUNTER — HOSPITAL ENCOUNTER (OUTPATIENT)
Age: 51
Setting detail: OUTPATIENT SURGERY
Discharge: HOME | End: 2021-10-05
Attending: OBSTETRICS & GYNECOLOGY | Admitting: OBSTETRICS & GYNECOLOGY
Payer: COMMERCIAL

## 2021-10-05 ENCOUNTER — ANESTHESIA (OUTPATIENT)
Dept: OPERATING ROOM | Age: 51
End: 2021-10-05
Payer: COMMERCIAL

## 2021-10-05 VITALS
DIASTOLIC BLOOD PRESSURE: 87 MMHG | RESPIRATION RATE: 16 BRPM | WEIGHT: 253 LBS | HEIGHT: 69 IN | OXYGEN SATURATION: 97 % | BODY MASS INDEX: 37.47 KG/M2 | SYSTOLIC BLOOD PRESSURE: 122 MMHG | TEMPERATURE: 97.9 F | HEART RATE: 67 BPM

## 2021-10-05 VITALS — OXYGEN SATURATION: 99 % | DIASTOLIC BLOOD PRESSURE: 90 MMHG | SYSTOLIC BLOOD PRESSURE: 145 MMHG | TEMPERATURE: 93.4 F

## 2021-10-05 PROBLEM — Z90.79 HISTORY OF VULVECTOMY: Status: ACTIVE | Noted: 2021-10-05

## 2021-10-05 PROCEDURE — 2500000003 HC RX 250 WO HCPCS: Performed by: NURSE ANESTHETIST, CERTIFIED REGISTERED

## 2021-10-05 PROCEDURE — 2500000003 HC RX 250 WO HCPCS: Performed by: OBSTETRICS & GYNECOLOGY

## 2021-10-05 PROCEDURE — 3600000015 HC SURGERY LEVEL 5 ADDTL 15MIN: Performed by: OBSTETRICS & GYNECOLOGY

## 2021-10-05 PROCEDURE — 56620 VULVECTOMY SIMPLE PARTIAL: CPT | Performed by: OBSTETRICS & GYNECOLOGY

## 2021-10-05 PROCEDURE — 3700000001 HC ADD 15 MINUTES (ANESTHESIA): Performed by: OBSTETRICS & GYNECOLOGY

## 2021-10-05 PROCEDURE — 6370000000 HC RX 637 (ALT 250 FOR IP): Performed by: OBSTETRICS & GYNECOLOGY

## 2021-10-05 PROCEDURE — 3700000000 HC ANESTHESIA ATTENDED CARE: Performed by: OBSTETRICS & GYNECOLOGY

## 2021-10-05 PROCEDURE — 3600000005 HC SURGERY LEVEL 5 BASE: Performed by: OBSTETRICS & GYNECOLOGY

## 2021-10-05 PROCEDURE — 6360000002 HC RX W HCPCS

## 2021-10-05 PROCEDURE — 2709999900 HC NON-CHARGEABLE SUPPLY: Performed by: OBSTETRICS & GYNECOLOGY

## 2021-10-05 PROCEDURE — 2580000003 HC RX 258: Performed by: ANESTHESIOLOGY

## 2021-10-05 PROCEDURE — 88309 TISSUE EXAM BY PATHOLOGIST: CPT

## 2021-10-05 PROCEDURE — 2580000003 HC RX 258: Performed by: OBSTETRICS & GYNECOLOGY

## 2021-10-05 PROCEDURE — 6360000002 HC RX W HCPCS: Performed by: NURSE ANESTHETIST, CERTIFIED REGISTERED

## 2021-10-05 PROCEDURE — 7100000040 HC SPAR PHASE II RECOVERY - FIRST 15 MIN: Performed by: OBSTETRICS & GYNECOLOGY

## 2021-10-05 PROCEDURE — 7100000041 HC SPAR PHASE II RECOVERY - ADDTL 15 MIN: Performed by: OBSTETRICS & GYNECOLOGY

## 2021-10-05 PROCEDURE — 2580000003 HC RX 258: Performed by: NURSE ANESTHETIST, CERTIFIED REGISTERED

## 2021-10-05 PROCEDURE — 88305 TISSUE EXAM BY PATHOLOGIST: CPT

## 2021-10-05 RX ORDER — MIDAZOLAM HYDROCHLORIDE 1 MG/ML
INJECTION INTRAMUSCULAR; INTRAVENOUS PRN
Status: DISCONTINUED | OUTPATIENT
Start: 2021-10-05 | End: 2021-10-05 | Stop reason: SDUPTHER

## 2021-10-05 RX ORDER — SODIUM CHLORIDE, SODIUM LACTATE, POTASSIUM CHLORIDE, CALCIUM CHLORIDE 600; 310; 30; 20 MG/100ML; MG/100ML; MG/100ML; MG/100ML
INJECTION, SOLUTION INTRAVENOUS CONTINUOUS PRN
Status: DISCONTINUED | OUTPATIENT
Start: 2021-10-05 | End: 2021-10-05 | Stop reason: SDUPTHER

## 2021-10-05 RX ORDER — LIDOCAINE HYDROCHLORIDE AND EPINEPHRINE 10; 10 MG/ML; UG/ML
INJECTION, SOLUTION INFILTRATION; PERINEURAL PRN
Status: DISCONTINUED | OUTPATIENT
Start: 2021-10-05 | End: 2021-10-05 | Stop reason: ALTCHOICE

## 2021-10-05 RX ORDER — LIDOCAINE HYDROCHLORIDE 10 MG/ML
INJECTION, SOLUTION EPIDURAL; INFILTRATION; INTRACAUDAL; PERINEURAL PRN
Status: DISCONTINUED | OUTPATIENT
Start: 2021-10-05 | End: 2021-10-05 | Stop reason: SDUPTHER

## 2021-10-05 RX ORDER — SODIUM CHLORIDE 0.9 % (FLUSH) 0.9 %
10 SYRINGE (ML) INJECTION PRN
Status: DISCONTINUED | OUTPATIENT
Start: 2021-10-05 | End: 2021-10-05 | Stop reason: HOSPADM

## 2021-10-05 RX ORDER — SODIUM CHLORIDE, SODIUM LACTATE, POTASSIUM CHLORIDE, CALCIUM CHLORIDE 600; 310; 30; 20 MG/100ML; MG/100ML; MG/100ML; MG/100ML
1000 INJECTION, SOLUTION INTRAVENOUS CONTINUOUS
Status: DISCONTINUED | OUTPATIENT
Start: 2021-10-05 | End: 2021-10-05 | Stop reason: HOSPADM

## 2021-10-05 RX ORDER — PROPOFOL 10 MG/ML
INJECTION, EMULSION INTRAVENOUS CONTINUOUS PRN
Status: DISCONTINUED | OUTPATIENT
Start: 2021-10-05 | End: 2021-10-05 | Stop reason: SDUPTHER

## 2021-10-05 RX ORDER — MEPERIDINE HYDROCHLORIDE 50 MG/ML
12.5 INJECTION INTRAMUSCULAR; INTRAVENOUS; SUBCUTANEOUS
Status: COMPLETED | OUTPATIENT
Start: 2021-10-05 | End: 2021-10-05

## 2021-10-05 RX ORDER — ONDANSETRON 2 MG/ML
INJECTION INTRAMUSCULAR; INTRAVENOUS PRN
Status: DISCONTINUED | OUTPATIENT
Start: 2021-10-05 | End: 2021-10-05 | Stop reason: SDUPTHER

## 2021-10-05 RX ORDER — SODIUM CHLORIDE 9 MG/ML
INJECTION, SOLUTION INTRAVENOUS CONTINUOUS
Status: DISCONTINUED | OUTPATIENT
Start: 2021-10-05 | End: 2021-10-05 | Stop reason: HOSPADM

## 2021-10-05 RX ORDER — LIDOCAINE HYDROCHLORIDE 20 MG/ML
JELLY TOPICAL PRN
Status: DISCONTINUED | OUTPATIENT
Start: 2021-10-05 | End: 2021-10-05 | Stop reason: ALTCHOICE

## 2021-10-05 RX ORDER — SODIUM CHLORIDE 9 MG/ML
25 INJECTION, SOLUTION INTRAVENOUS PRN
Status: DISCONTINUED | OUTPATIENT
Start: 2021-10-05 | End: 2021-10-05 | Stop reason: HOSPADM

## 2021-10-05 RX ORDER — FENTANYL CITRATE 50 UG/ML
INJECTION, SOLUTION INTRAMUSCULAR; INTRAVENOUS PRN
Status: DISCONTINUED | OUTPATIENT
Start: 2021-10-05 | End: 2021-10-05 | Stop reason: SDUPTHER

## 2021-10-05 RX ORDER — DEXAMETHASONE SODIUM PHOSPHATE 10 MG/ML
INJECTION INTRAMUSCULAR; INTRAVENOUS PRN
Status: DISCONTINUED | OUTPATIENT
Start: 2021-10-05 | End: 2021-10-05 | Stop reason: SDUPTHER

## 2021-10-05 RX ORDER — MAGNESIUM HYDROXIDE 1200 MG/15ML
LIQUID ORAL CONTINUOUS PRN
Status: COMPLETED | OUTPATIENT
Start: 2021-10-05 | End: 2021-10-05

## 2021-10-05 RX ORDER — BUPIVACAINE HYDROCHLORIDE AND EPINEPHRINE 2.5; 5 MG/ML; UG/ML
INJECTION, SOLUTION INFILTRATION; PERINEURAL PRN
Status: DISCONTINUED | OUTPATIENT
Start: 2021-10-05 | End: 2021-10-05 | Stop reason: ALTCHOICE

## 2021-10-05 RX ORDER — SODIUM CHLORIDE 0.9 % (FLUSH) 0.9 %
10 SYRINGE (ML) INJECTION EVERY 12 HOURS SCHEDULED
Status: DISCONTINUED | OUTPATIENT
Start: 2021-10-05 | End: 2021-10-05 | Stop reason: HOSPADM

## 2021-10-05 RX ORDER — MEPERIDINE HYDROCHLORIDE 50 MG/ML
INJECTION INTRAMUSCULAR; INTRAVENOUS; SUBCUTANEOUS
Status: COMPLETED
Start: 2021-10-05 | End: 2021-10-05

## 2021-10-05 RX ORDER — LIDOCAINE 3% TOPICAL ANESTHETIC CREAM 0.03 G/G
CREAM TOPICAL
Qty: 85 G | Refills: 1 | Status: SHIPPED | OUTPATIENT
Start: 2021-10-05 | End: 2022-03-10

## 2021-10-05 RX ORDER — KETAMINE HYDROCHLORIDE 10 MG/ML
INJECTION, SOLUTION INTRAMUSCULAR; INTRAVENOUS PRN
Status: DISCONTINUED | OUTPATIENT
Start: 2021-10-05 | End: 2021-10-05 | Stop reason: SDUPTHER

## 2021-10-05 RX ORDER — GLYCOPYRROLATE 1 MG/5 ML
SYRINGE (ML) INTRAVENOUS PRN
Status: DISCONTINUED | OUTPATIENT
Start: 2021-10-05 | End: 2021-10-05 | Stop reason: SDUPTHER

## 2021-10-05 RX ORDER — ACETIC ACID 5 %
LIQUID (ML) MISCELLANEOUS PRN
Status: DISCONTINUED | OUTPATIENT
Start: 2021-10-05 | End: 2021-10-05 | Stop reason: ALTCHOICE

## 2021-10-05 RX ADMIN — FENTANYL CITRATE 50 MCG: 50 INJECTION, SOLUTION INTRAMUSCULAR; INTRAVENOUS at 10:21

## 2021-10-05 RX ADMIN — SODIUM CHLORIDE, POTASSIUM CHLORIDE, SODIUM LACTATE AND CALCIUM CHLORIDE 1000 ML: 600; 310; 30; 20 INJECTION, SOLUTION INTRAVENOUS at 09:51

## 2021-10-05 RX ADMIN — FENTANYL CITRATE 50 MCG: 50 INJECTION, SOLUTION INTRAMUSCULAR; INTRAVENOUS at 11:23

## 2021-10-05 RX ADMIN — Medication 0.2 MG: at 10:38

## 2021-10-05 RX ADMIN — PROPOFOL 88.85 MCG/KG/MIN: 10 INJECTION, EMULSION INTRAVENOUS at 10:22

## 2021-10-05 RX ADMIN — FENTANYL CITRATE 50 MCG: 50 INJECTION, SOLUTION INTRAMUSCULAR; INTRAVENOUS at 11:06

## 2021-10-05 RX ADMIN — KETAMINE HYDROCHLORIDE 20 MG: 10 INJECTION INTRAMUSCULAR; INTRAVENOUS at 11:24

## 2021-10-05 RX ADMIN — KETAMINE HYDROCHLORIDE 30 MG: 10 INJECTION INTRAMUSCULAR; INTRAVENOUS at 10:59

## 2021-10-05 RX ADMIN — LIDOCAINE HYDROCHLORIDE 50 MG: 10 INJECTION, SOLUTION EPIDURAL; INFILTRATION; INTRACAUDAL; PERINEURAL at 10:22

## 2021-10-05 RX ADMIN — MEPERIDINE HYDROCHLORIDE 12.5 MG: 50 INJECTION, SOLUTION INTRAMUSCULAR; INTRAVENOUS; SUBCUTANEOUS at 12:40

## 2021-10-05 RX ADMIN — KETAMINE HYDROCHLORIDE 20 MG: 10 INJECTION INTRAMUSCULAR; INTRAVENOUS at 10:29

## 2021-10-05 RX ADMIN — MEPERIDINE HYDROCHLORIDE 12.5 MG: 50 INJECTION INTRAMUSCULAR; INTRAVENOUS; SUBCUTANEOUS at 12:40

## 2021-10-05 RX ADMIN — SODIUM CHLORIDE, POTASSIUM CHLORIDE, SODIUM LACTATE AND CALCIUM CHLORIDE: 600; 310; 30; 20 INJECTION, SOLUTION INTRAVENOUS at 10:18

## 2021-10-05 RX ADMIN — ONDANSETRON 4 MG: 2 INJECTION, SOLUTION INTRAMUSCULAR; INTRAVENOUS at 12:03

## 2021-10-05 RX ADMIN — DEXAMETHASONE SODIUM PHOSPHATE 4 MG: 10 INJECTION INTRAMUSCULAR; INTRAVENOUS at 10:37

## 2021-10-05 RX ADMIN — MIDAZOLAM HYDROCHLORIDE 2 MG: 1 INJECTION, SOLUTION INTRAMUSCULAR; INTRAVENOUS at 10:19

## 2021-10-05 RX ADMIN — FENTANYL CITRATE 50 MCG: 50 INJECTION, SOLUTION INTRAMUSCULAR; INTRAVENOUS at 10:42

## 2021-10-05 ASSESSMENT — PULMONARY FUNCTION TESTS
PIF_VALUE: 0

## 2021-10-05 ASSESSMENT — PAIN SCALES - GENERAL
PAINLEVEL_OUTOF10: 3
PAINLEVEL_OUTOF10: 2
PAINLEVEL_OUTOF10: 3
PAINLEVEL_OUTOF10: 2
PAINLEVEL_OUTOF10: 2

## 2021-10-05 ASSESSMENT — PAIN DESCRIPTION - LOCATION: LOCATION: VAGINA

## 2021-10-05 ASSESSMENT — PAIN - FUNCTIONAL ASSESSMENT: PAIN_FUNCTIONAL_ASSESSMENT: 0-10

## 2021-10-05 ASSESSMENT — PAIN DESCRIPTION - PAIN TYPE: TYPE: SURGICAL PAIN

## 2021-10-05 NOTE — ANESTHESIA PRE PROCEDURE
Department of Anesthesiology  Preprocedure Note       Name:  Anjali Glynn   Age:  46 y.o.  :  1970                                          MRN:  [id number]         Date:  10/5/2021      Surgeon: Dee Acsota):  Karol Rizvi MD    Procedure: Procedure(s):  VULVAR COLPOSCOPY  RIGHT ZACK-VULVECTOMY, VULVAR BIOPSY, ULTRASONIC SCALPEL TREATMENT    Medications prior to admission:   Prior to Admission medications    Medication Sig Start Date End Date Taking? Authorizing Provider   diclofenac sodium (VOLTAREN) 1 % GEL APPLY 4 G TOPICALLY 4 TIMES DAILY AS NEEDED FOR PAIN ON KNEES 21  Yes Satya Garcia MD   tacrolimus (PROGRAF) 1 MG capsule 4 tabs bid plus 0.5 mg bid for total of 4.5 mg bid 19  Yes Satay Garcia MD   tacrolimus (PROGRAF) 0.5 MG capsule Take 1 capsule by mouth 2 times daily For total of 4.5 mg bid 19  Yes Satya Garcia MD   mycophenolate (MYFORTIC) 180 MG DR tablet Take 360 mg by mouth 2 times daily 2 tablets twice daily   Yes Historical Provider, MD       Current medications:    Current Facility-Administered Medications   Medication Dose Route Frequency Provider Last Rate Last Admin    0.9 % sodium chloride infusion   IntraVENous Continuous Delia Alexy, PA-C        0.9 % sodium chloride infusion  25 mL IntraVENous PRN Delia Alexy, PA-C        sodium chloride flush 0.9 % injection 10 mL  10 mL IntraVENous 2 times per day [facility], PA-C        sodium chloride flush 0.9 % injection 10 mL  10 mL IntraVENous PRN Delia Alexy, PA-C        lactated ringers infusion 1,000 mL  1,000 mL IntraVENous Continuous Marcela Bass MD           Allergies:     Allergies   Allergen Reactions    Chloraprep One Step [Chlorhexidine Gluconate] Itching    Morphine Hives     Itching and hives    Seasonal Itching    Cat Hair Extract Itching    Dust Mite Extract Itching       Problem List:    Patient Active Problem List   Diagnosis Code    Carotid artery stenosis I65.29    Depression F32. 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       Past Medical History:        Diagnosis Date    Abdominal pain 5/29/2010    Acute cystitis 10/21/2015    Acute pancreatitis 10/21/2015    Allergic rhinitis 10/21/2015    Arm pain 10/21/2015    Arm swelling 10/21/2015    Arteriovenous fistula ([address] 12/12/2014    Arthritis     bilateral knees    Depressive disorder 5/29/2010    Dysphagia 7/9/2010    Dysuria 10/21/2015    Edema 12/12/2014    End-stage renal disease ([address] 5/29/2010    Fistula 10/21/2015    Gout     Hemodialysis access site with arteriovenous graft ([address]     Hemodialysis patient ([address]     [id number].  History of kidney transplant 10/21/2015    History of renal transplant 3/27/2014   Diannia Hernan and fatigue 7/9/2010    Palpitations 10/21/2015    Polycystic kidney disease     bilateral. kidney transplant. No longer sees Nephrology.  Sees Urologist @  [facility] drip 10/21/2015    Restless legs syndrome     Tachycardia 11/24/2010    Vaginal lesion 7/1/2021    Vulvar carcinoma ([address]     Vulvar edema     Vulvar mass Right 8/19/2021       Past Surgical History:        Procedure Laterality Date    AV FISTULA CREATION      AV FISTULA REPAIR      took fistula down/ reversed    GALLBLADDER SURGERY      LAPAROSCOPIC    HERNIA REPAIR      MULIPTLE ABDOMINAL WALL FROM PERITONEAL DIALYSIS AND ABD SURGERIES    HYSTERECTOMY      PARTIAL- BOTH OVARIES STILL THERE    [facility]  [facility]    MANDIBLE FRACTURE SURGERY      FOR BITE CORRECTION- NO TRAUMA    PARATHYROID GLAND SURGERY  2006    REMOVED 1 GLAND    TOTAL NEPHRECTOMY Bilateral     POLYCYSTIC KIDNEY       Social History:    Social History     Tobacco Use    Smoking status: Former Smoker     Packs/day: 0.25     Years: 5.00     Pack years: 1.25     Types: Cigarettes     Quit date: 1991     Years since quittin.7    Smokeless tobacco: Never Used   Substance Use Topics    Alcohol use: Yes     Comment: very seldom. few times a year                                Counseling given: Not Answered      Vital Signs (Current): There were no vitals filed for this visit.                                            BP Readings from Last 3 Encounters:   21 (!) 137/96   10/01/21 122/84   21 121/87       NPO Status: Time of last liquid consumption:                         Time of last solid consumption:                         Date of last liquid consumption: 10/05/21                        Date of last solid food consumption: 10/03/21    BMI:   Wt Readings from Last 3 Encounters:   21 261 lb (118.4 kg)   10/01/21 256 lb (116.1 kg)   21 257 lb 9.6 oz (116.8 kg)     There is no height or weight on file to calculate BMI.    CBC:   Lab Results   Component Value Date    WBC 7.2 2021    RBC 4.40 2021    RBC 4.44 2020    HGB 12.9 2021    HCT 40.0 2021    MCV 90.9 2021    RDW 13.0 2021     2021       CMP:   Lab Results   Component Value Date     2021    K 4.8 2021     2021    CO2 22 2021    BUN 12 2021    CREATININE 0.84 2021    GFRAA >60 2021    LABGLOM >60 2021    GLUCOSE 110 2021    GLUCOSE 105 2020    PROT 7.4 2021    PROT 7.0 2020    CALCIUM 9.2 2021    BILITOT 0.49 2021    ALKPHOS 72 2021    AST 16 2021    ALT 18 2021       POC Tests: No results for input(s): POCGLU, POCNA, POCK, POCCL, POCBUN, POCHEMO, POCHCT in the last 72 hours.    Coags: No results found for: PROTIME, INR, APTT    HCG (If Applicable): No results found for: PREGTESTUR, PREGSERUM, HCG, HCGQUANT     ABGs: No results found for: PHART, PO2ART, KHH6VHL, FUA8JKW, BEART, V9OREPFI     Type & Screen (If Applicable):  No results found for: LABABO, LABRH    Drug/Infectious Status (If Applicable):  No results found for: HIV, HEPCAB    COVID-19 Screening (If Applicable):   Lab Results   Component Value Date    COVID19 Not Detected 10/01/2021           Anesthesia Evaluation  Patient summary reviewed and Nursing notes reviewed no history of anesthetic complications:   Airway: Mallampati: II  TM distance: >3 FB     Mouth opening: > = 3 FB Dental: normal exam         Pulmonary:normal exam    (+) sleep apnea:                             Cardiovascular:  Exercise tolerance: good (>4 METS),   (+) hypertension:, hyperlipidemia    (-) past MI, CAD, CABG/stent, dysrhythmias,  angina,  CHF and orthopnea      Rhythm: regular  Rate: normal           Beta Blocker:  Not on Beta Blocker         Neuro/Psych:   (+) headaches:, psychiatric history:            GI/Hepatic/Renal: Neg GI/Hepatic/Renal ROS            Endo/Other: Negative Endo/Other ROS                    Abdominal:             Vascular:   + PVD, aortic or cerebral, . Other Findings:           Anesthesia Plan      MAC and TIVA     ASA 3       Induction: intravenous. MIPS: Postoperative opioids intended and Prophylactic antiemetics administered. Anesthetic plan and risks discussed with patient.       Plan discussed with CRNA and surgical team.                  Carmelo Gonzalez MD   10/5/2021

## 2021-10-05 NOTE — ANESTHESIA POSTPROCEDURE EVALUATION
Department of Anesthesiology  Postprocedure Note    Patient: Chichi Mendoza  MRN: [id number]  YOB: 1970  Date of evaluation: 10/5/2021  Time:  1:26 PM     Procedure Summary     Date: 10/05/21 Room / Location: [address]    Anesthesia Start: 1018 Anesthesia Stop: 6438    Procedure: RIGHT PARTIAL ZACK-VULVECTOMY, ULTRASONIC SCALPEL TREATMENT (Right Vulva) Diagnosis: (VULVAR CARCINOMA INSITU)    Surgeons: Jade Ferro MD Responsible Provider: Deborah Gee MD    Anesthesia Type: MAC, TIVA ASA Status: 3          Anesthesia Type: MAC, TIVA    Nate Phase I:      Nate Phase II: Nate Score: 10    Last vitals: Reviewed and per EMR flowsheets.        Anesthesia Post Evaluation    Patient location during evaluation: PACU  Patient participation: complete - patient participated  Level of consciousness: awake and alert  Pain score: 4  Airway patency: patent  Nausea & Vomiting: no nausea and no vomiting  Complications: no  Cardiovascular status: hemodynamically stable  Respiratory status: room air  Hydration status: euvolemic

## 2021-10-05 NOTE — BRIEF OP NOTE
Brief Operative Note  Department of Gynecology Oncology  [facility]     Patient: Juarez Harrison   : 1970  MRN: [id number]       Acct: [de-identified]   Date of Procedure: 10/5/21     Pre-operative Diagnosis: 46 y.o. female   Vulvar Intraepithelial Neoplasia (DARRELL) grade 3    Post-operative Diagnosis: same    Procedure: right partial hemivulvectomy with ultrasonic scalpel treatment    Surgeon: Pedro Yeager MD     Assistant(s): Tiffanie Gr PA-C; Alexus Bonilla, PGY4; Angelito Zavaleta, PGY2; Messi Cruz, MS4    Anesthesia: general     Findings:  Right vulvar lesion approximately 8cm x5cm extending to perineum. Acetowhite changes right periclitorial region and of medial portion of right lower vulva. Total IV fluids/Blood products:  600 ml crystalloid  Urine Output:  Patient voided prior to start of procedure    Estimated blood loss:  25ml  Drains:  none  Specimens:  Right vulva (black stitch in posterior perineal area, blue stitch on vaginal side, white stitch upper anterior near mons) and vulvar epithelium. Instrument and Sponge Count: Correct  Complications:  none  Condition:  stable, transferred to post anesthesia recovery    See full operative report for further details. Alexus Bonilla DO  Ob/Gyn Resident  Pager        Attending Physician Statement  I have discussed the care of Juarez Harrison, including pertinent history and exam findings,  with the resident. I have seen and examined the patient and the key elements of all parts of the encounter have been performed by me. I agree with the assessment, plan and orders as documented by the resident.      Pedro Yeager MD

## 2021-10-05 NOTE — H&P
difficulties. \"      The procedure risks and complications were reviewed. The labs, Consent, and H&P were reviewed and updated. The patient was counseled on the possibility of  the need of a second surgery. The patient voiced understanding and had all of her questions answered. The possibility of incomplete removal of abnormal tissue was discussed. OBSTETRICAL HISTORY:   OB History   No obstetric history on file. PAST MEDICAL HISTORY:   has a past medical history of Abdominal pain, Acute cystitis, Acute pancreatitis, Allergic rhinitis, Arm pain, Arm swelling, Arteriovenous fistula (HCC), Arthritis, Depressive disorder, Dysphagia, Dysuria, Edema, End-stage renal disease ([address] Fistula, Gout, Hemodialysis access site with arteriovenous graft ([address] Hemodialysis patient ([address] History of kidney transplant, History of renal transplant, Malaise and fatigue, Palpitations, Polycystic kidney disease, Post-nasal drip, Restless legs syndrome, Tachycardia, Vaginal lesion, Vulvar carcinoma ([address] Vulvar edema, and Vulvar mass Right. PAST SURGICAL HISTORY:   has a past surgical history that includes Mandible fracture surgery; total nephrectomy (Bilateral); Parathyroid gland surgery (2006); Gallbladder surgery; hernia repair; Hysterectomy; Kidney transplant (2012); AV fistula creation; and AV fistula repair.     ALLERGIES:  Allergies as of 09/16/2021 - Fully Reviewed 09/16/2021   Allergen Reaction Noted    Cat hair extract  09/20/2019    Chloraprep one step [chlorhexidine gluconate]  10/21/2015    Dust mite extract  10/21/2015    Morphine  10/21/2015    Seasonal  09/20/2019       MEDICATIONS:  Current Facility-Administered Medications   Medication Dose Route Frequency Provider Last Rate Last Admin    0.9 % sodium chloride infusion   IntraVENous Continuous Delia Tracey PA-C        0.9 % sodium chloride infusion  25 mL IntraVENous PRN Delia Tracey PA-C        sodium chloride flush 0.9 % injection 10 mL  10 mL IntraVENous 2 times per day ARTIS Aburto        sodium chloride flush 0.9 % injection 10 mL  10 mL IntraVENous PRN Delia Tracey PA-C        lactated ringers infusion 1,000 mL  1,000 mL IntraVENous Continuous Lamont Bonilla MD        HYDROmorphone (DILAUDID) injection 0.25 mg  0.25 mg IntraVENous Q5 Min PRN Lamont Bonilla MD        HYDROmorphone (DILAUDID) injection 0.5 mg  0.5 mg IntraVENous Q5 Min PRN Clayton Burch MD           FAMILY HISTORY:  family history includes Cancer in her maternal uncle; Diabetes in her maternal grandmother; Heart Disease in her mother; High Blood Pressure in her father; Kidney Disease in her mother and another family member; Other in her maternal grandfather. SOCIAL HISTORY:   reports that she quit smoking about 30 years ago. Her smoking use included cigarettes. She has a 1.25 pack-year smoking history. She has never used smokeless tobacco. She reports current alcohol use. She reports that she does not use drugs.     VITALS:  Vitals:    10/05/21 0939   BP: (!) 136/93   Pulse: 82   Resp: 14   Temp: 98.6 °F (37 °C)   TempSrc: Temporal   SpO2: 97%   Weight: 253 lb (114.8 kg)   Height: 5' 9\" (1.753 m)                                                                                                                               PHYSICAL EXAM:     Unchanged from Prior H&P  CONSTITUTIONAL:  Alert and oriented, no acute distress  HEAD: normocephalic, atraumatic  EYES: Pupils equal and reactive to light, Extraocular muscles intact, sclera non icteric  ENT: Mucus membranes moist, No otorrhea, no rhinorrhea  NECK:  supple, symmetrical, trachea midline   LUNGS:  Good air movement bilaterally, unlabored respirations, no wheezes or rhonchi  CARDIOVASCULAR: Regular rate and rhythm, no murmurs rubs or gallops  ABDOMEN: soft, non tender, non distended, no rebound or guarding, no hernias, no hepatomegaly, no splenomegly  MUSCULOSKELETAL:  Equal strength bilaterally, normal degrees Final    Color, UA 09/27/2021 Yellow  Yellow Final    Turbidity UA 09/27/2021 Clear  Clear Final    Glucose, Ur 09/27/2021 NEGATIVE  NEGATIVE Final    Bilirubin Urine 09/27/2021 NEGATIVE  NEGATIVE Final    Ketones, Urine 09/27/2021 NEGATIVE  NEGATIVE Final    Specific Gravity, UA 09/27/2021 1.010  1.005 - 1.030 Final    Urine Hgb 09/27/2021 NEGATIVE  NEGATIVE Final    pH, UA 09/27/2021 6.0  5.0 - 8.0 Final    Protein, UA 09/27/2021 NEGATIVE  NEGATIVE Final    Urobilinogen, Urine 09/27/2021 Normal  Normal Final    Nitrite, Urine 09/27/2021 NEGATIVE  NEGATIVE Final    Leukocyte Esterase, Urine 09/27/2021 NEGATIVE  NEGATIVE Final    Urinalysis Comments 09/27/2021 Microscopic exam not performed based on chemical results unless requested in original order. Final    Glucose 09/27/2021 110* 70 - 99 mg/dL Final    BUN 09/27/2021 12  6 - 20 mg/dL Final    CREATININE 09/27/2021 0.84  0.50 - 0.90 mg/dL Final    Bun/Cre Ratio 09/27/2021 NOT REPORTED  9 - 20 Final    Calcium 09/27/2021 9.2  8.6 - 10.4 mg/dL Final    Sodium 09/27/2021 138  135 - 144 mmol/L Final    Potassium 09/27/2021 4.8  3.7 - 5.3 mmol/L Final    Chloride 09/27/2021 104  98 - 107 mmol/L Final    CO2 09/27/2021 22  20 - 31 mmol/L Final    Anion Gap 09/27/2021 12  9 - 17 mmol/L Final    GFR Non- 09/27/2021 >60  >60 mL/min Final    GFR  09/27/2021 >60  >60 mL/min Final    GFR Comment 09/27/2021        Final    Comment: Average GFR for 52-63 years old:   80 mL/min/1.73sq m  Chronic Kidney Disease:   <60 mL/min/1.73sq m  Kidney failure:   <15 mL/min/1.73sq m              eGFR calculated using average adult body mass.  Additional eGFR calculator available at:        [employer]            GFR Staging 09/27/2021 NOT REPORTED   Final    WBC 09/27/2021 7.2  3.5 - 11.3 k/uL Final    RBC 09/27/2021 4.40  3.95 - 5.11 m/uL Final    Hemoglobin 09/27/2021 12.9  11.9 - 15.1 g/dL Final    Hematocrit 09/27/2021 40.0  36.3 - 47.1 % Final    MCV 09/27/2021 90.9  82.6 - 102.9 fL Final    MCH 09/27/2021 29.3  25.2 - 33.5 pg Final    MCHC 09/27/2021 32.3  28.4 - 34.8 g/dL Final    RDW 09/27/2021 13.0  11.8 - 14.4 % Final    Platelets 41/28/5095 209  138 - 453 k/uL Final    MPV 09/27/2021 10.1  8.1 - 13.5 fL Final    NRBC Automated 09/27/2021 0.0  0.0 per 100 WBC Final    Differential Type 09/27/2021 NOT REPORTED   Final    Seg Neutrophils 09/27/2021 62  36 - 65 % Final    Lymphocytes 09/27/2021 26  24 - 43 % Final    Monocytes 09/27/2021 7  3 - 12 % Final    Eosinophils % 09/27/2021 4  1 - 4 % Final    Basophils 09/27/2021 1  0 - 2 % Final    Immature Granulocytes 09/27/2021 0  0 % Final    Segs Absolute 09/27/2021 4.44  1.50 - 8.10 k/uL Final    Absolute Lymph # 09/27/2021 1.88  1.10 - 3.70 k/uL Final    Absolute Mono # 09/27/2021 0.47  0.10 - 1.20 k/uL Final    Absolute Eos # 09/27/2021 0.30  0.00 - 0.44 k/uL Final    Basophils Absolute 09/27/2021 0.05  0.00 - 0.20 k/uL Final    Absolute Immature Granulocyte 09/27/2021 0.03  0.00 - 0.30 k/uL Final    WBC Morphology 09/27/2021 NOT REPORTED   Final    RBC Morphology 09/27/2021 NOT REPORTED   Final    Platelet Estimate 42/70/3580 NOT REPORTED   Final   Hospital Outpatient Visit on 09/18/2021   Component Date Value Ref Range Status    WBC 09/18/2021 5.3  3.5 - 11.0 k/uL Final    RBC 09/18/2021 4.44  4.0 - 5.2 m/uL Final    Hemoglobin 09/18/2021 13.2  12.0 - 16.0 g/dL Final    Hematocrit 09/18/2021 39.9  36 - 46 % Final    MCV 09/18/2021 89.9  80 - 100 fL Final    MCH 09/18/2021 29.7  26 - 34 pg Final    MCHC 09/18/2021 33.1  31 - 37 g/dL Final    RDW 09/18/2021 13.8  11.5 - 14.9 % Final    Platelets 77/00/0853 199  150 - 450 k/uL Final    MPV 09/18/2021 7.9  6.0 - 12.0 fL Final    NRBC Automated 09/18/2021 NOT REPORTED  per 100 WBC Final    Differential Type 09/18/2021 NOT REPORTED   Final    Seg Neutrophils 09/18/2021 58  36 - 66 % Final    Lymphocytes 09/18/2021 30 24 - 44 % Final    Monocytes 09/18/2021 6  1 - 7 % Final    Eosinophils % 09/18/2021 5* 0 - 4 % Final    Basophils 09/18/2021 1  0 - 2 % Final    Immature Granulocytes 09/18/2021 NOT REPORTED  0 % Final    Segs Absolute 09/18/2021 3.10  1.3 - 9.1 k/uL Final    Absolute Lymph # 09/18/2021 1.50  1.0 - 4.8 k/uL Final    Absolute Mono # 09/18/2021 0.30  0.1 - 1.3 k/uL Final    Absolute Eos # 09/18/2021 0.30  0.0 - 0.4 k/uL Final    Basophils Absolute 09/18/2021 0.00  0.0 - 0.2 k/uL Final    Absolute Immature Granulocyte 09/18/2021 NOT REPORTED  0.00 - 0.30 k/uL Final    WBC Morphology 09/18/2021 NOT REPORTED   Final    RBC Morphology 09/18/2021 NOT REPORTED   Final    Platelet Estimate 15/50/2988 NOT REPORTED   Final    Glucose 09/18/2021 112* 70 - 99 mg/dL Final    BUN 09/18/2021 12  6 - 20 mg/dL Final    CREATININE 09/18/2021 0.99* 0.50 - 0.90 mg/dL Final    Bun/Cre Ratio 09/18/2021 NOT REPORTED  9 - 20 Final    Calcium 09/18/2021 9.0  8.6 - 10.4 mg/dL Final    Sodium 09/18/2021 135  135 - 144 mmol/L Final    Potassium 09/18/2021 4.5  3.7 - 5.3 mmol/L Final    Chloride 09/18/2021 104  98 - 107 mmol/L Final    CO2 09/18/2021 20  20 - 31 mmol/L Final    Anion Gap 09/18/2021 11  9 - 17 mmol/L Final    Alkaline Phosphatase 09/18/2021 72  35 - 104 U/L Final    ALT 09/18/2021 18  5 - 33 U/L Final    AST 09/18/2021 16  <32 U/L Final    Total Bilirubin 09/18/2021 0.49  0.3 - 1.2 mg/dL Final    Total Protein 09/18/2021 7.4  6.4 - 8.3 g/dL Final    Albumin 09/18/2021 4.0  3.5 - 5.2 g/dL Final    Albumin/Globulin Ratio 09/18/2021 NOT REPORTED  1.0 - 2.5 Final    GFR Non- 09/18/2021 59* >60 mL/min Final    GFR  09/18/2021 >60  >60 mL/min Final    GFR Comment 09/18/2021        Final    Comment: Average GFR for 52-63 years old:   80 mL/min/1.73sq m  Chronic Kidney Disease:   <60 mL/min/1.73sq m  Kidney failure:   <15 mL/min/1.73sq m              eGFR calculated using average adult body mass. Additional eGFR calculator available at:        [employer]            GFR Staging 09/18/2021 NOT REPORTED   Final    Cholesterol 09/18/2021 208* <200 mg/dL Final    Comment:    Cholesterol Guidelines:      <200  Desirable   200-240  Borderline      >240  Undesirable         HDL 09/18/2021 44  >40 mg/dL Final    Comment:    HDL Guidelines:    <40     Undesirable   40-59    Borderline    >59     Desirable         LDL Cholesterol 09/18/2021 138* 0 - 130 mg/dL Final    Comment:    LDL Guidelines:     <100    Desirable   100-129   Near to/above Desirable   130-159   Borderline      >159   Undesirable     Direct (measured) LDL and calculated LDL are not interchangeable tests. Chol/HDL Ratio 09/18/2021 4.7  <5 Final            Triglycerides 09/18/2021 132  <150 mg/dL Final    Comment:    Triglyceride Guidelines:     <150   Desirable   150-199  Borderline   200-499  High     >499   Very high   Based on AHA Guidelines for fasting triglyceride, October 2012. VLDL 09/18/2021 NOT REPORTED  1 - 30 mg/dL Final    Magnesium 09/18/2021 1.6  1.6 - 2.6 mg/dL Final    Phosphorus 09/18/2021 2.5* 2.6 - 4.5 mg/dL Final    Uric Acid 09/18/2021 7.0* 2.4 - 5.7 mg/dL Final    Prograf 09/18/2021 5.3  ng/mL Final    Comment: (NOTE)  Therapeutic Range:  Kidney transplant:  0-3 months post-transplant: 7.0-20.0 ng/mL  3 months and older: 5.0-15.0 ng/mL  Liver transplant:  1-12 months post-transplant: 5-20 ng/mL  Heart transplant:  0-3 months post-transplant: 10.0-20.0 ng/mL  3 months and older: 5.0-15.0 ng/mL  Toxic value: Greater than 25 ng/mL  Therapeutic range is based on a whole blood specimen drawn 12   hours post-dose or prior to next dose (the trough).  The optimal   therapeutic range for a given patient may differ from this   suggested range based on the indication for therapy, treatment   phase (initiation or maintenance), use in combination with other   drugs, time of specimen collection relative to prior dose, type of   transplanted organ, and/or the therapeutic approach of the   transplant center. This test was developed and its performance characteristics   determined by [facility]. It has not been cleared or   approved by the [employer] and Drug Administration. This test was   performed in                            a CLIA certified laboratory and is intended for   clinical purposes. Performed By: [facility]  [place], [address] [facility]  : Nicole Johnston. Jaron Oreilly MD         DIAGNOSTICS:  XR CHEST (2 VW)    Result Date: 9/27/2021  EXAMINATION: TWO XRAY VIEWS OF THE CHEST 9/27/2021 10:25 am COMPARISON: 10/18/2006 HISTORY: ORDERING SYSTEM PROVIDED HISTORY: Pre-op evaluation TECHNOLOGIST PROVIDED HISTORY: Reason for Exam: no chest complaints FINDINGS: The lungs are without acute focal process. No effusion or pneumothorax. The cardiomediastinal silhouette is normal.  The osseous structures are intact without acute process. Negative chest.       DIAGNOSIS & PLAN:  1. Vulvar intraepithelial neoplasia (DARRELL) grade 3   - Proceed with planned procedure: Vulvar colposcopy with microscope, right hemivulvectomy, Ultrasonic scalpel partial vulvectomy treatment    - Consent signed, on chart. - The patient is ready for transport to the operative suite. Counseling: The patient was counseled on all options both medical and surgical, conservative as well as definitive. She has elected to proceed with the procedure as stated above. The patient was counseled on the procedure. Risks and complications were reviewed in detail. The patients orders, labs, consents have been completed. The history and physical as well as all supporting surgical documentation will be forwarded to the pre-operative holding area. The patient is aware that this procedure may not alleviate her symptoms.  That there may be a necessity for a second surgery and that there may be an incomplete removal of abnormal tissue. Bogdan Butt DO  Ob/Gyn Resident  Pager: [phone]  [facility], [address] [place], [place] [address]kalyan Escudero   10/5/2021, 9:43 AM        Attending Physician Statement  I have discussed the care of [address] [place] [address] Drive,[facility], including pertinent history and exam findings,  with the resident. I have seen and examined the patient and the key elements of all parts of the encounter have been performed by me. I agree with the assessment, plan and orders as documented by the resident.      Janey Long MD

## 2021-10-05 NOTE — PROGRESS NOTES
Discharge instructions and prescriptions reviewed with patient and . Both acknowledged understanding.

## 2021-10-05 NOTE — PROGRESS NOTES
OB Resident Progress Note      Patient may be discharged home once she has met criteria in the PACU. Please perfect serve or page OB/GYN resident listed below with any questions, concerns, or if patient has not met PACU discharge criteria in 2-3 hours.  .  Please page first.    Lisa Us DO  OB/GYN Resident  PGY3  Pager: [phone] [address], [address]  10/5/2021, 12:13 PM

## 2021-10-06 LAB — SURGICAL PATHOLOGY REPORT: NORMAL

## 2021-10-06 NOTE — OP NOTE
[facility]                                OPERATIVE REPORT    PATIENT NAME: Jennifer Alvarez                   :        1970  MED REC NO:   [id number]                             ROOM:  ACCOUNT NO:   [de-identified]                           ADMIT DATE: 10/05/2021  PROVIDER:     Lawanda Arciniega MD    DATE OF PROCEDURE:  10/05/2021    PREOPERATIVE DIAGNOSIS:  Large right vulvar lesion and extensive vulvar  intraepithelial neoplasia III. POSTOPERATIVE DIAGNOSIS:  Large right vulvar lesion and extensive vulvar  intraepithelial neoplasia III. OPERATION PERFORMED:  Right hemivulvectomy, partial and partial  vulvectomy with ultrasonic scalpel treatment. SURGEON:  Lawanda Arciniega MD    ASSISTANTS:  Lexi Maria DO; Philippe Genao DO; Araseli Platt PA-C; and Romana Sanes, fourth year medical student. ANESTHESIA:  MAC. SPECIAL MEDICATIONS:  Included injection of 0.25% bupivacaine with  epinephrine and lidocaine in a 50:50 mixture and clindamycin cream 2%  with 50:50 mixture of lidocaine gel to vulvar wounds postoperatively. No other special medications. PACKS:  None. LINES:  Peripheral IV of lactated Ringer's during the procedure. DRAINS:  There were no wound drains. SPECIMENS:  Right vulvar lesion and partial vulvectomy with ultrasonic  scalpel. FINDINGS:  The patient was found to have a large white lesion  approximately 4 x 5 cm in size on the right mid and lower vulva. She  also had scattered areas of DARRELL III. BRIEF HISTORY AND INDICATION FOR OPERATION:  The patient is a  58-year-old  3, para 1 female who noted a vulvar roughening and  irritation for approximately one year. The PCP eventually noted the  area and prescribed Neosporin. The irritation continued to worsen,  especially on the right lower vulva. With increasing pain, she was  placed on Cipro.   She did have some improvement with this. She was  referred to Dr. Charlotte Rivas, OB/GYN. Dr. Kat Caldera performed  colposcopy and a biopsy that revealed DARRELL III. The patient was then referred to [place] for further  workup and definitive management. She was seen initially on 09/16/2021. She had a history of having a  renal transplant nine years ago and is currently on Prograf along with a  number of other immunosuppressive medications. She also sees a  nephrologist and a urologist on a regular basis. The patient did smoke cigarettes approximately 30 years ago but has not  smoked since. She had no other cardiac or pulmonary or medical  difficulties. She was advised to have the larger lesion excised with a margin of  approximately 0.5 to 1 cm as well as ultrasonic scalpel treatment of the  remainder of the vulvar lesions. The patient agreed and was taken to the operating room on 10/05/2021. DESCRIPTION OF OPERATIVE PROCEDURE:  The patient was given MAC  anesthesia and was placed in the dorsal lithotomy position and prepped  and draped in the usual fashion. The large lesion emanating from the  right mid and lower vulva did extend to the perineum, but did not cross  the midline. It did extend to the vagina posteriorly as well. After prepping and draping in the usual fashion, we placed 5% acetic  acid on the vulva and the dysplastic areas appeared. We initially  performed an excisional right hemivulvectomy with a margin of  approximately 0.5 to 1 cm around the larger lesion. This involved the  labium majus and labium minus on the right side and a portion of the  upper right perineum as well. This was full skin thickness which also included some subdermal fat. The Bovie cautery was used for hemostasis in the depths of the wound. We then reapplied the 5% acetic acid and noted the mildly dysplastic  areas that extended on both sides of the vulva bilaterally.   We used the  ultrasonic scalpel to completely remove all these areas with a margin of  at least 0.5 cm. This included vaginal mucosa as well as vulvar skin  and perineum. Following this, hemostasis was obtained with a Bovie cautery. We then  closed the larger wound with an internal layer of 3-0 Vicryl in  interrupted fashion. The skin was reapproximated also with 3-0 dyed  Vicryl in interrupted fashion. The result looked very good. The skin surrounding the incision was infiltrated with 0.25% bupivacaine  and 1% lidocaine in a 50:50 mixture for pain control. The patient was then taken to the recovery room in satisfactory  condition. She tolerated the surgery and anesthesia well.         Xochilt Lemus MD    D: 10/05/2021 12:48:45       T: 10/05/2021 12:55:27     [email or web address]  Job#: [id number]     Doc#: [id number]    CC:

## 2021-10-21 ENCOUNTER — OFFICE VISIT (OUTPATIENT)
Dept: GYNECOLOGIC ONCOLOGY | Age: 51
End: 2021-10-21

## 2021-10-21 VITALS
HEIGHT: 69 IN | WEIGHT: 259.4 LBS | TEMPERATURE: 97.3 F | SYSTOLIC BLOOD PRESSURE: 131 MMHG | HEART RATE: 111 BPM | BODY MASS INDEX: 38.42 KG/M2 | OXYGEN SATURATION: 96 % | DIASTOLIC BLOOD PRESSURE: 87 MMHG

## 2021-10-21 DIAGNOSIS — D07.1 VULVAR INTRAEPITHELIAL NEOPLASIA (VIN) GRADE 3: Primary | ICD-10-CM

## 2021-10-21 PROCEDURE — 99024 POSTOP FOLLOW-UP VISIT: CPT | Performed by: OBSTETRICS & GYNECOLOGY

## 2021-10-21 ASSESSMENT — ENCOUNTER SYMPTOMS
GASTROINTESTINAL NEGATIVE: 1
RESPIRATORY NEGATIVE: 1
EYES NEGATIVE: 1

## 2021-10-21 NOTE — PATIENT INSTRUCTIONS
Continue the Betadine rinses and clindamycin cream as before. Call if you have any further questions or problems. Return in 4 weeks.

## 2021-10-21 NOTE — PROGRESS NOTES
[address] [place] [address], [facility], [address] [address]    eTre Mayer is a 46 y.o. female who presents for a Post Operative visit today following her surgery of 2021    CC: Precancerous lesion of vulva    HPI: Sarah Talbert is a 57-year-old  3, para 3 female who was seen on 2021 because of recent biopsy revealing DARRELL 3. The patient states that she had noticed a roughening and irritation for over a year. She told her PCP who prescribed Neosporin initially but the lesion did not improve. In 2021 the irritation increased with increasing pain. She was placed on Cipro which did help the pain improve. She was then referred to Dr. Aurea Chang, OB/GYN who performed colposcopy and a biopsy. Pathology revealed DARRELL 3. He did refer her to [facility] gynecologic oncology for further work-up and management. The patient was a cigarette smoker for several years but has not smoked for 30 years. On 10/5/2021 the patient had surgery and had a large portion of the right labia majora removed. Surrounding areas showed milder areas of dysplasia. These were all treated with the ultrasonic scalpel to approximately 1 cm beyond the visible lesions. The wound was closed with 3-0 Vicryl interrupted sutures. Since that time the patient has been using Betadine rinses 2-3 times a day followed by a clindamycin and lidocaine cream.    She states that there is some discomfort still occasionally but it is tolerable. She has no other complaints. .    ROS:  I have personally reviewed and agree with the review of systems done by my ancillary staff in the [facility] documentation.     Objective:  Vitals:    10/21/21 0912   BP: 131/87   Site: Right Upper Arm   Position: Sitting   Cuff Size: Medium Adult   Pulse: 111   Temp: 97.3 °F (36.3 °C)   SpO2: 96%   Weight: 259 lb 6.4 oz (117.7 kg)   Height: 5' 9\" (1.753 m)       Physical Exam:  General: Patient is in no

## 2021-10-30 ENCOUNTER — HOSPITAL ENCOUNTER (OUTPATIENT)
Age: 51
Discharge: HOME | End: 2021-10-30
Payer: COMMERCIAL

## 2021-10-30 LAB
ABSOLUTE EOS #: 0.3 K/UL (ref 0–0.4)
ABSOLUTE IMMATURE GRANULOCYTE: NORMAL K/UL (ref 0–0.3)
ABSOLUTE LYMPH #: 1.6 K/UL (ref 1–4.8)
ABSOLUTE MONO #: 0.3 K/UL (ref 0.1–1.3)
ALBUMIN SERPL-MCNC: 4 G/DL (ref 3.5–5.2)
ALBUMIN/GLOBULIN RATIO: ABNORMAL (ref 1–2.5)
ALP BLD-CCNC: 77 U/L (ref 35–104)
ALT SERPL-CCNC: 12 U/L (ref 5–33)
ANION GAP SERPL CALCULATED.3IONS-SCNC: 12 MMOL/L (ref 9–17)
AST SERPL-CCNC: 12 U/L
BASOPHILS # BLD: 1 % (ref 0–2)
BASOPHILS ABSOLUTE: 0 K/UL (ref 0–0.2)
BILIRUB SERPL-MCNC: 0.56 MG/DL (ref 0.3–1.2)
BUN BLDV-MCNC: 17 MG/DL (ref 6–20)
BUN/CREAT BLD: ABNORMAL (ref 9–20)
CALCIUM SERPL-MCNC: 9.3 MG/DL (ref 8.6–10.4)
CHLORIDE BLD-SCNC: 105 MMOL/L (ref 98–107)
CHOLESTEROL/HDL RATIO: 5.1
CHOLESTEROL: 236 MG/DL
CO2: 21 MMOL/L (ref 20–31)
CREAT SERPL-MCNC: 1.1 MG/DL (ref 0.5–0.9)
DIFFERENTIAL TYPE: NORMAL
EOSINOPHILS RELATIVE PERCENT: 4 % (ref 0–4)
ESTIMATED AVERAGE GLUCOSE: 103 MG/DL
GFR AFRICAN AMERICAN: >60 ML/MIN
GFR NON-AFRICAN AMERICAN: 52 ML/MIN
GFR SERPL CREATININE-BSD FRML MDRD: ABNORMAL ML/MIN/{1.73_M2}
GFR SERPL CREATININE-BSD FRML MDRD: ABNORMAL ML/MIN/{1.73_M2}
GLUCOSE BLD-MCNC: 109 MG/DL (ref 70–99)
HBA1C MFR BLD: 5.2 % (ref 4–6)
HCT VFR BLD CALC: 40 % (ref 36–46)
HDLC SERPL-MCNC: 46 MG/DL
HEMOGLOBIN: 13.6 G/DL (ref 12–16)
IMMATURE GRANULOCYTES: NORMAL %
LDL CHOLESTEROL: 159 MG/DL (ref 0–130)
LYMPHOCYTES # BLD: 28 % (ref 24–44)
MAGNESIUM: 1.7 MG/DL (ref 1.6–2.6)
MCH RBC QN AUTO: 29.7 PG (ref 26–34)
MCHC RBC AUTO-ENTMCNC: 33.9 G/DL (ref 31–37)
MCV RBC AUTO: 87.6 FL (ref 80–100)
MONOCYTES # BLD: 6 % (ref 1–7)
NRBC AUTOMATED: NORMAL PER 100 WBC
PDW BLD-RTO: 13.4 % (ref 11.5–14.9)
PHOSPHORUS: 3.1 MG/DL (ref 2.6–4.5)
PLATELET # BLD: 192 K/UL (ref 150–450)
PLATELET ESTIMATE: NORMAL
PMV BLD AUTO: 8.4 FL (ref 6–12)
POTASSIUM SERPL-SCNC: 4.5 MMOL/L (ref 3.7–5.3)
RBC # BLD: 4.57 M/UL (ref 4–5.2)
RBC # BLD: NORMAL 10*6/UL
SEG NEUTROPHILS: 61 % (ref 36–66)
SEGMENTED NEUTROPHILS ABSOLUTE COUNT: 3.6 K/UL (ref 1.3–9.1)
SODIUM BLD-SCNC: 138 MMOL/L (ref 135–144)
TOTAL PROTEIN: 7.8 G/DL (ref 6.4–8.3)
TRIGL SERPL-MCNC: 153 MG/DL
URIC ACID: 8 MG/DL (ref 2.4–5.7)
VLDLC SERPL CALC-MCNC: ABNORMAL MG/DL (ref 1–30)
WBC # BLD: 5.9 K/UL (ref 3.5–11)
WBC # BLD: NORMAL 10*3/UL

## 2021-10-30 PROCEDURE — 83735 ASSAY OF MAGNESIUM: CPT

## 2021-10-30 PROCEDURE — 80053 COMPREHEN METABOLIC PANEL: CPT

## 2021-10-30 PROCEDURE — 83036 HEMOGLOBIN GLYCOSYLATED A1C: CPT

## 2021-10-30 PROCEDURE — 80061 LIPID PANEL: CPT

## 2021-10-30 PROCEDURE — 85025 COMPLETE CBC W/AUTO DIFF WBC: CPT

## 2021-10-30 PROCEDURE — 84100 ASSAY OF PHOSPHORUS: CPT

## 2021-10-30 PROCEDURE — 80197 ASSAY OF TACROLIMUS: CPT

## 2021-10-30 PROCEDURE — 84550 ASSAY OF BLOOD/URIC ACID: CPT

## 2021-10-30 PROCEDURE — 87799 DETECT AGENT NOS DNA QUANT: CPT

## 2021-10-30 PROCEDURE — 36415 COLL VENOUS BLD VENIPUNCTURE: CPT

## 2021-11-01 LAB — PROGRAF: 4.7 NG/ML

## 2021-11-18 ENCOUNTER — OFFICE VISIT (OUTPATIENT)
Dept: GYNECOLOGIC ONCOLOGY | Age: 51
End: 2021-11-18

## 2021-11-18 VITALS
DIASTOLIC BLOOD PRESSURE: 85 MMHG | HEIGHT: 69 IN | SYSTOLIC BLOOD PRESSURE: 125 MMHG | HEART RATE: 102 BPM | OXYGEN SATURATION: 96 % | TEMPERATURE: 96.9 F | WEIGHT: 261 LBS | BODY MASS INDEX: 38.66 KG/M2

## 2021-11-18 DIAGNOSIS — D07.1 VULVAR INTRAEPITHELIAL NEOPLASIA (VIN) GRADE 3: Primary | ICD-10-CM

## 2021-11-18 PROCEDURE — 99024 POSTOP FOLLOW-UP VISIT: CPT | Performed by: OBSTETRICS & GYNECOLOGY

## 2021-11-18 ASSESSMENT — ENCOUNTER SYMPTOMS
RESPIRATORY NEGATIVE: 1
GASTROINTESTINAL NEGATIVE: 1
EYES NEGATIVE: 1

## 2021-11-18 NOTE — PROGRESS NOTES
[address] [place] [address], [facility], [address] [facility] [address]    Son Brown is a 46 y.o. female who presents for a Post Operative visit today following her surgery of 2021    CC: Precancerous lesion of vulva    HPI: Luisana Ojeda is a 61-year-old  3, para 3 female who was seen on 2021 because of recent biopsy revealing DARRELL 3. The patient states that she had noticed a roughening and irritation for over a year. She told her PCP who prescribed Neosporin initially but the lesion did not improve. In 2021 the irritation increased with increasing pain. She was placed on Cipro which did help the pain improve. She was then referred to Dr. Theron Aschoff, OB/GYN who performed colposcopy and a biopsy. Pathology revealed DARRELL 3. He did refer her to [facility] gynecologic oncology for further work-up and management. The patient was a cigarette smoker for several years but has not smoked for 30 years. On 10/5/2021 the patient had surgery and had a large portion of the right labia majora removed. Surrounding areas showed milder areas of dysplasia. These were all treated with the ultrasonic scalpel to approximately 1 cm beyond the visible lesions. Pathology revealed only high-grade dysplasia. There was no evidence of invasion. The wound was closed with 3-0 Vicryl interrupted sutures. Since that time the patient has been using Betadine rinses 2-3 times a day followed by a clindamycin and lidocaine cream.    He is having minimal discomfort. She was seen for her first postoperative visit on 10/21/2021. She has no other complaints. .    ROS:  I have personally reviewed and agree with the review of systems done by my ancillary staff in the [facility] documentation.     Objective:  Vitals:    21 1520 21 1526   BP: (!) 135/90 125/85   Site: Right Upper Arm Right Upper Arm   Position: Sitting Sitting   Cuff Size: Medium Adult Medium Adult Pulse: 102    Temp: 96.9 °F (36.1 °C)    SpO2: 96%    Weight: 261 lb (118.4 kg)    Height: 5' 9\" (1.753 m)        Physical Exam:  General: Patient is in no distress    Pelvic exam:  Inspection of the vulva reveals all sutures are now dissolved. The incision is completely healed primarily. The surrounding areas treated with the ultrasonic scalpel have also reepithelialized. There are no open areas. Assessment:    Extensive DARRELL 3, right vulva and perineum. Post Operative Changes: Very good thus far    Discussed result with patients, and all questions were answered. Plan:    Patient may now gradually resume normal activities. Follow Up Instructions: We will see her again in approximately 3 months for follow-up exam.    She is to call if she notices any new lumps or ulcers during the interim. She is to call if she has any further problems or questions.     Timothy Cannon MD    11/18/2021

## 2021-11-18 NOTE — PATIENT INSTRUCTIONS
Return in about 3-1/2 months for follow-up    Call if you notice any concerning changes during the interim.

## 2022-01-07 ENCOUNTER — OFFICE VISIT (OUTPATIENT)
Dept: PRIMARY CARE CLINIC | Age: 52
End: 2022-01-07
Payer: COMMERCIAL

## 2022-01-07 VITALS
HEART RATE: 99 BPM | SYSTOLIC BLOOD PRESSURE: 128 MMHG | HEIGHT: 69 IN | OXYGEN SATURATION: 97 % | BODY MASS INDEX: 38.86 KG/M2 | WEIGHT: 262.4 LBS | DIASTOLIC BLOOD PRESSURE: 80 MMHG

## 2022-01-07 DIAGNOSIS — M79.672 PAIN IN BOTH FEET: ICD-10-CM

## 2022-01-07 DIAGNOSIS — I10 HYPERTENSION, UNSPECIFIED TYPE: ICD-10-CM

## 2022-01-07 DIAGNOSIS — M79.671 PAIN IN BOTH FEET: ICD-10-CM

## 2022-01-07 DIAGNOSIS — Q61.2 AUTOSOMAL DOMINANT POLYCYSTIC KIDNEY DISEASE: ICD-10-CM

## 2022-01-07 PROCEDURE — 99213 OFFICE O/P EST LOW 20 MIN: CPT | Performed by: FAMILY MEDICINE

## 2022-01-07 ASSESSMENT — PATIENT HEALTH QUESTIONNAIRE - PHQ9
SUM OF ALL RESPONSES TO PHQ QUESTIONS 1-9: 0
2. FEELING DOWN, DEPRESSED OR HOPELESS: 0
1. LITTLE INTEREST OR PLEASURE IN DOING THINGS: 0
SUM OF ALL RESPONSES TO PHQ9 QUESTIONS 1 & 2: 0
SUM OF ALL RESPONSES TO PHQ QUESTIONS 1-9: 0

## 2022-01-07 ASSESSMENT — ENCOUNTER SYMPTOMS
COUGH: 0
SHORTNESS OF BREATH: 0

## 2022-01-07 NOTE — ASSESSMENT & PLAN NOTE
consider gout vs blood flow vs arthritis- watch for color change, uric acid level again, pinpoint place that hurts.

## 2022-01-07 NOTE — PROGRESS NOTES
[address] [facility] PRIMARY CARE  [address] [address]  Dept: [id number] Lupe Ralph is a 46 y.o. female Established patient, who presents today for her medical conditions/complaints as noted below. Chief Complaint   Patient presents with    3 Month Follow-Up     3 month HTN f/u       HPI:     HPI- pt feeling okay. No new issues except knees hurting most days. Also c/o feet hurting, worse when she gets in bed. Not taking anything for it. Reviewed prior notes None  Reviewed previous Labs    LDL Cholesterol (mg/dL)   Date Value   10/30/2021 159 (H)   09/18/2021 138 (H)   05/01/2021 143 (H)       (goal LDL is <100)   AST (U/L)   Date Value   10/30/2021 12     ALT (U/L)   Date Value   10/30/2021 12     BUN (mg/dL)   Date Value   10/30/2021 17     Hemoglobin A1C (%)   Date Value   10/30/2021 5.2     BP Readings from Last 3 Encounters:   01/07/22 128/80   11/18/21 125/85   10/21/21 131/87          (goal 120/80)    Past Medical History:   Diagnosis Date    Abdominal pain 5/29/2010    Acute cystitis 10/21/2015    Acute pancreatitis 10/21/2015    Allergic rhinitis 10/21/2015    Arm pain 10/21/2015    Arm swelling 10/21/2015    Arteriovenous fistula (HCC) 12/12/2014    Arthritis     bilateral knees    Depressive disorder 5/29/2010    Dysphagia 7/9/2010    Dysuria 10/21/2015    Edema 12/12/2014    End-stage renal disease ([facility] [address] 5/29/2010    Fistula 10/21/2015    Gout     Hemodialysis access site with arteriovenous graft ([facility] [address]     Hemodialysis patient ([facility] [address]     [id number].  History of kidney transplant 10/21/2015    History of renal transplant 3/27/2014   Carlos Osler and fatigue 7/9/2010    Palpitations 10/21/2015    Polycystic kidney disease     bilateral. kidney transplant. No longer sees Nephrology.  Sees Urologist @ [facility]    Post-nasal drip 10/21/2015    Restless legs syndrome     Tachycardia 11/24/2010    Vaginal lesion 7/1/2021  Vulvar carcinoma ([facility] [address]     Vulvar edema     Vulvar mass Right 2021      Past Surgical History:   Procedure Laterality Date    AV FISTULA CREATION      AV FISTULA REPAIR      took fistula down/ reversed    GALLBLADDER SURGERY      LAPAROSCOPIC    HERNIA REPAIR      MULIPTLE ABDOMINAL WALL FROM PERITONEAL DIALYSIS AND ABD SURGERIES    HYSTERECTOMY      PARTIAL- BOTH OVARIES STILL THERE    KIDNEY TRANSPLANT      [facility]    MANDIBLE FRACTURE SURGERY      FOR BITE CORRECTION- NO TRAUMA    PARATHYROID GLAND SURGERY  2006    REMOVED 1 GLAND    TOTAL NEPHRECTOMY Bilateral     POLYCYSTIC KIDNEY    VULVECTOMY Right 10/05/2021    partial zack-vulvetomy    VULVECTOMY Right 10/5/2021    RIGHT PARTIAL ZACK-VULVECTOMY, ULTRASONIC SCALPEL TREATMENT performed by Kb Stringer MD at [place] History   Problem Relation Age of Onset    Kidney Disease Mother         polycystic kidney disease.  Heart Disease Mother     High Blood Pressure Father     Diabetes Maternal Grandmother     Other Maternal Grandfather         brain aneurysm    Cancer Maternal Uncle         nonhodgkins lymphoma    Kidney Disease Other         multiple aunts and uncles with polycystic kidney       Social History     Tobacco Use    Smoking status: Former Smoker     Packs/day: 0.25     Years: 5.00     Pack years: 1.25     Types: Cigarettes     Quit date: 1991     Years since quittin.0    Smokeless tobacco: Never Used   Substance Use Topics    Alcohol use: Yes     Comment: very seldom.  few times a year      Current Outpatient Medications   Medication Sig Dispense Refill    tacrolimus (PROGRAF) 1 MG capsule 4 tabs bid plus 0.5 mg bid for total of 4.5 mg bid 60 capsule 0    tacrolimus (PROGRAF) 0.5 MG capsule Take 1 capsule by mouth 2 times daily For total of 4.5 mg bid 60 capsule 0    mycophenolate (MYFORTIC) 180 MG DR tablet Take 360 mg by mouth 2 times daily 2 tablets twice daily      Lidocaine 3 % CREA Apply a thin layer to affected area twice daily (Patient not taking: Reported on 11/18/2021) 85 g 1    silver sulfADIAZINE (SILVADENE) 1 % cream Apply a thin layer to affected area twice daily (Patient not taking: Reported on 11/18/2021) 85 g 1    diclofenac sodium (VOLTAREN) 1 % GEL APPLY 4 G TOPICALLY 4 TIMES DAILY AS NEEDED FOR PAIN ON KNEES (Patient not taking: Reported on 11/18/2021) 100 g 3     No current facility-administered medications for this visit. Allergies   Allergen Reactions    Chloraprep One Step [Chlorhexidine Gluconate] Itching    Morphine Hives     Itching and hives    Seasonal Itching    Cat Hair Extract Itching    Dust Mite Extract Itching       Health Maintenance   Topic Date Due    Hepatitis C screen  Never done    Depression Monitoring  Never done    COVID-19 Vaccine (1) Never done    HIV screen  Never done    Colon cancer screen colonoscopy  Never done    Shingles Vaccine (1 of 2) Never done    Flu vaccine (1) 09/01/2021    Breast cancer screen  04/21/2023    Diabetes screen  10/30/2024    Lipid screen  10/30/2026    DTaP/Tdap/Td vaccine (2 - Td or Tdap) 08/24/2030    Pneumococcal 0-64 years Vaccine (4 of 4 - PPSV23) 08/18/2033    Hepatitis A vaccine  Aged Out    Hepatitis B vaccine  Aged Out    Hib vaccine  Aged Out    Meningococcal (ACWY) vaccine  Aged Out       Subjective:      Review of Systems   Constitutional: Negative for chills and fever. Respiratory: Negative for cough and shortness of breath. Cardiovascular: Negative for chest pain and palpitations. Musculoskeletal: Positive for arthralgias. Neurological: Positive for headaches. Negative for dizziness, light-headedness and numbness. Psychiatric/Behavioral: Negative for sleep disturbance. Objective:     /80   Pulse 99   Ht 5' 9\" (1.753 m)   Wt 262 lb 6.4 oz (119 kg)   SpO2 97%   BMI 38.75 kg/m²   Physical Exam  Vitals and nursing note reviewed.    Constitutional:       General: She is not in acute distress. Appearance: She is well-developed. She is not ill-appearing. HENT:      Head: Normocephalic and atraumatic. Right Ear: External ear normal.      Left Ear: External ear normal.   Eyes:      General: No scleral icterus. Right eye: No discharge. Left eye: No discharge. Conjunctiva/sclera: Conjunctivae normal.      Pupils: Pupils are equal, round, and reactive to light. Neck:      Thyroid: No thyromegaly. Trachea: No tracheal deviation. Cardiovascular:      Rate and Rhythm: Normal rate and regular rhythm. Heart sounds: Normal heart sounds. Pulmonary:      Effort: Pulmonary effort is normal. No respiratory distress. Breath sounds: Normal breath sounds. No wheezing. Lymphadenopathy:      Cervical: No cervical adenopathy. Skin:     General: Skin is warm. Findings: No rash. Neurological:      Mental Status: She is alert and oriented to person, place, and time. Psychiatric:         Mood and Affect: Mood normal.         Behavior: Behavior normal.         Thought Content: Thought content normal.         Assessment/Plan:   1. Pain in both feet  Assessment & Plan:   consider gout vs blood flow vs arthritis- watch for color change, uric acid level again, pinpoint place that hurts. 2. Hypertension, unspecified type  Assessment & Plan:   Well-controlled, continue current medications  3. Autosomal dominant polycystic kidney disease  Assessment & Plan:   s/p transplant- needs to see nephrology- has referral       Return in about 3 months (around 4/7/2022). No orders of the defined types were placed in this encounter. No orders of the defined types were placed in this encounter. Patient given educational materials - see patient instructions. Discussed use, benefit, and side effects of prescribed medications. All patient questions answered. Pt voiced understanding. Reviewed health maintenance.   Instructed to continue current medications, diet and exercise. Patient agreed with treatment plan. Follow up as directed.      Electronically signed by Anisha Morrow MD on 1/7/2022 at 8:24 AM

## 2022-01-15 ENCOUNTER — HOSPITAL ENCOUNTER (OUTPATIENT)
Age: 52
Discharge: HOME | End: 2022-01-15
Payer: COMMERCIAL

## 2022-01-15 LAB
ABSOLUTE EOS #: 0.2 K/UL (ref 0–0.4)
ABSOLUTE IMMATURE GRANULOCYTE: ABNORMAL K/UL (ref 0–0.3)
ABSOLUTE LYMPH #: 1.6 K/UL (ref 1–4.8)
ABSOLUTE MONO #: 0.4 K/UL (ref 0.1–1.3)
ALBUMIN SERPL-MCNC: 3.9 G/DL (ref 3.5–5.2)
ALBUMIN/GLOBULIN RATIO: ABNORMAL (ref 1–2.5)
ALP BLD-CCNC: 80 U/L (ref 35–104)
ALT SERPL-CCNC: 13 U/L (ref 5–33)
ANION GAP SERPL CALCULATED.3IONS-SCNC: 10 MMOL/L (ref 9–17)
AST SERPL-CCNC: 19 U/L
BASOPHILS # BLD: 1 % (ref 0–2)
BASOPHILS ABSOLUTE: 0 K/UL (ref 0–0.2)
BILIRUB SERPL-MCNC: 0.41 MG/DL (ref 0.3–1.2)
BUN BLDV-MCNC: 21 MG/DL (ref 6–20)
BUN/CREAT BLD: ABNORMAL (ref 9–20)
CALCIUM SERPL-MCNC: 9.3 MG/DL (ref 8.6–10.4)
CHLORIDE BLD-SCNC: 105 MMOL/L (ref 98–107)
CHOLESTEROL/HDL RATIO: 5.1
CHOLESTEROL: 224 MG/DL
CO2: 21 MMOL/L (ref 20–31)
CREAT SERPL-MCNC: 0.8 MG/DL (ref 0.5–0.9)
DIFFERENTIAL TYPE: ABNORMAL
EOSINOPHILS RELATIVE PERCENT: 4 % (ref 0–4)
GFR AFRICAN AMERICAN: >60 ML/MIN
GFR NON-AFRICAN AMERICAN: >60 ML/MIN
GFR SERPL CREATININE-BSD FRML MDRD: ABNORMAL ML/MIN/{1.73_M2}
GFR SERPL CREATININE-BSD FRML MDRD: ABNORMAL ML/MIN/{1.73_M2}
GLUCOSE BLD-MCNC: 102 MG/DL (ref 70–99)
HCT VFR BLD CALC: 39.8 % (ref 36–46)
HDLC SERPL-MCNC: 44 MG/DL
HEMOGLOBIN: 13.7 G/DL (ref 12–16)
IMMATURE GRANULOCYTES: ABNORMAL %
LDL CHOLESTEROL: 151 MG/DL (ref 0–130)
LYMPHOCYTES # BLD: 31 % (ref 24–44)
MAGNESIUM: 1.8 MG/DL (ref 1.6–2.6)
MCH RBC QN AUTO: 29.8 PG (ref 26–34)
MCHC RBC AUTO-ENTMCNC: 34.5 G/DL (ref 31–37)
MCV RBC AUTO: 86.3 FL (ref 80–100)
MONOCYTES # BLD: 8 % (ref 1–7)
NRBC AUTOMATED: ABNORMAL PER 100 WBC
PDW BLD-RTO: 13.4 % (ref 11.5–14.9)
PHOSPHORUS: 2.6 MG/DL (ref 2.6–4.5)
PLATELET # BLD: 180 K/UL (ref 150–450)
PLATELET ESTIMATE: ABNORMAL
PMV BLD AUTO: 8.2 FL (ref 6–12)
POTASSIUM SERPL-SCNC: 4.9 MMOL/L (ref 3.7–5.3)
RBC # BLD: 4.61 M/UL (ref 4–5.2)
RBC # BLD: ABNORMAL 10*6/UL
SEG NEUTROPHILS: 56 % (ref 36–66)
SEGMENTED NEUTROPHILS ABSOLUTE COUNT: 2.9 K/UL (ref 1.3–9.1)
SODIUM BLD-SCNC: 136 MMOL/L (ref 135–144)
TOTAL PROTEIN: 7.6 G/DL (ref 6.4–8.3)
TRIGL SERPL-MCNC: 145 MG/DL
URIC ACID: 6.6 MG/DL (ref 2.4–5.7)
VLDLC SERPL CALC-MCNC: ABNORMAL MG/DL (ref 1–30)
WBC # BLD: 5.2 K/UL (ref 3.5–11)
WBC # BLD: ABNORMAL 10*3/UL

## 2022-01-15 PROCEDURE — 80197 ASSAY OF TACROLIMUS: CPT

## 2022-01-15 PROCEDURE — 80061 LIPID PANEL: CPT

## 2022-01-15 PROCEDURE — 84550 ASSAY OF BLOOD/URIC ACID: CPT

## 2022-01-15 PROCEDURE — 84100 ASSAY OF PHOSPHORUS: CPT

## 2022-01-15 PROCEDURE — 87799 DETECT AGENT NOS DNA QUANT: CPT

## 2022-01-15 PROCEDURE — 83036 HEMOGLOBIN GLYCOSYLATED A1C: CPT

## 2022-01-15 PROCEDURE — 80053 COMPREHEN METABOLIC PANEL: CPT

## 2022-01-15 PROCEDURE — 36415 COLL VENOUS BLD VENIPUNCTURE: CPT

## 2022-01-15 PROCEDURE — 83735 ASSAY OF MAGNESIUM: CPT

## 2022-01-15 PROCEDURE — 85025 COMPLETE CBC W/AUTO DIFF WBC: CPT

## 2022-01-16 LAB
ESTIMATED AVERAGE GLUCOSE: 108 MG/DL
HBA1C MFR BLD: 5.4 % (ref 4–6)
PROGRAF: 6 NG/ML

## 2022-02-15 ENCOUNTER — TELEPHONE (OUTPATIENT)
Dept: PRIMARY CARE CLINIC | Age: 52
End: 2022-02-15

## 2022-03-01 ENCOUNTER — TELEPHONE (OUTPATIENT)
Dept: GYNECOLOGIC ONCOLOGY | Age: 52
End: 2022-03-01

## 2022-03-05 ENCOUNTER — HOSPITAL ENCOUNTER (OUTPATIENT)
Age: 52
Discharge: HOME | End: 2022-03-05
Payer: COMMERCIAL

## 2022-03-05 LAB
ABSOLUTE EOS #: 0.2 K/UL (ref 0–0.4)
ABSOLUTE LYMPH #: 1.7 K/UL (ref 1–4.8)
ABSOLUTE MONO #: 0.4 K/UL (ref 0.1–1.3)
ALBUMIN SERPL-MCNC: 4.1 G/DL (ref 3.5–5.2)
ALP BLD-CCNC: 76 U/L (ref 35–104)
ALT SERPL-CCNC: 13 U/L (ref 5–33)
ANION GAP SERPL CALCULATED.3IONS-SCNC: 12 MMOL/L (ref 9–17)
AST SERPL-CCNC: 18 U/L
BASOPHILS # BLD: 0 % (ref 0–2)
BASOPHILS ABSOLUTE: 0 K/UL (ref 0–0.2)
BILIRUB SERPL-MCNC: 0.47 MG/DL (ref 0.3–1.2)
BILIRUBIN DIRECT: 0.08 MG/DL
BILIRUBIN, INDIRECT: 0.39 MG/DL (ref 0–1)
BUN BLDV-MCNC: 16 MG/DL (ref 6–20)
CALCIUM SERPL-MCNC: 8.9 MG/DL (ref 8.6–10.4)
CHLORIDE BLD-SCNC: 104 MMOL/L (ref 98–107)
CHOLESTEROL, FASTING: 212 MG/DL
CHOLESTEROL/HDL RATIO: 4.4
CO2: 21 MMOL/L (ref 20–31)
CREAT SERPL-MCNC: 1 MG/DL (ref 0.5–0.9)
EOSINOPHILS RELATIVE PERCENT: 3 % (ref 0–4)
GFR AFRICAN AMERICAN: >60 ML/MIN
GFR NON-AFRICAN AMERICAN: 58 ML/MIN
GFR SERPL CREATININE-BSD FRML MDRD: ABNORMAL ML/MIN/{1.73_M2}
GLUCOSE BLD-MCNC: 113 MG/DL (ref 70–99)
HCT VFR BLD CALC: 40 % (ref 36–46)
HDLC SERPL-MCNC: 48 MG/DL
HEMOGLOBIN: 13.3 G/DL (ref 12–16)
LDL CHOLESTEROL: 138 MG/DL (ref 0–130)
LYMPHOCYTES # BLD: 28 % (ref 24–44)
MAGNESIUM: 1.7 MG/DL (ref 1.6–2.6)
MCH RBC QN AUTO: 29.6 PG (ref 26–34)
MCHC RBC AUTO-ENTMCNC: 33.3 G/DL (ref 31–37)
MCV RBC AUTO: 88.9 FL (ref 80–100)
MONOCYTES # BLD: 6 % (ref 1–7)
PDW BLD-RTO: 13.6 % (ref 11.5–14.9)
PHOSPHORUS: 2.9 MG/DL (ref 2.6–4.5)
PLATELET # BLD: 177 K/UL (ref 150–450)
PMV BLD AUTO: 8.5 FL (ref 6–12)
POTASSIUM SERPL-SCNC: 5 MMOL/L (ref 3.7–5.3)
RBC # BLD: 4.5 M/UL (ref 4–5.2)
SEG NEUTROPHILS: 63 % (ref 36–66)
SEGMENTED NEUTROPHILS ABSOLUTE COUNT: 3.9 K/UL (ref 1.3–9.1)
SODIUM BLD-SCNC: 137 MMOL/L (ref 135–144)
TOTAL PROTEIN: 6.8 G/DL (ref 6.4–8.3)
TRIGLYCERIDE, FASTING: 132 MG/DL
URIC ACID: 6.8 MG/DL (ref 2.4–5.7)
WBC # BLD: 6.2 K/UL (ref 3.5–11)

## 2022-03-05 PROCEDURE — 82248 BILIRUBIN DIRECT: CPT

## 2022-03-05 PROCEDURE — 84550 ASSAY OF BLOOD/URIC ACID: CPT

## 2022-03-05 PROCEDURE — 80197 ASSAY OF TACROLIMUS: CPT

## 2022-03-05 PROCEDURE — 84100 ASSAY OF PHOSPHORUS: CPT

## 2022-03-05 PROCEDURE — 36415 COLL VENOUS BLD VENIPUNCTURE: CPT

## 2022-03-05 PROCEDURE — 83735 ASSAY OF MAGNESIUM: CPT

## 2022-03-05 PROCEDURE — 85025 COMPLETE CBC W/AUTO DIFF WBC: CPT

## 2022-03-05 PROCEDURE — 80061 LIPID PANEL: CPT

## 2022-03-05 PROCEDURE — 80053 COMPREHEN METABOLIC PANEL: CPT

## 2022-03-07 LAB — PROGRAF: 5.6 NG/ML

## 2022-03-10 ENCOUNTER — HOSPITAL ENCOUNTER (OUTPATIENT)
Age: 52
Setting detail: SPECIMEN
Discharge: HOME | End: 2022-03-10

## 2022-03-10 ENCOUNTER — OFFICE VISIT (OUTPATIENT)
Dept: GYNECOLOGIC ONCOLOGY | Age: 52
End: 2022-03-10
Payer: COMMERCIAL

## 2022-03-10 VITALS
WEIGHT: 264.6 LBS | BODY MASS INDEX: 39.19 KG/M2 | SYSTOLIC BLOOD PRESSURE: 138 MMHG | HEART RATE: 92 BPM | DIASTOLIC BLOOD PRESSURE: 98 MMHG | OXYGEN SATURATION: 98 % | HEIGHT: 69 IN

## 2022-03-10 DIAGNOSIS — N90.3 VULVAR DYSPLASIA: Primary | ICD-10-CM

## 2022-03-10 PROCEDURE — 56821 COLPOSCOPY VULVA W/BIOPSY: CPT | Performed by: PHYSICIAN ASSISTANT

## 2022-03-10 PROCEDURE — 99212 OFFICE O/P EST SF 10 MIN: CPT | Performed by: PHYSICIAN ASSISTANT

## 2022-03-10 NOTE — PATIENT INSTRUCTIONS
We will call with the biopsy results and formulate a treatment plan accordingly    Plan keep biopsy area clean and dry    Follow-up in 3 months unless deemed sooner based on results

## 2022-03-10 NOTE — PROGRESS NOTES
[address] [facility], [facility], [address]     [place] [address]    Chief Complaint: Virginia Camargo is a 46 y.o. female who presents for follow up surveillance for vulvar dysplasia   Chief Complaint   Patient presents with    Follow-up     DARRELL       HPI:   3/10/2022  Daiana Mahmood  46 y.o.  female who had a vulvar biopsy revealing high-grade intraepithelial neoplasia. She initially saw her local OB/GYN provider in 2021 for her annual exam she had concerns of a labial soreness that was present for approximately 2 months. She states she was using Neosporin without relief. She has a surgical history pertinent for hysterectomy secondary to \"heavy menses\". Does state that she had abnormal Pap smears in her 25s and she believes she had to have \"laser therapy\"    Her OB/GYN obtained a vaginal Pap smear which later revealed low-grade CHARIS, HPV negative. She did return for a colposcopy and biopsy of the labial lesion on 2021. The vaginal mucosa was noted to have no abnormal appearances. The lesion of the right labia was biopsied at this time. Vulvar biopsy later returned positive for DARRELL 3 and she was referred to [place] for further evaluation and treatment. Her medical history is significant for polycystic kidney disease and she has a renal transplant receipt currently on long term use of immunosuppressant medication. She is a former smoker, cessation over 30 years ago    She was seen by [facility] gynecology oncology  And ultimately underwent a right natali vulvectomy with a partial vulvectomy using ultrasonic scalpel on 10/6/2021. 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 The margins were treated with ultrasonic scalpel at the time of the excision.     She healed well post operatively and was counseled on close follow up and surveillance with pelvic examinations and colposcopy. Today, she states she is generally feeling well. She is asymptomatic in the vulvar region with no itching, no bleeding or irritation. Does admit to currently being under life and family stressors. Past Medical History:   Diagnosis Date    Abdominal pain 5/29/2010    Acute cystitis 10/21/2015    Acute pancreatitis 10/21/2015    Allergic rhinitis 10/21/2015    Arm pain 10/21/2015    Arm swelling 10/21/2015    Arteriovenous fistula (HCC) 12/12/2014    Arthritis     bilateral knees    Depressive disorder 5/29/2010    Dysphagia 7/9/2010    Dysuria 10/21/2015    Edema 12/12/2014    End-stage renal disease ([facility] [address] 5/29/2010    Fistula 10/21/2015    Gout     Hemodialysis access site with arteriovenous graft ([facility] [address]     Hemodialysis patient ([facility] [address]     [id number].  History of kidney transplant 10/21/2015    History of renal transplant 3/27/2014   Gene Course and fatigue 7/9/2010    Palpitations 10/21/2015    Polycystic kidney disease     bilateral. kidney transplant. No longer sees Nephrology.  Sees Urologist @  [address] drip 10/21/2015    Restless legs syndrome     Tachycardia 11/24/2010    Vaginal lesion 7/1/2021    Vulvar carcinoma ([facility] [address]     Vulvar edema     Vulvar mass Right 8/19/2021         Past Surgical History:   Procedure Laterality Date    AV FISTULA CREATION      AV FISTULA REPAIR      took fistula down/ reversed    GALLBLADDER SURGERY      LAPAROSCOPIC    HERNIA REPAIR      MULIPTLE ABDOMINAL WALL FROM PERITONEAL DIALYSIS AND ABD SURGERIES    HYSTERECTOMY      PARTIAL- BOTH OVARIES STILL THERE    [facility]ANT  2012    [facility]    MANDIBLE FRACTURE SURGERY      FOR BITE CORRECTION- NO TRAUMA    PARATHYROID GLAND SURGERY  2006    REMOVED 1 GLAND    TOTAL NEPHRECTOMY Bilateral     POLYCYSTIC KIDNEY    VULVECTOMY Right 10/05/2021    partial natali-vulvetomy    VULVECTOMY Right 10/5/2021    RIGHT PARTIAL ZACK-VULVECTOMY, ULTRASONIC SCALPEL TREATMENT performed by Kishan Whitney MD at [place] History   Problem Relation Age of Onset    Kidney Disease Mother         polycystic kidney disease.  Heart Disease Mother     High Blood Pressure Father     Diabetes Maternal Grandmother     Other Maternal Grandfather         brain aneurysm    Cancer Maternal Uncle         nonhodgkins lymphoma    Kidney Disease Other         multiple aunts and uncles with polycystic kidney       Social History     Socioeconomic History    Marital status:      Spouse name: Not on file    Number of children: Not on file    Years of education: Not on file    Highest education level: Not on file   Occupational History    Not on file   Tobacco Use    Smoking status: Former Smoker     Packs/day: 0.25     Years: 5.00     Pack years: 1.25     Types: Cigarettes     Quit date: 1991     Years since quittin.2    Smokeless tobacco: Never Used   Vaping Use    Vaping Use: Never used   Substance and Sexual Activity    Alcohol use: Yes     Comment: very seldom. few times a year    Drug use: No    Sexual activity: Yes   Other Topics Concern    Not on file   Social History Narrative    Not on file     Social Determinants of Health     Financial Resource Strain: Low Risk     Difficulty of Paying Living Expenses: Not hard at all   Food Insecurity: No Food Insecurity    Worried About Running Out of Food in the Last Year: Never true    [address] in the Last Year: Never true   Transportation Needs:     Lack of Transportation (Medical): Not on file    Lack of Transportation (Non-Medical):  Not on file   Physical Activity:     Days of Exercise per Week: Not on file    Minutes of Exercise per Session: Not on file   Stress:     Feeling of Stress : Not on file   Social Connections:     Frequency of Communication with Friends and Family: Not on file    Frequency of Social Gatherings with Friends and Exam  Genitourinary:      Rectum normal.      Right Labia: lesions. Right Labia: No tenderness. Left Labia: lesions. Left Labia: No tenderness. Rectum:      No rectal mass or tenderness. IMPRESSIONS: Diffuse low-grade dysplasia spanning bilateral labia minora. There was an area with punctations noted on the right labia minora which may represent more moderate disease. Biopsy sites: The base of the right labia minora. Assessment:   Diagnosis Orders   1. Vulvar dysplasia  Surgical Pathology       PLAN:   1. Await biopsy taken today and follow-up accordingly. We did discuss that if there is any moderate or high-grade dysplasia present then treatment will be recommended. She states that is she were to need treatment she would prefer to have surgery as opposed to topical treatment given the setting of her immunosuppressive state and concern for progressive condition which I do feel would be appropriate given the diffuse surface area of abnormal vulvar mucosa. 2. She is to return to clinic in 3 months or sooner based on results    3. We did discuss she will need repeat Pap smear in August 2022    If patient underwent biopsy, they were instructed to report any increase in vaginal bleeding, discharge, or any temperature more than 100.4 F. Strict pelvic rest precautions were reviewed with lifting restrictions.       Electronically signed by Rebeca Nicole PA-C 3/10/22 9:45 AM

## 2022-03-11 ENCOUNTER — OFFICE VISIT (OUTPATIENT)
Dept: PRIMARY CARE CLINIC | Age: 52
End: 2022-03-11
Payer: COMMERCIAL

## 2022-03-11 VITALS
HEIGHT: 69 IN | BODY MASS INDEX: 38.66 KG/M2 | WEIGHT: 261 LBS | SYSTOLIC BLOOD PRESSURE: 136 MMHG | HEART RATE: 105 BPM | DIASTOLIC BLOOD PRESSURE: 84 MMHG | OXYGEN SATURATION: 97 %

## 2022-03-11 DIAGNOSIS — I10 HYPERTENSION, UNSPECIFIED TYPE: ICD-10-CM

## 2022-03-11 DIAGNOSIS — M25.561 ARTHRALGIA OF BOTH KNEES: ICD-10-CM

## 2022-03-11 DIAGNOSIS — M25.562 ARTHRALGIA OF BOTH KNEES: ICD-10-CM

## 2022-03-11 DIAGNOSIS — F32.A DEPRESSION, UNSPECIFIED DEPRESSION TYPE: ICD-10-CM

## 2022-03-11 DIAGNOSIS — Z94.0 RENAL TRANSPLANT RECIPIENT: ICD-10-CM

## 2022-03-11 PROCEDURE — 99213 OFFICE O/P EST LOW 20 MIN: CPT | Performed by: FAMILY MEDICINE

## 2022-03-11 RX ORDER — MELOXICAM 15 MG/1
15 TABLET ORAL DAILY
Qty: 30 TABLET | Refills: 0 | Status: SHIPPED | OUTPATIENT
Start: 2022-03-11 | End: 2022-04-01 | Stop reason: SDUPTHER

## 2022-03-11 RX ORDER — TRAZODONE HYDROCHLORIDE 50 MG/1
50 TABLET ORAL NIGHTLY
Qty: 30 TABLET | Refills: 3 | Status: SHIPPED | OUTPATIENT
Start: 2022-03-11 | End: 2022-04-01 | Stop reason: SDUPTHER

## 2022-03-11 ASSESSMENT — ENCOUNTER SYMPTOMS
NAUSEA: 0
EYE DISCHARGE: 0
SHORTNESS OF BREATH: 0
COUGH: 0
ABDOMINAL PAIN: 0
RHINORRHEA: 0
SORE THROAT: 0
EYE REDNESS: 0
DIARRHEA: 0
WHEEZING: 0
VOMITING: 0

## 2022-03-11 NOTE — PROGRESS NOTES
[facility] CARE  [address] [address]  [address]pt: [address] Concetta Urrutia is a 46 y.o. female Established patient, who presents today for her medical conditions/complaints as noted below. Chief Complaint   Patient presents with    Knee Pain     patient c/o left knee pain, patient said L knee keeps locking up       HPI:     HPI- left knee gives out on her when she gets up. Can put weight on it but can't pick it up to take a step. Feels like it will give out. Pain in knee behind knee and behind kneecap. Doesn't notice any swelling. Using hot and cold and elevated. Also tried tylenol - maybe helps a little. Having a lot of stress on her system. Vulvar lesion rebiopsied yesterday.   Post covid with her and her parents, new grandbaby premie and other grandbaby due anytime and a lot of stress        Reviewed prior notes gyn  Reviewed previous Labs    LDL Cholesterol (mg/dL)   Date Value   03/05/2022 138 (H)   01/15/2022 151 (H)   10/30/2021 159 (H)       (goal LDL is <100)   AST (U/L)   Date Value   03/05/2022 18     ALT (U/L)   Date Value   03/05/2022 13     BUN (mg/dL)   Date Value   03/05/2022 16     Hemoglobin A1C (%)   Date Value   01/15/2022 5.4     BP Readings from Last 3 Encounters:   03/11/22 (!) 142/92   03/10/22 (!) 138/98   01/07/22 128/80          (goal 120/80)    Past Medical History:   Diagnosis Date    Abdominal pain 5/29/2010    Acute cystitis 10/21/2015    Acute pancreatitis 10/21/2015    Allergic rhinitis 10/21/2015    Arm pain 10/21/2015    Arm swelling 10/21/2015    Arteriovenous fistula (HCC) 12/12/2014    Arthritis     bilateral knees    Depressive disorder 5/29/2010    Dysphagia 7/9/2010    Dysuria 10/21/2015    Edema 12/12/2014    End-stage renal disease ([address] 5/29/2010    Fistula 10/21/2015    Gout     Hemod[facility] with arter[facility]ovenous graft [facility]     Hemodialysis patient ([facility] [id number].  History of kidney transplant 10/21/2015    History of renal transplant 3/27/2014   Caitlin Cave and fatigue 2010    Palpitations 10/21/2015    Polycystic kidney disease     bilateral. kidney transplant. No longer sees Nephrology. Sees Urologist @  [address] drip 10/21/2015    Restless legs syndrome     Tachycardia 2010    Vaginal lesion 2021    Vulvar carcinoma ([address]     Vulvar edema     Vulvar mass Right 2021      Past Surgical History:   Procedure Laterality Date    AV FISTULA CREATION      AV FISTULA REPAIR      took fistula down/ reversed    GALLBLADDER SURGERY      LAPAROSCOPIC    HERNIA REPAIR      MULIPTLE ABDOMINAL WALL FROM PERITONEAL DIALYSIS AND ABD SURGERIES    HYSTERECTOMY      PARTIAL- BOTH OVARIES STILL THERE    KIDNEY TRANSPLANT      [facility]    MANDIBLE FRACTURE SURGERY      FOR BITE CORRECTION- NO TRAUMA    PARATHYROID GLAND SURGERY  2006    REMOVED 1 GLAND    TOTAL NEPHRECTOMY Bilateral     POLYCYSTIC KIDNEY    VULVECTOMY Right 10/05/2021    partial zack-vulvetomy    VULVECTOMY Right 10/5/2021    RIGHT PARTIAL ZACK-VULVECTOMY, ULTRASONIC SCALPEL TREATMENT performed by Eli La MD at [place] History   Problem Relation Age of Onset    Kidney Disease Mother         polycystic kidney disease.  Heart Disease Mother     High Blood Pressure Father     Diabetes Maternal Grandmother     Other Maternal Grandfather         brain aneurysm    Cancer Maternal Uncle         nonhodgkins lymphoma    Kidney Disease Other         multiple aunts and uncles with polycystic kidney       Social History     Tobacco Use    Smoking status: Former Smoker     Packs/day: 0.25     Years: 5.00     Pack years: 1.25     Types: Cigarettes     Quit date: 1991     Years since quittin.2    Smokeless tobacco: Never Used   Substance Use Topics    Alcohol use: Yes     Comment: very seldom.  few times a year      Current Outpatient Medications   Medication Sig Dispense Refill    traZODone (DESYREL) 50 MG tablet Take 1 tablet by mouth nightly 30 tablet 3    meloxicam (MOBIC) 15 MG tablet Take 1 tablet by mouth daily 30 tablet 0    tacrolimus (PROGRAF) 1 MG capsule 4 tabs bid plus 0.5 mg bid for total of 4.5 mg bid 60 capsule 0    tacrolimus (PROGRAF) 0.5 MG capsule Take 1 capsule by mouth 2 times daily For total of 4.5 mg bid 60 capsule 0    mycophenolate (MYFORTIC) 180 MG DR tablet Take 360 mg by mouth 2 times daily 2 tablets twice daily       No current facility-administered medications for this visit. Allergies   Allergen Reactions    Chloraprep One Step [Chlorhexidine Gluconate] Itching    Morphine Hives     Itching and hives    Seasonal Itching    Cat Hair Extract Itching    Dust Mite Extract Itching       Health Maintenance   Topic Date Due    Hepatitis C screen  Never done    COVID-19 Vaccine (1) Never done    HIV screen  Never done    Colorectal Cancer Screen  Never done    Shingles Vaccine (1 of 2) Never done    Flu vaccine (1) 09/01/2021    Depression Monitoring  01/07/2023    Breast cancer screen  04/21/2023    Diabetes screen  01/15/2025    Lipid screen  03/05/2027    DTaP/Tdap/Td vaccine (2 - Td or Tdap) 08/24/2030    Pneumococcal 0-64 years Vaccine (4 of 4 - PPSV23) 08/18/2033    Hepatitis A vaccine  Aged Out    Hepatitis B vaccine  Aged Out    Hib vaccine  Aged Out    Meningococcal (ACWY) vaccine  Aged Out       Subjective:      Review of Systems   Constitutional: Negative for chills and fever. HENT: Negative for rhinorrhea and sore throat. Eyes: Negative for discharge and redness. Respiratory: Negative for cough, shortness of breath and wheezing. Cardiovascular: Negative for chest pain and palpitations. Gastrointestinal: Negative for abdominal pain, diarrhea, nausea and vomiting. Genitourinary: Negative for dysuria and frequency. Musculoskeletal: Positive for arthralgias.  Negative for myalgias. Neurological: Negative for dizziness, light-headedness and headaches. Psychiatric/Behavioral: Positive for sleep disturbance. Objective:     BP (!) 142/92   Pulse 105   Ht 5' 9\" (1.753 m)   Wt 261 lb (118.4 kg)   SpO2 97%   BMI 38.54 kg/m²   Physical Exam  Vitals and nursing note reviewed. Constitutional:       General: She is not in acute distress. Appearance: She is well-developed. She is not ill-appearing. HENT:      Head: Normocephalic and atraumatic. Right Ear: External ear normal.      Left Ear: External ear normal.   Eyes:      General: No scleral icterus. Right eye: No discharge. Left eye: No discharge. Conjunctiva/sclera: Conjunctivae normal.      Pupils: Pupils are equal, round, and reactive to light. Neck:      Thyroid: No thyromegaly. Trachea: No tracheal deviation. Cardiovascular:      Rate and Rhythm: Normal rate and regular rhythm. Heart sounds: Normal heart sounds. Pulmonary:      Effort: Pulmonary effort is normal. No respiratory distress. Breath sounds: Normal breath sounds. No wheezing. Musculoskeletal:      Comments: Some edema of left knee, no severe point tenderness   Lymphadenopathy:      Cervical: No cervical adenopathy. Skin:     General: Skin is warm. Findings: No rash. Neurological:      Mental Status: She is alert and oriented to person, place, and time. Psychiatric:         Mood and Affect: Mood normal.         Behavior: Behavior normal.         Thought Content: Thought content normal.         Assessment/Plan:   1. Hypertension, unspecified type  Assessment & Plan:   Well-controlled, continue current medications  2. Renal transplant recipient  Assessment & Plan:   Well-controlled, continue current medications  Orders:  -     [facility]) - Buzz Davis MD, Nephrology, [place]  3. Arthralgia of both knees  Assessment & Plan:   Well-controlled, continue current medications  4. Depression, unspecified depression type  Assessment & Plan:   trazodone       No follow-ups on file. Orders Placed This Encounter   Procedures    [facility]) - Charo Lang MD, Nephrology, [place]     Referral Priority:   Routine     Referral Type:   Eval and Treat     Referral Reason:   Specialty Services Required     Referred to Provider:   Robby Meade MD     Requested Specialty:   Nephrology     Number of Visits Requested:   1     Orders Placed This Encounter   Medications    traZODone (DESYREL) 50 MG tablet     Sig: Take 1 tablet by mouth nightly     Dispense:  30 tablet     Refill:  3    meloxicam (MOBIC) 15 MG tablet     Sig: Take 1 tablet by mouth daily     Dispense:  30 tablet     Refill:  0       Patient given educational materials - see patient instructions. Discussed use, benefit, and side effects of prescribed medications. All patient questions answered. Pt voiced understanding. Reviewed health maintenance. Instructed to continue current medications, diet and exercise. Patient agreed with treatment plan. Follow up as directed.      Electronically signed by Cesar Levy MD on 3/11/2022 at 2:10 PM

## 2022-03-16 LAB — SURGICAL PATHOLOGY REPORT: NORMAL

## 2022-03-25 ENCOUNTER — TELEPHONE (OUTPATIENT)
Dept: GYNECOLOGIC ONCOLOGY | Age: 52
End: 2022-03-25

## 2022-03-25 NOTE — TELEPHONE ENCOUNTER
Pt called stating she has a lump under the skin where her biopsy was done during last appt. She states that it feels irritated when sitting, and is itchy. She is unable to visualize area, but it is not hot or painful to the touch. Pt asks if she needs to be seen sooner than appt on 3.31.22. Please advise pt.

## 2022-03-25 NOTE — TELEPHONE ENCOUNTER
Returned call to patient and advised use of warm compress to region 2-3 times daily for next 3 days or until resolution of symptoms. Discussed if this area is to become warm or with increasing tenderness or abnormal drainage she can return to clinic for sooner examination. She voiced understanding.

## 2022-03-31 ENCOUNTER — OFFICE VISIT (OUTPATIENT)
Dept: GYNECOLOGIC ONCOLOGY | Age: 52
End: 2022-03-31
Payer: COMMERCIAL

## 2022-03-31 ENCOUNTER — PREP FOR PROCEDURE (OUTPATIENT)
Dept: GYNECOLOGIC ONCOLOGY | Age: 52
End: 2022-03-31

## 2022-03-31 ENCOUNTER — TELEPHONE (OUTPATIENT)
Dept: GYNECOLOGIC ONCOLOGY | Age: 52
End: 2022-03-31

## 2022-03-31 VITALS
BODY MASS INDEX: 38.8 KG/M2 | DIASTOLIC BLOOD PRESSURE: 82 MMHG | HEIGHT: 69 IN | SYSTOLIC BLOOD PRESSURE: 132 MMHG | WEIGHT: 262 LBS | HEART RATE: 105 BPM | OXYGEN SATURATION: 97 %

## 2022-03-31 DIAGNOSIS — Z92.25 H/O IMMUNOSUPPRESSIVE THERAPY: ICD-10-CM

## 2022-03-31 DIAGNOSIS — N90.3 VULVAR DYSPLASIA: Primary | ICD-10-CM

## 2022-03-31 DIAGNOSIS — Z94.0 KIDNEY TRANSPLANTED: ICD-10-CM

## 2022-03-31 PROCEDURE — 99215 OFFICE O/P EST HI 40 MIN: CPT | Performed by: OBSTETRICS & GYNECOLOGY

## 2022-03-31 RX ORDER — SODIUM CHLORIDE 0.9 % (FLUSH) 0.9 %
10 SYRINGE (ML) INJECTION PRN
Status: CANCELLED | OUTPATIENT
Start: 2022-03-31

## 2022-03-31 RX ORDER — SODIUM CHLORIDE 9 MG/ML
INJECTION, SOLUTION INTRAVENOUS CONTINUOUS
Status: CANCELLED | OUTPATIENT
Start: 2022-03-31

## 2022-03-31 RX ORDER — SODIUM CHLORIDE 0.9 % (FLUSH) 0.9 %
10 SYRINGE (ML) INJECTION EVERY 12 HOURS SCHEDULED
Status: CANCELLED | OUTPATIENT
Start: 2022-03-31

## 2022-03-31 RX ORDER — SODIUM CHLORIDE 9 MG/ML
25 INJECTION, SOLUTION INTRAVENOUS PRN
Status: CANCELLED | OUTPATIENT
Start: 2022-03-31

## 2022-03-31 NOTE — TELEPHONE ENCOUNTER
Called and spoke to patient. Her surgery is scheduled for April 4, 2022 at 10:00 am. She is to arrive at 8:00 am. Her COVID testing is scheduled for 4/1/2022 at 10:20 am at the [facility]. Patient voiced understanding and stated she is planning on having labs drawn Friday or Saturday.

## 2022-03-31 NOTE — TELEPHONE ENCOUNTER
Patient returned call to office and willing to have surgery on Monday 4/4. Please call patient back with surgical details.

## 2022-03-31 NOTE — PROGRESS NOTES
[address], [address] [address]    Chief Complaint: Jose Raul Avelar is a 46 y.o. female that presents today for:  Chief Complaint   Patient presents with    Follow-up     Treatment consultation       HPI:  HPI  46 y.o.   3 Para 3 woman, who presents for possible surgical planning. She underwent a right natali vulvectomy with a partial vulvectomy using ultrasonic scalpel on 10/6/21 for biopsy proven VIN3. At her post op appointment \"Diffuse low-grade dysplasia spanning bilateral labia minora. There was an area with punctations noted on the right labia minora which may represent more moderate disease\" was noted. A vulvar biopsy was taken at that time and revealed DARRELL 2-3. Today, surgical vs medical options were discussed. The patient is currently on immunosuppression medications due to prior kidney transplant. Therefore, surgical management is recommended. She is overall feeling well and denies any itching, bleeding, or irritation.      REVIEW OF SYSTEMS:    Constitutional: negative fever, negative chills  HEENT: negative visual disturbances, negative headaches  Respiratory: negative dyspnea, negative cough  Cardiovascular: negative chest pain,  negative palpitations  Gastrointestinal: negative abdominal pain, negative RUQ pain, negative N/V, negative diarrhea, negative constipation  Genitourinary: negative dysuria, negative vaginal discharge, negative vaginal bleeding  Dermatological: negative rash, negative wounds  Hematologic: negative bleeding/clotting disorder  Immunologic: negative recent illness, negative recent sick contact, negative allergic reactions  Lymphatic: negative lymph nodes  Musculoskeletal: negative back pain, negative myalgias, negative arthralgias  Neurological:  negative dizziness, negative weakness  Behavior/Psych: negative depression, negative anxiety      Past Medical History:   Diagnosis Date    Abdominal pain 5/29/2010    Acute cystitis 10/21/2015    Acute pancreatitis 10/21/2015    Allergic rhinitis 10/21/2015    Arm pain 10/21/2015    Arm swelling 10/21/2015    Arteriovenous fistula (HCC) 12/12/2014    Arthritis     bilateral knees    Depressive disorder 5/29/2010    Dysphagia 7/9/2010    Dysuria 10/21/2015    Edema 12/12/2014    End-stage renal disease ([address] 5/29/2010    Fistula 10/21/2015    Gout     Hemodialysis access site with arteriovenous graft ([address]     Hemodialysis patient ([address]     [id number].  History of kidney transplant 10/21/2015    History of renal transplant 3/27/2014   Calros Osler and fatigue 7/9/2010    Palpitations 10/21/2015    Polycystic kidney disease     bilateral. kidney transplant. No longer sees Nephrology. Sees Urologist @  [address] drip 10/21/2015    Restless legs syndrome     Tachycardia 11/24/2010    Vaginal lesion 7/1/2021    Vulvar carcinoma ([address]     Vulvar edema     Vulvar mass Right 8/19/2021       Past Surgical History:   Procedure Laterality Date    AV FISTULA CREATION      AV FISTULA REPAIR      took fistula down/ reversed    GALLBLADDER SURGERY      LAPAROSCOPIC    HERNIA REPAIR      MULIPTLE ABDOMINAL WALL FROM PERITONEAL DIALYSIS AND ABD SURGERIES    HYSTERECTOMY      PARTIAL- BOTH OVARIES STILL THERE    [facility]ANT  [facility]    [facility]    MANDIBLE FRACTURE SURGERY      FOR BITE CORRECTION- NO TRAUMA    PARATHYROID GLAND SURGERY  2006    REMOVED 1 GLAND    TOTAL NEPHRECTOMY Bilateral     POLYCYSTIC KIDNEY    VULVECTOMY Right 10/05/2021    partial natali-vulvetomy    VULVECTOMY Right 10/5/2021    RIGHT PARTIAL NATALI-VULVECTOMY, ULTRASONIC SCALPEL TREATMENT performed by Shavonne Rashid MD at [place] History   Problem Relation Age of Onset    Kidney Disease Mother         polycystic kidney disease.      Heart Disease Mother     High Blood Pressure Father     Diabetes Maternal Grandmother     Other Maternal Grandfather         brain aneurysm    Cancer Maternal Uncle         nonhodgkins lymphoma    Kidney Disease Other         multiple aunts and uncles with polycystic kidney       Social History     Socioeconomic History    Marital status:      Spouse name: None    Number of children: None    Years of education: None    Highest education level: None   Occupational History    None   Tobacco Use    Smoking status: Former Smoker     Packs/day: 0.25     Years: 5.00     Pack years: 1.25     Types: Cigarettes     Quit date: 1991     Years since quittin.2    Smokeless tobacco: Never Used   Vaping Use    Vaping Use: Never used   Substance and Sexual Activity    Alcohol use: Yes     Comment: very seldom. few times a year    Drug use: No    Sexual activity: Yes   Other Topics Concern    None   Social History Narrative    None     Social Determinants of Health     Financial Resource Strain: Low Risk     Difficulty of Paying Living Expenses: Not hard at all   Food Insecurity: No Food Insecurity    Worried About Running Out of Food in the Last Year: Never true    Sushila of Food in the Last Year: Never true   Transportation Needs:     Lack of Transportation (Medical): Not on file    Lack of Transportation (Non-Medical):  Not on file   Physical Activity:     Days of Exercise per Week: Not on file    Minutes of Exercise per Session: Not on file   Stress:     Feeling of Stress : Not on file   Social Connections:     Frequency of Communication with Friends and Family: Not on file    Frequency of Social Gatherings with Friends and Family: Not on file    Attends Anabaptism Services: Not on file    Active Member of Clubs or Organizations: Not on file    Attends Club or Organization Meetings: Not on file    Marital Status: Not on file   Intimate Partner Violence:     Fear of Current or Ex-Partner: Not on file    Emotionally Abused: Not on file    Physically Abused: Not on file   Fadi Safe Sexually Abused: Not on file   Housing Stability:     Unable to Pay for Housing in the Last Year: Not on file    Number of Tia in the Last Year: Not on file    Unstable Housing in the Last Year: Not on file       Past [facility] does contribute to today's presenting complaint. Current Outpatient Medications   Medication Sig Dispense Refill    traZODone (DESYREL) 50 MG tablet Take 1 tablet by mouth nightly (Patient taking differently: Take 50 mg by mouth nightly Takes half of tablet nightly) 30 tablet 3    meloxicam (MOBIC) 15 MG tablet Take 1 tablet by mouth daily (Patient taking differently: Take 15 mg by mouth daily as needed ) 30 tablet 0    tacrolimus (PROGRAF) 1 MG capsule 4 tabs bid plus 0.5 mg bid for total of 4.5 mg bid 60 capsule 0    tacrolimus (PROGRAF) 0.5 MG capsule Take 1 capsule by mouth 2 times daily For total of 4.5 mg bid 60 capsule 0    mycophenolate (MYFORTIC) 180 MG DR tablet Take 360 mg by mouth 2 times daily 2 tablets twice daily       No current facility-administered medications for this visit. Allergies   Allergen Reactions    Chloraprep One Step [Chlorhexidine Gluconate] Itching    Morphine Hives     Itching and hives    Seasonal Itching    Cat Hair Extract Itching    Dust Mite Extract Itching       Vitals:    03/31/22 0837   BP: 132/82   Pulse: 105   SpO2: 97%   Weight: 262 lb (118.8 kg)   Height: 5' 9\" (1.753 m)       Physical Examination:  Physical Exam  Genitourinary:      Rectum normal.         Rectum:      No rectal mass or external hemorrhoid. Impressions: Diffuse aceto-white changes spanning the length of the right labia minora bordering Davis's line. The lesions are raised but not firm or fixed. No evidence of invasive cancer     Assessment:      Diagnosis Orders   1. Vulvar dysplasia            Plan:  1.   Will schedule for Colposcopy and ultrasound scalpel for ablation of Vulva       Follow up: Post op    Electronically signed by Lew Callahan DO on

## 2022-04-01 ENCOUNTER — HOSPITAL ENCOUNTER (OUTPATIENT)
Dept: LAB | Age: 52
Setting detail: SPECIMEN
Discharge: HOME | End: 2022-04-01
Payer: COMMERCIAL

## 2022-04-01 PROCEDURE — U0003 INFECTIOUS AGENT DETECTION BY NUCLEIC ACID (DNA OR RNA); SEVERE ACUTE RESPIRATORY SYNDROME CORONAVIRUS 2 (SARS-COV-2) (CORONAVIRUS DISEASE [COVID-19]), AMPLIFIED PROBE TECHNIQUE, MAKING USE OF HIGH THROUGHPUT TECHNOLOGIES AS DESCRIBED BY CMS-2020-01-R: HCPCS

## 2022-04-01 PROCEDURE — U0005 INFEC AGEN DETEC AMPLI PROBE: HCPCS

## 2022-04-01 RX ORDER — OMEPRAZOLE 20 MG/1
CAPSULE, DELAYED RELEASE ORAL
COMMUNITY

## 2022-04-01 RX ORDER — MELOXICAM 15 MG/1
15 TABLET ORAL PRN
COMMUNITY
End: 2022-05-11

## 2022-04-02 ENCOUNTER — HOSPITAL ENCOUNTER (OUTPATIENT)
Age: 52
Discharge: HOME | End: 2022-04-02
Payer: COMMERCIAL

## 2022-04-02 LAB
ABSOLUTE EOS #: 0.2 K/UL (ref 0–0.4)
ABSOLUTE LYMPH #: 1.6 K/UL (ref 1–4.8)
ABSOLUTE MONO #: 0.3 K/UL (ref 0.1–1.3)
ALBUMIN SERPL-MCNC: 4 G/DL (ref 3.5–5.2)
ALP BLD-CCNC: 72 U/L (ref 35–104)
ALT SERPL-CCNC: 14 U/L (ref 5–33)
ANION GAP SERPL CALCULATED.3IONS-SCNC: 11 MMOL/L (ref 9–17)
AST SERPL-CCNC: 13 U/L
BASOPHILS # BLD: 1 % (ref 0–2)
BASOPHILS ABSOLUTE: 0 K/UL (ref 0–0.2)
BILIRUB SERPL-MCNC: 0.39 MG/DL (ref 0.3–1.2)
BILIRUBIN DIRECT: 0.11 MG/DL
BILIRUBIN, INDIRECT: 0.28 MG/DL (ref 0–1)
BUN BLDV-MCNC: 20 MG/DL (ref 6–20)
CALCIUM SERPL-MCNC: 9.6 MG/DL (ref 8.6–10.4)
CHLORIDE BLD-SCNC: 105 MMOL/L (ref 98–107)
CHOLESTEROL/HDL RATIO: 5
CHOLESTEROL: 231 MG/DL
CO2: 23 MMOL/L (ref 20–31)
CREAT SERPL-MCNC: 0.84 MG/DL (ref 0.5–0.9)
EOSINOPHILS RELATIVE PERCENT: 4 % (ref 0–4)
GFR AFRICAN AMERICAN: >60 ML/MIN
GFR NON-AFRICAN AMERICAN: >60 ML/MIN
GFR SERPL CREATININE-BSD FRML MDRD: ABNORMAL ML/MIN/{1.73_M2}
GLUCOSE BLD-MCNC: 107 MG/DL (ref 70–99)
HCT VFR BLD CALC: 39.3 % (ref 36–46)
HDLC SERPL-MCNC: 46 MG/DL
HEMOGLOBIN: 13.3 G/DL (ref 12–16)
LDL CHOLESTEROL: 159 MG/DL (ref 0–130)
LYMPHOCYTES # BLD: 30 % (ref 24–44)
MAGNESIUM: 1.6 MG/DL (ref 1.6–2.6)
MCH RBC QN AUTO: 29.5 PG (ref 26–34)
MCHC RBC AUTO-ENTMCNC: 33.7 G/DL (ref 31–37)
MCV RBC AUTO: 87.5 FL (ref 80–100)
MONOCYTES # BLD: 6 % (ref 1–7)
PDW BLD-RTO: 13.4 % (ref 11.5–14.9)
PHOSPHORUS: 3 MG/DL (ref 2.6–4.5)
PLATELET # BLD: 194 K/UL (ref 150–450)
PMV BLD AUTO: 7.8 FL (ref 6–12)
POTASSIUM SERPL-SCNC: 4.6 MMOL/L (ref 3.7–5.3)
RBC # BLD: 4.5 M/UL (ref 4–5.2)
SARS-COV-2: NORMAL
SARS-COV-2: NOT DETECTED
SEG NEUTROPHILS: 59 % (ref 36–66)
SEGMENTED NEUTROPHILS ABSOLUTE COUNT: 3.1 K/UL (ref 1.3–9.1)
SODIUM BLD-SCNC: 139 MMOL/L (ref 135–144)
SOURCE: NORMAL
TOTAL PROTEIN: 7.2 G/DL (ref 6.4–8.3)
TRIGL SERPL-MCNC: 130 MG/DL
URIC ACID: 6.6 MG/DL (ref 2.4–5.7)
WBC # BLD: 5.2 K/UL (ref 3.5–11)

## 2022-04-02 PROCEDURE — 84100 ASSAY OF PHOSPHORUS: CPT

## 2022-04-02 PROCEDURE — 80197 ASSAY OF TACROLIMUS: CPT

## 2022-04-02 PROCEDURE — 36415 COLL VENOUS BLD VENIPUNCTURE: CPT

## 2022-04-02 PROCEDURE — 84550 ASSAY OF BLOOD/URIC ACID: CPT

## 2022-04-02 PROCEDURE — 80053 COMPREHEN METABOLIC PANEL: CPT

## 2022-04-02 PROCEDURE — 83735 ASSAY OF MAGNESIUM: CPT

## 2022-04-02 PROCEDURE — 85025 COMPLETE CBC W/AUTO DIFF WBC: CPT

## 2022-04-02 PROCEDURE — 82248 BILIRUBIN DIRECT: CPT

## 2022-04-02 PROCEDURE — 80061 LIPID PANEL: CPT

## 2022-04-04 ENCOUNTER — HOSPITAL ENCOUNTER (OUTPATIENT)
Age: 52
Setting detail: OUTPATIENT SURGERY
Discharge: HOME | End: 2022-04-04
Attending: OBSTETRICS & GYNECOLOGY | Admitting: OBSTETRICS & GYNECOLOGY
Payer: COMMERCIAL

## 2022-04-04 ENCOUNTER — ANESTHESIA (OUTPATIENT)
Dept: OPERATING ROOM | Age: 52
End: 2022-04-04
Payer: COMMERCIAL

## 2022-04-04 ENCOUNTER — ANESTHESIA EVENT (OUTPATIENT)
Dept: OPERATING ROOM | Age: 52
End: 2022-04-04
Payer: COMMERCIAL

## 2022-04-04 VITALS
HEIGHT: 69 IN | HEART RATE: 76 BPM | BODY MASS INDEX: 38.8 KG/M2 | RESPIRATION RATE: 22 BRPM | SYSTOLIC BLOOD PRESSURE: 121 MMHG | TEMPERATURE: 96.8 F | DIASTOLIC BLOOD PRESSURE: 80 MMHG | WEIGHT: 262 LBS | OXYGEN SATURATION: 99 %

## 2022-04-04 VITALS — TEMPERATURE: 95.3 F | SYSTOLIC BLOOD PRESSURE: 86 MMHG | OXYGEN SATURATION: 94 % | DIASTOLIC BLOOD PRESSURE: 65 MMHG

## 2022-04-04 DIAGNOSIS — Z98.890 POST-OPERATIVE STATE: Primary | ICD-10-CM

## 2022-04-04 LAB — PROGRAF: 3.9 NG/ML

## 2022-04-04 PROCEDURE — 2709999900 HC NON-CHARGEABLE SUPPLY: Performed by: OBSTETRICS & GYNECOLOGY

## 2022-04-04 PROCEDURE — 2500000003 HC RX 250 WO HCPCS: Performed by: NURSE ANESTHETIST, CERTIFIED REGISTERED

## 2022-04-04 PROCEDURE — 6360000002 HC RX W HCPCS: Performed by: NURSE ANESTHETIST, CERTIFIED REGISTERED

## 2022-04-04 PROCEDURE — 3600000005 HC SURGERY LEVEL 5 BASE: Performed by: OBSTETRICS & GYNECOLOGY

## 2022-04-04 PROCEDURE — 56620 VULVECTOMY SIMPLE PARTIAL: CPT | Performed by: OBSTETRICS & GYNECOLOGY

## 2022-04-04 PROCEDURE — 2580000003 HC RX 258: Performed by: ANESTHESIOLOGY

## 2022-04-04 PROCEDURE — 3600000015 HC SURGERY LEVEL 5 ADDTL 15MIN: Performed by: OBSTETRICS & GYNECOLOGY

## 2022-04-04 PROCEDURE — 6360000002 HC RX W HCPCS: Performed by: ANESTHESIOLOGY

## 2022-04-04 PROCEDURE — 7100000010 HC PHASE II RECOVERY - FIRST 15 MIN: Performed by: OBSTETRICS & GYNECOLOGY

## 2022-04-04 PROCEDURE — 88305 TISSUE EXAM BY PATHOLOGIST: CPT

## 2022-04-04 PROCEDURE — 7100000000 HC PACU RECOVERY - FIRST 15 MIN: Performed by: OBSTETRICS & GYNECOLOGY

## 2022-04-04 PROCEDURE — 56515 DESTROY VULVA LESION/S COMPL: CPT | Performed by: OBSTETRICS & GYNECOLOGY

## 2022-04-04 PROCEDURE — 3700000001 HC ADD 15 MINUTES (ANESTHESIA): Performed by: OBSTETRICS & GYNECOLOGY

## 2022-04-04 PROCEDURE — 7100000001 HC PACU RECOVERY - ADDTL 15 MIN: Performed by: OBSTETRICS & GYNECOLOGY

## 2022-04-04 PROCEDURE — 2580000003 HC RX 258: Performed by: PHYSICIAN ASSISTANT

## 2022-04-04 PROCEDURE — 3700000000 HC ANESTHESIA ATTENDED CARE: Performed by: OBSTETRICS & GYNECOLOGY

## 2022-04-04 PROCEDURE — 6370000000 HC RX 637 (ALT 250 FOR IP): Performed by: ANESTHESIOLOGY

## 2022-04-04 PROCEDURE — 7100000011 HC PHASE II RECOVERY - ADDTL 15 MIN: Performed by: OBSTETRICS & GYNECOLOGY

## 2022-04-04 RX ORDER — IBUPROFEN 600 MG/1
600 TABLET ORAL EVERY 6 HOURS PRN
Qty: 30 TABLET | Refills: 0 | Status: SHIPPED | OUTPATIENT
Start: 2022-04-04 | End: 2022-04-04 | Stop reason: HOSPADM

## 2022-04-04 RX ORDER — ACETAMINOPHEN 500 MG
1000 TABLET ORAL EVERY 6 HOURS PRN
Qty: 120 TABLET | Refills: 0 | Status: SHIPPED | OUTPATIENT
Start: 2022-04-04 | End: 2022-05-11

## 2022-04-04 RX ORDER — MIDAZOLAM HYDROCHLORIDE 1 MG/ML
INJECTION INTRAMUSCULAR; INTRAVENOUS PRN
Status: DISCONTINUED | OUTPATIENT
Start: 2022-04-04 | End: 2022-04-04 | Stop reason: SDUPTHER

## 2022-04-04 RX ORDER — FENTANYL CITRATE 50 UG/ML
25 INJECTION, SOLUTION INTRAMUSCULAR; INTRAVENOUS EVERY 5 MIN PRN
Status: DISCONTINUED | OUTPATIENT
Start: 2022-04-04 | End: 2022-04-04 | Stop reason: HOSPADM

## 2022-04-04 RX ORDER — SODIUM CHLORIDE 9 MG/ML
INJECTION, SOLUTION INTRAVENOUS CONTINUOUS
Status: DISCONTINUED | OUTPATIENT
Start: 2022-04-04 | End: 2022-04-04 | Stop reason: HOSPADM

## 2022-04-04 RX ORDER — LIDOCAINE HYDROCHLORIDE 10 MG/ML
INJECTION, SOLUTION EPIDURAL; INFILTRATION; INTRACAUDAL; PERINEURAL PRN
Status: DISCONTINUED | OUTPATIENT
Start: 2022-04-04 | End: 2022-04-04 | Stop reason: SDUPTHER

## 2022-04-04 RX ORDER — SODIUM CHLORIDE, SODIUM LACTATE, POTASSIUM CHLORIDE, CALCIUM CHLORIDE 600; 310; 30; 20 MG/100ML; MG/100ML; MG/100ML; MG/100ML
INJECTION, SOLUTION INTRAVENOUS CONTINUOUS
Status: DISCONTINUED | OUTPATIENT
Start: 2022-04-04 | End: 2022-04-04 | Stop reason: HOSPADM

## 2022-04-04 RX ORDER — SODIUM CHLORIDE 0.9 % (FLUSH) 0.9 %
5-40 SYRINGE (ML) INJECTION PRN
Status: DISCONTINUED | OUTPATIENT
Start: 2022-04-04 | End: 2022-04-04 | Stop reason: HOSPADM

## 2022-04-04 RX ORDER — SODIUM CHLORIDE 0.9 % (FLUSH) 0.9 %
10 SYRINGE (ML) INJECTION PRN
Status: DISCONTINUED | OUTPATIENT
Start: 2022-04-04 | End: 2022-04-04 | Stop reason: HOSPADM

## 2022-04-04 RX ORDER — SODIUM CHLORIDE 0.9 % (FLUSH) 0.9 %
5-40 SYRINGE (ML) INJECTION EVERY 12 HOURS SCHEDULED
Status: DISCONTINUED | OUTPATIENT
Start: 2022-04-04 | End: 2022-04-04 | Stop reason: HOSPADM

## 2022-04-04 RX ORDER — DIPHENHYDRAMINE HYDROCHLORIDE 50 MG/ML
12.5 INJECTION INTRAMUSCULAR; INTRAVENOUS
Status: DISCONTINUED | OUTPATIENT
Start: 2022-04-04 | End: 2022-04-04 | Stop reason: HOSPADM

## 2022-04-04 RX ORDER — ONDANSETRON 2 MG/ML
4 INJECTION INTRAMUSCULAR; INTRAVENOUS
Status: DISCONTINUED | OUTPATIENT
Start: 2022-04-04 | End: 2022-04-04 | Stop reason: HOSPADM

## 2022-04-04 RX ORDER — SODIUM CHLORIDE 9 MG/ML
INJECTION, SOLUTION INTRAVENOUS PRN
Status: DISCONTINUED | OUTPATIENT
Start: 2022-04-04 | End: 2022-04-04 | Stop reason: HOSPADM

## 2022-04-04 RX ORDER — PROPOFOL 10 MG/ML
INJECTION, EMULSION INTRAVENOUS PRN
Status: DISCONTINUED | OUTPATIENT
Start: 2022-04-04 | End: 2022-04-04 | Stop reason: SDUPTHER

## 2022-04-04 RX ORDER — FENTANYL CITRATE 50 UG/ML
50 INJECTION, SOLUTION INTRAMUSCULAR; INTRAVENOUS EVERY 5 MIN PRN
Status: DISCONTINUED | OUTPATIENT
Start: 2022-04-04 | End: 2022-04-04 | Stop reason: HOSPADM

## 2022-04-04 RX ORDER — FENTANYL CITRATE 50 UG/ML
25 INJECTION, SOLUTION INTRAMUSCULAR; INTRAVENOUS EVERY 5 MIN PRN
Status: COMPLETED | OUTPATIENT
Start: 2022-04-04 | End: 2022-04-04

## 2022-04-04 RX ORDER — ONDANSETRON 2 MG/ML
INJECTION INTRAMUSCULAR; INTRAVENOUS PRN
Status: DISCONTINUED | OUTPATIENT
Start: 2022-04-04 | End: 2022-04-04 | Stop reason: SDUPTHER

## 2022-04-04 RX ORDER — SODIUM CHLORIDE 9 MG/ML
25 INJECTION, SOLUTION INTRAVENOUS PRN
Status: DISCONTINUED | OUTPATIENT
Start: 2022-04-04 | End: 2022-04-04 | Stop reason: HOSPADM

## 2022-04-04 RX ORDER — FENTANYL CITRATE 50 UG/ML
INJECTION, SOLUTION INTRAMUSCULAR; INTRAVENOUS PRN
Status: DISCONTINUED | OUTPATIENT
Start: 2022-04-04 | End: 2022-04-04 | Stop reason: SDUPTHER

## 2022-04-04 RX ORDER — SODIUM CHLORIDE 0.9 % (FLUSH) 0.9 %
10 SYRINGE (ML) INJECTION EVERY 12 HOURS SCHEDULED
Status: DISCONTINUED | OUTPATIENT
Start: 2022-04-04 | End: 2022-04-04 | Stop reason: HOSPADM

## 2022-04-04 RX ORDER — HYDROCODONE BITARTRATE AND ACETAMINOPHEN 5; 325 MG/1; MG/1
1 TABLET ORAL
Status: COMPLETED | OUTPATIENT
Start: 2022-04-04 | End: 2022-04-04

## 2022-04-04 RX ORDER — DEXAMETHASONE SODIUM PHOSPHATE 10 MG/ML
INJECTION INTRAMUSCULAR; INTRAVENOUS PRN
Status: DISCONTINUED | OUTPATIENT
Start: 2022-04-04 | End: 2022-04-04 | Stop reason: SDUPTHER

## 2022-04-04 RX ORDER — HYDROCODONE BITARTRATE AND ACETAMINOPHEN 5; 325 MG/1; MG/1
1 TABLET ORAL EVERY 4 HOURS PRN
Qty: 10 TABLET | Refills: 0 | Status: SHIPPED | OUTPATIENT
Start: 2022-04-04 | End: 2022-04-11

## 2022-04-04 RX ADMIN — DEXAMETHASONE SODIUM PHOSPHATE 4 MG: 10 INJECTION INTRAMUSCULAR; INTRAVENOUS at 12:34

## 2022-04-04 RX ADMIN — FENTANYL CITRATE 50 MCG: 50 INJECTION, SOLUTION INTRAMUSCULAR; INTRAVENOUS at 13:06

## 2022-04-04 RX ADMIN — FENTANYL CITRATE 50 MCG: 50 INJECTION, SOLUTION INTRAMUSCULAR; INTRAVENOUS at 13:55

## 2022-04-04 RX ADMIN — FENTANYL CITRATE 25 MCG: 50 INJECTION, SOLUTION INTRAMUSCULAR; INTRAVENOUS at 13:50

## 2022-04-04 RX ADMIN — MIDAZOLAM HYDROCHLORIDE 2 MG: 1 INJECTION, SOLUTION INTRAMUSCULAR; INTRAVENOUS at 12:20

## 2022-04-04 RX ADMIN — LIDOCAINE HYDROCHLORIDE 50 MG: 10 INJECTION, SOLUTION EPIDURAL; INFILTRATION; INTRACAUDAL at 12:25

## 2022-04-04 RX ADMIN — SODIUM CHLORIDE, POTASSIUM CHLORIDE, SODIUM LACTATE AND CALCIUM CHLORIDE: 600; 310; 30; 20 INJECTION, SOLUTION INTRAVENOUS at 12:00

## 2022-04-04 RX ADMIN — HYDROCODONE BITARTRATE AND ACETAMINOPHEN 1 TABLET: 5; 325 TABLET ORAL at 14:38

## 2022-04-04 RX ADMIN — FENTANYL CITRATE 50 MCG: 50 INJECTION, SOLUTION INTRAMUSCULAR; INTRAVENOUS at 12:33

## 2022-04-04 RX ADMIN — PROPOFOL 300 MG: 10 INJECTION, EMULSION INTRAVENOUS at 12:25

## 2022-04-04 RX ADMIN — FENTANYL CITRATE 50 MCG: 50 INJECTION, SOLUTION INTRAMUSCULAR; INTRAVENOUS at 13:27

## 2022-04-04 RX ADMIN — FENTANYL CITRATE 25 MCG: 50 INJECTION, SOLUTION INTRAMUSCULAR; INTRAVENOUS at 13:45

## 2022-04-04 RX ADMIN — ONDANSETRON 4 MG: 2 INJECTION INTRAMUSCULAR; INTRAVENOUS at 13:14

## 2022-04-04 RX ADMIN — SODIUM CHLORIDE: 9 INJECTION, SOLUTION INTRAVENOUS at 08:59

## 2022-04-04 RX ADMIN — FENTANYL CITRATE 50 MCG: 50 INJECTION, SOLUTION INTRAMUSCULAR; INTRAVENOUS at 12:47

## 2022-04-04 RX ADMIN — FENTANYL CITRATE 50 MCG: 50 INJECTION, SOLUTION INTRAMUSCULAR; INTRAVENOUS at 12:43

## 2022-04-04 RX ADMIN — FENTANYL CITRATE 50 MCG: 50 INJECTION, SOLUTION INTRAMUSCULAR; INTRAVENOUS at 12:53

## 2022-04-04 ASSESSMENT — PULMONARY FUNCTION TESTS
PIF_VALUE: 10
PIF_VALUE: 2
PIF_VALUE: 15
PIF_VALUE: 12
PIF_VALUE: 13
PIF_VALUE: 14
PIF_VALUE: 15
PIF_VALUE: 21
PIF_VALUE: 0
PIF_VALUE: 13
PIF_VALUE: 15
PIF_VALUE: 15
PIF_VALUE: 10
PIF_VALUE: 10
PIF_VALUE: 16
PIF_VALUE: 14
PIF_VALUE: 13
PIF_VALUE: 13
PIF_VALUE: 15
PIF_VALUE: 15
PIF_VALUE: 13
PIF_VALUE: 13
PIF_VALUE: 15
PIF_VALUE: 15
PIF_VALUE: 10
PIF_VALUE: 13
PIF_VALUE: 12
PIF_VALUE: 15
PIF_VALUE: 14
PIF_VALUE: 16
PIF_VALUE: 13
PIF_VALUE: 15
PIF_VALUE: 1
PIF_VALUE: 15
PIF_VALUE: 14
PIF_VALUE: 1
PIF_VALUE: 13
PIF_VALUE: 0
PIF_VALUE: 14
PIF_VALUE: 11
PIF_VALUE: 15
PIF_VALUE: 13
PIF_VALUE: 14
PIF_VALUE: 6
PIF_VALUE: 6
PIF_VALUE: 1
PIF_VALUE: 14
PIF_VALUE: 12
PIF_VALUE: 15
PIF_VALUE: 15
PIF_VALUE: 1
PIF_VALUE: 16
PIF_VALUE: 14
PIF_VALUE: 2
PIF_VALUE: 2

## 2022-04-04 ASSESSMENT — PAIN SCALES - GENERAL
PAINLEVEL_OUTOF10: 7
PAINLEVEL_OUTOF10: 0
PAINLEVEL_OUTOF10: 0
PAINLEVEL_OUTOF10: 7
PAINLEVEL_OUTOF10: 6

## 2022-04-04 ASSESSMENT — PAIN DESCRIPTION - LOCATION: LOCATION: OTHER (COMMENT)

## 2022-04-04 ASSESSMENT — PAIN - FUNCTIONAL ASSESSMENT: PAIN_FUNCTIONAL_ASSESSMENT: 0-10

## 2022-04-04 NOTE — ANESTHESIA POSTPROCEDURE EVALUATION
Department of Anesthesiology  Postprocedure Note    Patient: Merlinda Goon  MRN: [id number]  YOB: 1970  Date of evaluation: 4/4/2022  Time:  1:49 PM     Procedure Summary     Date: 04/04/22 Room / Location: [address]    Anesthesia Start: 3923 Anesthesia Stop: 8814    Procedure: COLPOSCOPY, ULTRASONIC SCAPEL ABLATION OF VULVA (N/A ) Diagnosis: (VULVAR DYSPLASIA)    Surgeons: Melyssa Muniz MD Responsible Provider: Deretha Habermann, MD    Anesthesia Type: general ASA Status: 3          Anesthesia Type: general    Nate Phase I:      Nate Phase II:      Last vitals: Reviewed and per EMR flowsheets.        Anesthesia Post Evaluation    Patient location during evaluation: PACU  Patient participation: complete - patient participated  Level of consciousness: awake  Pain score: 1  Airway patency: patent  Nausea & Vomiting: no nausea and no vomiting  Complications: no  Cardiovascular status: blood pressure returned to baseline and hemodynamically stable  Respiratory status: acceptable  Hydration status: euvolemic

## 2022-04-04 NOTE — BRIEF OP NOTE
Brief Operative Note  Department of Obstetrics and Gynecology  [facility]     Patient: Rachel Navarro   : 1970  MRN: [id number]       Acct: [de-identified]   Date of Procedure: 4/3/22     Pre-operative Diagnosis: 46 y.o. female    Vulvar dysplasia (DARRELL 2-3)   Perineal cyst  Post-operative Diagnosis: Same, Perineal Hematoma     Procedure: Cystoscopy with ultrasonic scalpel ablation of vulva, I&D of perianal hematoma    Surgeon: Dr. João Wagoner     Assistant(s):  Osmel Torres DO, PGY3; [facility]    Anesthesia: general     Findings: Grossly normal appearing external genitalia and vaginal mucosa, large rectocele, diffuse vulvar dysplasia noted on right labia minora, no invasive disease noted  Total IV fluids/Blood products:  400 ml crystalloid  Urine Output:  NA ml    Estimated blood loss:  NA  Drains:  none  Specimens: Vulvar epithelium  Instrument and Sponge Count: Correct  Complications:  none  Condition:  stable, transferred to post anesthesia recovery      Osmel Torres DO  Ob/Gyn Resident  Pager: [phone]  2022, 1:26 PM

## 2022-04-04 NOTE — H&P
OB/GYN Pre-Op H&P  [facility]    Patient Name: Avni Disla     Patient : 1970  Room/Bed: Kathia /NONE  Admission Date/Time: 2022  7:50 AM  Primary Care Physician: Raquel Banuelos MD  MRN: [id number]    Date: 2022  Time: 11:45 AM    The patient was seen in pre-op holding. She is here for Colposcopy, Ultrasonic Scalpel ablation of Vulva. She had a vulvar biopsy after an area of \"Diffuse low-grade dysplasia spanning bilateral labia minora. Sandy García was an area with punctations noted on the right labia minora which may represent more moderate disease\" was seen at her follow up appointment. This biopsy revealed DARRELL 2-3. She had a prior natali vulvectomy for VIN3. Today, she has no concerns. The procedure risks and complications were reviewed. The labs, Consent, and H&P were reviewed and updated. The patient was counseled on the possibility of  the need of a second surgery. The patient voiced understanding and had all of her questions answered. The possibility of incomplete removal of abnormal tissue was discussed. OBSTETRICAL HISTORY:   OB History   No obstetric history on file. PAST MEDICAL HISTORY:   has a past medical history of Abdominal pain, Acute cystitis, Acute pancreatitis, Arm pain, Arm swelling, Arteriovenous fistula (HCC), Arthritis, Depressive disorder, Dysphagia, Dysuria, End-stage renal disease ([address] Fistula, Gout, Hemodialysis patient ([address] History of kidney transplant, History of renal transplant, Malaise and fatigue, Polycystic kidney disease, Restless legs syndrome, Sleep apnea, Tachycardia, Vaginal lesion, Vulvar carcinoma ([address] Vulvar mass Right, and Wellness examination. PAST SURGICAL HISTORY:   has a past surgical history that includes Mandible fracture surgery; total nephrectomy (Bilateral); Parathyroid gland surgery (); Gallbladder surgery; hernia repair; Hysterectomy; Kidney transplant ();  AV fistula creation; AV fistula repair; and Vulvectomy (Right, 10/5/2021). ALLERGIES:  Allergies as of 03/31/2022 - Fully Reviewed 03/31/2022   Allergen Reaction Noted    Chloraprep one step [chlorhexidine gluconate] Itching 10/21/2015    Morphine Hives 10/21/2015    Seasonal Itching 09/20/2019    Cat hair extract Itching 09/20/2019    Dust mite extract Itching 10/21/2015       MEDICATIONS:  Current Facility-Administered Medications   Medication Dose Route Frequency Provider Last Rate Last Admin    0.9 % sodium chloride infusion   IntraVENous Continuous Delia Alexy, PA-C 125 mL/hr at 04/04/22 0859 New Bag at 04/04/22 0859    0.9 % sodium chloride infusion  25 mL IntraVENous PRN Delia Alexy, PA-C        sodium chloride flush 0.9 % injection 10 mL  10 mL IntraVENous 2 times per day Genesis Rodríguez, PA-C        sodium chloride flush 0.9 % injection 10 mL  10 mL IntraVENous PRN Delia Alexy, PA-C        sodium chloride flush 0.9 % injection 5-40 mL  5-40 mL IntraVENous 2 times per day Keenan Ibrahim MD        sodium chloride flush 0.9 % injection 5-40 mL  5-40 mL IntraVENous PRN Keenan Ibrahim MD        0.9 % sodium chloride infusion   IntraVENous PRN Keenan Ibrahim MD        fentaNYL (SUBLIMAZE) injection 25 mcg  25 mcg IntraVENous Q5 Min PRN Keenan Ibrahim MD        fentaNYL (SUBLIMAZE) injection 50 mcg  50 mcg IntraVENous Q5 Min PRN Keenan Ibrahim MD           FAMILY HISTORY:  family history includes Cancer in her maternal uncle; Diabetes in her maternal grandmother; Heart Disease in her mother; High Blood Pressure in her father; Kidney Disease in her mother and another family member; Other in her maternal grandfather. SOCIAL HISTORY:   reports that she quit smoking about 31 years ago. Her smoking use included cigarettes. She has a 1.25 pack-year smoking history. She has never used smokeless tobacco. She reports current alcohol use. She reports that she does not use drugs.     VITALS:  Vitals:    04/01/22 1158 04/04/22 0840   BP:  (!) 141/87   Pulse:  73   Resp:  18   Temp:  96.8 °F (36 °C)   TempSrc:  Temporal   SpO2:  97%   Weight: 262 lb (118.8 kg) 262 lb (118.8 kg)   Height: 5' 9\" (1.753 m) 5' 9\" (1.753 m)                                                                                                                               PHYSICAL EXAM:     Unchanged from Prior H&P  CONSTITUTIONAL:  Alert and oriented, no acute distress  HEAD: normocephalic, atraumatic  EYES: Pupils equal and reactive to light, Extraocular muscles intact, sclera non icteric  ENT: Mucus membranes moist, No otorrhea, no rhinorrhea  NECK:  supple, symmetrical, trachea midline   LUNGS:  Good air movement bilaterally, unlabored respirations, no wheezes or rhonchi  CARDIOVASCULAR: Regular rate and rhythm, no murmurs rubs or gallops  ABDOMEN: soft, non tender, non distended, no rebound or guarding, no hernias, no hepatomegaly, no splenomegly  MUSCULOSKELETAL:  Equal strength bilaterally, normal muscle tone  SKIN: No abscess or rash  NEUROLOGIC:  Cranial nerves 2-12 grossly intact, no focal deficits  PSYCH: affect appropriate  Pelvic Exam: deferred to OR      LAB RESULTS:  Hospital Outpatient Visit on 04/02/2022   Component Date Value Ref Range Status    WBC 04/02/2022 5.2  3.5 - 11.0 k/uL Final    RBC 04/02/2022 4.50  4.0 - 5.2 m/uL Final    Hemoglobin 04/02/2022 13.3  12.0 - 16.0 g/dL Final    Hematocrit 04/02/2022 39.3  36 - 46 % Final    MCV 04/02/2022 87.5  80 - 100 fL Final    MCH 04/02/2022 29.5  26 - 34 pg Final    MCHC 04/02/2022 33.7  31 - 37 g/dL Final    RDW 04/02/2022 13.4  11.5 - 14.9 % Final    Platelets 93/04/7520 194  150 - 450 k/uL Final    MPV 04/02/2022 7.8  6.0 - 12.0 fL Final    Seg Neutrophils 04/02/2022 59  36 - 66 % Final    Lymphocytes 04/02/2022 30  24 - 44 % Final    Monocytes 04/02/2022 6  1 - 7 % Final    Eosinophils % 04/02/2022 4  0 - 4 % Final    Basophils 04/02/2022 1  0 - 2 % Final    Segs Absolute 0 - 130 mg/dL Final    Comment:    LDL Guidelines:     <100    Desirable   100-129   Near to/above Desirable   130-159   Borderline      >159   Undesirable     Direct (measured) LDL and calculated LDL are not interchangeable tests.  Chol/HDL Ratio 04/02/2022 5.0* <5 Final            Triglycerides 04/02/2022 130  <150 mg/dL Final    Comment:    Triglyceride Guidelines:     <150   Desirable   150-199  Borderline   200-499  High     >499   Very high   Based on AHA Guidelines for fasting triglyceride, October 2012.  Magnesium 04/02/2022 1.6  1.6 - 2.6 mg/dL Final    Phosphorus 04/02/2022 3.0  2.6 - 4.5 mg/dL Final    Uric Acid 04/02/2022 6.6* 2.4 - 5.7 mg/dL Final    Prograf 04/02/2022 3.9  ng/mL Final    Comment: (NOTE)  Therapeutic Range:  Kidney transplant:  0-3 months post-transplant: 7.0-20.0 ng/mL  3 months and older: 5.0-15.0 ng/mL  Liver transplant:  1-12 months post-transplant: 5-20 ng/mL  Heart transplant:  0-3 months post-transplant: 10.0-20.0 ng/mL  3 months and older: 5.0-15.0 ng/mL  Toxic value: Greater than 25 ng/mL  Therapeutic range is based on a whole blood specimen drawn 12   hours post-dose or prior to next dose (the trough). The optimal   therapeutic range for a given patient may differ from this   suggested range based on the indication for therapy, treatment   phase (initiation or maintenance), use in combination with other   drugs, time of specimen collection relative to prior dose, type of   transplanted organ, and/or the therapeutic approach of the   transplant center. This test was developed and its performance characteristics   determined by [facility]. It has not been cleared or   approved by the [employer] and Drug Administration. This test was   performed in                            a CLIA certified laboratory and is intended for   clinical purposes. Performed By: [facility] [address]  [place], [address] [facility]  : Rahul Ribera, MD     Hospital Outpatient Visit on 04/01/2022   Component Date Value Ref Range Status    SARS-CoV-2 04/01/2022        Final    Source 04/01/2022 . NASOPHARYNGEAL SWAB   Final    SARS-CoV-2 04/01/2022 Not Detected  Not Detected Final    Comment:       The specimen is NEGATIVE for SARS-CoV-2, the novel coronavirus associated with COVID-19. A negative result does not rule out COVID-19. Russell SARS-CoV-2 for use on the [employer] is a real-time RT-PCR test intended   for the qualitative detection of nucleic acids from SARS-CoV-2  in clinician-collected nasal, nasopharyngeal, and oropharyngeal swab specimens from   individuals who meet COVID-19 clinical and/or epidemiological criteria. Russell SARS-CoV-2 is for use only under Emergency Use Authorization (EUA) in laboratories   certified under [address] (CLIA), 42 U. S.C. §088R,   that meet requirements to perform high or moderate complexity tests. An individual without symptoms of COVID-19 and who is not shedding SARS-CoV-2 virus would   expect to have a negative (not detected) result in this assay. Fact sheet for Healthcare Providers: [employer]es  Fact sheet for Patients: https://www.                           fda.gov/media/761474/download        METHODOLOGY: RT-PCR     Hospital Outpatient Visit on 03/10/2022   Component Date Value Ref Range Status    Surgical Pathology Report 03/10/2022    Final                    Value:-- Diagnosis --  BASE OF RIGHT LABIA MINORA, BIOPSY:  -HIGH-GRADE SQUAMOUS INTRAEPITHELIAL LESION (DARRELL 2-3).       Michael Kennedy M.D.  **Electronically Signed Out**         abdifatah/3/14/2022       Clinical Information  Pre-op Diagnosis:  VULVAR DYSPLASIA    Operative Findings:  BIOPSY BASE RIGHT LABIA MINORA    Source of Specimen  A: BASE OF RIGHT LABIA MINORA BIOPSY    Gross Description  \"FABRICIO NICHOLS, BASE OF RIGHT LABIA MINORA BIOPSY\" 0.2 x 0.2 x 0.1 cm  tan-white fragment. Inked intact 1cs. tm      Microscopic Description  Microscopic examination performed. Sections show squamous mucosa with  atypical dysplastic features to the squamous cells that extend close  to the surface of the epithelium. P16 is performed and is negative  (no strong block staining). P53 shows patchy variable staining and  the Ki-67 shows basal to middle third of the epithelium. Controls  react as expected. For , slides also reviewed by additional pathologists  (Ashly Francis, jose POTTER), who concur with the diagnosis. SURGICAL PATHOLOGY CONSULTATION       Patient Name: Vito Diaz Med Rec: [id number]  Path Number: [id number]    [address] [place] [address], [facility]. [place], [address]  [phone]  Fax: [phone]           DIAGNOSIS & PLAN:  1. Vulvar dysplasia (DARRELL 2-3)   - Proceed with planned procedure: Colposcopy, Ultrasonic Scalpel ablation of Vulva    - Consent signed, on chart. - The patient is ready for transport to the operative suite. Counseling: The patient was counseled on all options both medical and surgical, conservative as well as definitive. She has elected to proceed with the procedure as stated above. The patient was counseled on the procedure. Risks and complications were reviewed in detail. The patients orders, labs, consents have been completed. The history and physical as well as all supporting surgical documentation will be forwarded to the pre-operative holding area. The patient is aware that this procedure may not alleviate her symptoms. That there may be a necessity for a second surgery and that there may be an incomplete removal of abnormal tissue.     Ruben Vela, DO  Ob/Gyn Resident  Pager: [phone] [facility]  4/4/2022, 11:45 AM

## 2022-04-04 NOTE — PROGRESS NOTES
Discussed  with pharmacist socorro here at [facility]'s -discussed with her and patient taking norco -patient due to morphine allergy with hives -patient thinks just hives on side of iv -will keep patient here-45 minutes to 1 hour to observe per pharmacist's recommendations / updated on status and at bedside

## 2022-04-04 NOTE — PROGRESS NOTES
Discharge instructions to patient and spouse -voices understanding/picked meds up at [facility]

## 2022-04-04 NOTE — PROGRESS NOTES
[address] [facility] Attending Note      Patient seen and evaluated by me today. History, pathology, notes, records revd once more by me today. She tells me that she fully understands the operation and all of the risks and downstream consequences. She tells me today that all of her questions are answered to her satisfaction today. Proceed to OR today as scheduled.     Malachi Carrillo MD

## 2022-04-04 NOTE — ANESTHESIA PRE PROCEDURE
Department of Anesthesiology  Preprocedure Note       Name:  Dorie Hsu   Age:  46 y.o.  :  1970                                          MRN:  [id number]         Date:  2022      Surgeon: Giselle Carrillo):  Brian Navarro MD    Procedure: Procedure(s):  COLPOSCOPY, ULTRASONIC SCAPEL ABLATION OF VULVA    Medications prior to admission:   Prior to Admission medications    Medication Sig Start Date End Date Taking? Authorizing Provider   meloxicam (MOBIC) 15 MG tablet Take 15 mg by mouth as needed for Pain    Historical Provider, MD   traZODone (DESYREL) 25 mg TABS Take 25 mg by mouth nightly    Historical Provider, MD   omeprazole (PRILOSEC) 20 MG delayed release capsule Prilosec 20 mg capsule,delayed release   Take 1 capsule as needed by oral route. Historical Provider, MD   Acetaminophen (TYLENOL PO) Take by mouth as needed    Historical Provider, MD   tacrolimus (PROGRAF) 1 MG capsule 4 tabs bid plus 0.5 mg bid for total of 4.5 mg bid 19   Antonia Calderón MD   tacrolimus (PROGRAF) 0.5 MG capsule Take 1 capsule by mouth 2 times daily For total of 4.5 mg bid 19   Antonia Calderón MD   mycophenolate (MYFORTIC) 180 MG DR tablet Take 360 mg by mouth 2 times daily 2 tablets twice daily    Historical Provider, MD       Current medications:    No current facility-administered medications for this visit. No current outpatient medications on file. Facility-Administered Medications Ordered in Other Visits   Medication Dose Route Frequency Provider Last Rate Last Admin    0.9 % sodium chloride infusion   IntraVENous Continuous Delia Alexy, PA-C        0.9 % sodium chloride infusion  25 mL IntraVENous PRN Delia Alexy, PA-C        sodium chloride flush 0.9 % injection 10 mL  10 mL IntraVENous 2 times per day [facility], PA-PI[facility]        sodium chloride flush 0.9 % injection 10 mL  10 mL IntraVENous PRN Delia Alexy, PA-C           Allergies:     Allergies   Allergen Reactions  Chloraprep One Step [Chlorhexidine Gluconate] Itching    Morphine Hives     Itching and hives    Seasonal Itching    Cat Hair Extract Itching    Dust Mite Extract Itching       Problem List:    Patient Active Problem List   Diagnosis Code    Carotid artery stenosis I65.29    Depression F32. A    Headache R51.9    Hyperlipidemia E78.5    Hypertension I10    Renal transplant recipient Z80.0    Autosomal dominant polycystic kidney disease Q61.2    Arthralgia of multiple joints M25.50    Arthralgia of both knees M25.561, M25.562    Carotid artery occlusion I65.29    Hypertensive heart and kidney disease I13.10    Sleep apnea G47.30    Vulvar intraepithelial neoplasia (DARRELL) grade 3 D07.1    S/p right hemivulvectomy, treatment of DARRELL 3 with Ultrasonic scalpel (10/5/21) Z90.79    H/O right hemicolectomy Z90.49    Pain in both feet M79.671, M79.672       Past Medical History:        Diagnosis Date    Abdominal pain 5/29/2010    Acute cystitis 10/21/2015    Acute pancreatitis 10/21/2015    Arm pain 10/21/2015    Arm swelling 10/21/2015    [facility] 12/12/2014    Arthritis     bilateral knees    Depressive disorder 5/29/2010    Dysphagia 7/9/2010    Dysuria 10/21/2015    End-stage renal disease ([facility] 5/29/2010    Fistula 10/21/2015    Gout     Hemodialysis patient ([facility]     [id number].  History of kidney transplant 10/21/2015    History of renal transplant 3/27/2014   Phoebe Ego and fatigue 7/9/2010    Polycystic kidney disease     bilateral. kidney transplant. No longer sees Nephrology.  Sees Urologist @ [facility]    Restless legs syndrome     Sleep apnea     no machine    Tachycardia 11/24/2010    Vaginal lesion 7/1/2021    Vulvar carcinoma (HCC)     Vulvar mass Right 8/19/2021    Wellness examination     Dr. Dixie Jean Baptiste       Past Surgical History:        Procedure Laterality Date    AV FISTULA CREATION      AV FISTULA REPAIR      took fistula down/ reversed  GALLBLADDER SURGERY      LAPAROSCOPIC    HERNIA REPAIR      MULIPTLE ABDOMINAL WALL FROM PERITONEAL DIALYSIS AND ABD SURGERIES    HYSTERECTOMY      PARTIAL- BOTH OVARIES STILL THERE    KID[facility] TRANSPLANT      [facility]    MANDIBLE FRACTURE SURGERY      FOR BITE CORRECTION- NO TRAUMA    PARATHYROID GLAND SURGERY  2006    REMOVED 1 GLAND    TOTAL NEPHRECTOMY Bilateral     POLYCYSTIC KIDNEY    VULVECTOMY Right 10/5/2021    RIGHT PARTIAL ZACK-VULVECTOMY, ULTRASONIC SCALPEL TREATMENT performed by Saravanan William MD at [facility] History:    Social History     Tobacco Use    Smoking status: Former Smoker     Packs/day: 0.25     Years: 5.00     Pack years: 1.25     Types: Cigarettes     Quit date: 1991     Years since quittin.2    Smokeless tobacco: Never Used   Substance Use Topics    Alcohol use: Yes     Comment: very seldom. few times a year                                Counseling given: Not Answered      Vital Signs (Current): There were no vitals filed for this visit.                                            BP Readings from Last 3 Encounters:   22 132/82   22 136/84   03/10/22 (!) 138/98       NPO Status:                                                                                 BMI:   Wt Readings from Last 3 Encounters:   22 262 lb (118.8 kg)   22 262 lb (118.8 kg)   22 261 lb (118.4 kg)     There is no height or weight on file to calculate BMI.    CBC:   Lab Results   Component Value Date    WBC 5.2 2022    RBC 4.50 2022    RBC 4.44 2020    HGB 13.3 2022    HCT 39.3 2022    MCV 87.5 2022    RDW 13.4 2022     2022       CMP:   Lab Results   Component Value Date     2022    K 4.6 2022     2022    CO2 23 2022    BUN 20 2022    CREATININE 0.84 2022    GFRAA >60 2022    LABGLOM >60 2022    GLUCOSE 107 2022    GLUCOSE 105 2020 PROT 7.2 04/02/2022    PROT 7.0 03/07/2020    CALCIUM 9.6 04/02/2022    BILITOT 0.39 04/02/2022    ALKPHOS 72 04/02/2022    AST 13 04/02/2022    ALT 14 04/02/2022       POC Tests: No results for input(s): POCGLU, POCNA, POCK, POCCL, POCBUN, POCHEMO, POCHCT in the last 72 hours. Coags: No results found for: PROTIME, INR, APTT    HCG (If Applicable): No results found for: PREGTESTUR, PREGSERUM, HCG, HCGQUANT     ABGs: No results found for: PHART, PO2ART, ALT0RDB, GSC9KJW, BEART, T5MBIYTC     Type & Screen (If Applicable):  No results found for: LABABO, LABRH    Drug/Infectious Status (If Applicable):  No results found for: HIV, HEPCAB    COVID-19 Screening (If Applicable):   Lab Results   Component Value Date    COVID19 Not Detected 04/01/2022           Anesthesia Evaluation  Patient summary reviewed and Nursing notes reviewed no history of anesthetic complications:   Airway: Mallampati: II  TM distance: >3 FB     Mouth opening: > = 3 FB Dental: normal exam         Pulmonary:normal exam    (+) sleep apnea:                             Cardiovascular:  Exercise tolerance: good (>4 METS),   (+) hypertension:, hyperlipidemia    (-) past MI, CAD, CABG/stent, dysrhythmias,  angina,  CHF and orthopnea      Rhythm: regular  Rate: normal           Beta Blocker:  Not on Beta Blocker         Neuro/Psych:   (+) headaches:, psychiatric history:            GI/Hepatic/Renal: Neg GI/Hepatic/Renal ROS            Endo/Other: Negative Endo/Other ROS                    Abdominal:             Vascular:   + PVD, aortic or cerebral, . Other Findings:               Anesthesia Plan      general     ASA 3       Induction: intravenous. MIPS: Postoperative opioids intended and Prophylactic antiemetics administered. Anesthetic plan and risks discussed with patient.       Plan discussed with CRNA and surgical team.                  Bernadette Tenorio MD   4/4/2022

## 2022-04-05 LAB — SURGICAL PATHOLOGY REPORT: NORMAL

## 2022-04-05 NOTE — OP NOTE
[facility]                                OPERATIVE REPORT    PATIENT NAME: Kimberlee Wagoner                   :        1970  MED REC NO:   [id number]                             ROOM:  ACCOUNT NO:   [de-identified]                           ADMIT DATE: 2022  PROVIDER:     Ann Loredo MD    DATE OF PROCEDURE:  2022    PREOPERATIVE DIAGNOSES:  History of immunosuppression secondary to  kidney transplant, recurrent high-grade vulvar dysplasia, right-sided  posterior subcutaneous vulvar mass. POSTOPERATIVE DIAGNOSES:  History of immunosuppression secondary to  kidney transplant, recurrent high-grade vulvar dysplasia, right-sided  posterior subcutaneous vulvar mass, subcutaneous hematoma as vulvar  mass. EBL:  minimal    OPERATIONS:  Examination under anesthesia, extensive destruction of  right-sided vulvar lesions using an ultrasonic scalpel, and partial  vulvectomy and resection of vulvar mass which turned out to be a  hematoma from previous biopsy. SURGEON:  Ann Loredo MD    ASSISTANT:  Carola Soni, third year resident and [place], [place]    OPERATIVE FINDINGS:  The patient had extensive high-grade vulvar  dysplasia on the right side of the vulva. There were no perianal  anterior or left-sided lesions. All dysplasia was destroyed. The  patient also had a mobile subcutaneous mass on the right posterior  vulva. This was excised and found to be a firm hematoma in this area. The there were no other abnormal findings. DISPOSITION:  The patient to recovery room, stable. DESCRIPTION:  Informed consent was obtained. The patient was brought to  the operating suite. She was carefully positioned, prepped and draped  in the usual fashion. Catheter was used to drain the bladder in a  sterile manner.   The extensive dysplasia on the right side was outlined  with a marking pen with adequate ablation margins. I also palpated the  patient's right posterior subcutaneous mass. The skin was gently  incised over this mass using a hemostat. I was able to develop this  mass, which was found to be an organized firm hematoma in this area. The old blood was suctioned out. The defect in the deeper space that  had formed the hematoma was closed with some 2-0 Vicryl sutures to close  dead space here. The skin edges were left open and hemostatic to  prevent abscess formation given her immunosuppressive history. Using the ultrasound scalpel, I then completely vaporized and destroyed  the extensive DARRELL lesions on the right side of the vulva. There was no  evidence for invasive cancer and all of the dysplasia was destroyed. At  the end of that phase of the operation, everything was hemostatic and  normal in appearance. Silvadene cream was placed over the wound and the  patient was extubated, awakened, and moved to recovery room in stable  condition. Sponge, lap, needle, and instrument counts correct x2. Dr. Andrew Oviedo was scrubbed and present for the entire procedure.       Vidal Caicedo MD    D: 04/04/2022 17:39:29       T: 04/04/2022 17:43:47     [email or web address]  Job#: [id number]     Doc#: [id number]    CC:

## 2022-04-08 ENCOUNTER — OFFICE VISIT (OUTPATIENT)
Dept: PRIMARY CARE CLINIC | Age: 52
End: 2022-04-08
Payer: COMMERCIAL

## 2022-04-08 VITALS
DIASTOLIC BLOOD PRESSURE: 70 MMHG | BODY MASS INDEX: 38.9 KG/M2 | HEART RATE: 105 BPM | OXYGEN SATURATION: 96 % | SYSTOLIC BLOOD PRESSURE: 128 MMHG | WEIGHT: 263.4 LBS

## 2022-04-08 DIAGNOSIS — R20.0 NUMBNESS AND TINGLING OF RIGHT FACE: ICD-10-CM

## 2022-04-08 DIAGNOSIS — R20.2 NUMBNESS AND TINGLING OF RIGHT FACE: Primary | ICD-10-CM

## 2022-04-08 DIAGNOSIS — R20.0 NUMBNESS AND TINGLING OF RIGHT FACE: Primary | ICD-10-CM

## 2022-04-08 DIAGNOSIS — R20.2 NUMBNESS AND TINGLING OF RIGHT FACE: ICD-10-CM

## 2022-04-08 LAB
ALBUMIN SERPL-MCNC: 4.1 G/DL (ref 3.5–5.2)
ALBUMIN/GLOBULIN RATIO: 1.2 (ref 1–2.5)
ALP BLD-CCNC: 81 U/L (ref 35–104)
ALT SERPL-CCNC: 16 U/L (ref 5–33)
ANION GAP SERPL CALCULATED.3IONS-SCNC: 14 MMOL/L (ref 9–17)
AST SERPL-CCNC: 18 U/L
BILIRUB SERPL-MCNC: 0.56 MG/DL (ref 0.3–1.2)
BUN BLDV-MCNC: 14 MG/DL (ref 6–20)
BUN/CREAT BLD: ABNORMAL (ref 9–20)
CALCIUM SERPL-MCNC: 9.6 MG/DL (ref 8.6–10.4)
CHLORIDE BLD-SCNC: 101 MMOL/L (ref 98–107)
CO2: 22 MMOL/L (ref 20–31)
CREAT SERPL-MCNC: 1.03 MG/DL (ref 0.5–0.9)
ESTIMATED AVERAGE GLUCOSE: 114 MG/DL
FOLATE: 13.2 NG/ML
GFR AFRICAN AMERICAN: >60 ML/MIN
GFR NON-AFRICAN AMERICAN: 56 ML/MIN
GFR SERPL CREATININE-BSD FRML MDRD: ABNORMAL ML/MIN/{1.73_M2}
GFR SERPL CREATININE-BSD FRML MDRD: ABNORMAL ML/MIN/{1.73_M2}
GLUCOSE BLD-MCNC: 150 MG/DL (ref 70–99)
HBA1C MFR BLD: 5.6 % (ref 4–6)
MAGNESIUM: 1.6 MG/DL (ref 1.6–2.6)
POTASSIUM SERPL-SCNC: 4.2 MMOL/L (ref 3.7–5.3)
SODIUM BLD-SCNC: 137 MMOL/L (ref 135–144)
TOTAL PROTEIN: 7.4 G/DL (ref 6.4–8.3)
VITAMIN B-12: 537 PG/ML (ref 232–1245)
VITAMIN D 25-HYDROXY: 14.8 NG/ML

## 2022-04-08 PROCEDURE — 99213 OFFICE O/P EST LOW 20 MIN: CPT | Performed by: FAMILY MEDICINE

## 2022-04-08 ASSESSMENT — ENCOUNTER SYMPTOMS
COUGH: 0
DIARRHEA: 0
RHINORRHEA: 0
VOMITING: 0
SORE THROAT: 0
WHEEZING: 0
EYE DISCHARGE: 0
ABDOMINAL PAIN: 0
NAUSEA: 0
EYE REDNESS: 0
SHORTNESS OF BREATH: 0

## 2022-04-08 ASSESSMENT — PATIENT HEALTH QUESTIONNAIRE - PHQ9
SUM OF ALL RESPONSES TO PHQ9 QUESTIONS 1 & 2: 1
SUM OF ALL RESPONSES TO PHQ QUESTIONS 1-9: 1
SUM OF ALL RESPONSES TO PHQ QUESTIONS 1-9: 1
SUM OF ALL RESPONSES TO PHQ QUESTIONS 1-9: 2
4. FEELING TIRED OR HAVING LITTLE ENERGY: 0
1. LITTLE INTEREST OR PLEASURE IN DOING THINGS: 0
SUM OF ALL RESPONSES TO PHQ QUESTIONS 1-9: 2
8. MOVING OR SPEAKING SO SLOWLY THAT OTHER PEOPLE COULD HAVE NOTICED. OR THE OPPOSITE, BEING SO FIGETY OR RESTLESS THAT YOU HAVE BEEN MOVING AROUND A LOT MORE THAN USUAL: 0
SUM OF ALL RESPONSES TO PHQ QUESTIONS 1-9: 1
SUM OF ALL RESPONSES TO PHQ QUESTIONS 1-9: 1
SUM OF ALL RESPONSES TO PHQ QUESTIONS 1-9: 2
SUM OF ALL RESPONSES TO PHQ9 QUESTIONS 1 & 2: 0
10. IF YOU CHECKED OFF ANY PROBLEMS, HOW DIFFICULT HAVE THESE PROBLEMS MADE IT FOR YOU TO DO YOUR WORK, TAKE CARE OF THINGS AT HOME, OR GET ALONG WITH OTHER PEOPLE: 1
7. TROUBLE CONCENTRATING ON THINGS, SUCH AS READING THE NEWSPAPER OR WATCHING TELEVISION: 0
2. FEELING DOWN, DEPRESSED OR HOPELESS: 0
3. TROUBLE FALLING OR STAYING ASLEEP: 2
5. POOR APPETITE OR OVEREATING: 0
6. FEELING BAD ABOUT YOURSELF - OR THAT YOU ARE A FAILURE OR HAVE LET YOURSELF OR YOUR FAMILY DOWN: 0
1. LITTLE INTEREST OR PLEASURE IN DOING THINGS: 0
SUM OF ALL RESPONSES TO PHQ QUESTIONS 1-9: 2
2. FEELING DOWN, DEPRESSED OR HOPELESS: 1
9. THOUGHTS THAT YOU WOULD BE BETTER OFF DEAD, OR OF HURTING YOURSELF: 0

## 2022-04-08 ASSESSMENT — COLUMBIA-SUICIDE SEVERITY RATING SCALE - C-SSRS
2. HAVE YOU ACTUALLY HAD ANY THOUGHTS OF KILLING YOURSELF?: NO
1. WITHIN THE PAST MONTH, HAVE YOU WISHED YOU WERE DEAD OR WISHED YOU COULD GO TO SLEEP AND NOT WAKE UP?: NO
6. HAVE YOU EVER DONE ANYTHING, STARTED TO DO ANYTHING, OR PREPARED TO DO ANYTHING TO END YOUR LIFE?: NO

## 2022-04-08 NOTE — PROGRESS NOTES
[facility]  [address] [address]  Dept: [address] Lester Bong Rice is a 46 y.o. female Established patient, who presents today for her medical conditions/complaints as noted below. Chief Complaint   Patient presents with    Hypertension     Patient checks B/P at times    Other     Patient c/o of tip of tongue being numb as well as both upper and lower lips and tip of nose       HPI:     HPI-had surgery for her cancer earlier this week. Still having pain and discomfort and some bleeding. Has f/u with surgeon next week. Started with numbness in her tongue,nose and lips. Had it when she stood up - lasted a few seconds and then would go away. Happened every time she stood up and then continued to linger and is there all the time. Feels weak in the arms but that has been and thinks that is just because she is out of shape. Was happening long time before the numbness. No rash and never had any pain.         Reviewed prior notes None  Reviewed previous Labs and Imaging    LDL Cholesterol (mg/dL)   Date Value   04/02/2022 159 (H)   03/05/2022 138 (H)   01/15/2022 151 (H)       (goal LDL is <100)   AST (U/L)   Date Value   04/02/2022 13     ALT (U/L)   Date Value   04/02/2022 14     BUN (mg/dL)   Date Value   04/02/2022 20     Hemoglobin A1C (%)   Date Value   01/15/2022 5.4     BP Readings from Last 3 Encounters:   04/08/22 128/70   04/04/22 121/80   04/04/22 86/65          (goal 120/80)    Past Medical History:   Diagnosis Date    Abdominal pain 5/29/2010    Acute cystitis 10/21/2015    Acute pancreatitis 10/21/2015    Arm pain 10/21/2015    Arm swelling 10/21/2015    Arteriovenous fistula ([facility] 12/12/2014    Arthritis     bilateral knees    Depressive disorder 5/29/2010    Dysphagia 7/9/2010    Dysuria 10/21/2015    End-stage renal disease ([facility] [address] 5/29/2010    Fistula 10/21/2015    Gout     Hemodialysis patient ([facility] [address] [id number].  History of kidney transplant 10/21/2015    History of renal transplant 3/27/2014   Coleman Salines and fatigue 7/9/2010    Polycystic kidney disease     bilateral. kidney transplant. No longer sees Nephrology. Sees Urologist @ [facility]    Restless legs syndrome     Sleep apnea     no machine    Tachycardia 11/24/2010    Vaginal lesion 7/1/2021    Vulvar carcinoma (HCC)     Vulvar mass Right 8/19/2021    Wellness examination     Dr. Brendan Alegria      Past Surgical History:   Procedure Laterality Date    AV FISTULA CREATION      AV FISTULA REPAIR      took fistula down/ reversed    COLPOSCOPY  04/04/2022     ULTRASONIC SCAPEL ABLATION OF VULVA     GALLBLADDER SURGERY      LAPAROSCOPIC    HERNIA REPAIR      MULIPTLE ABDOMINAL WALL FROM PERITONEAL DIALYSIS AND ABD SURGERIES    HYSTERECTOMY      PARTIAL- BOTH OVARIES STILL THERE    KIDNEY TRANSPLANT  2012    [facility]    MANDIBLE FRACTURE SURGERY      FOR BITE CORRECTION- NO TRAUMA    PARATHYROID GLAND SURGERY  2006    REMOVED 1 GLAND    TOTAL NEPHRECTOMY Bilateral     POLYCYSTIC KIDNEY    VULVAR/PERINEAL BIOPSY N/A 4/4/2022    COLPOSCOPY, ULTRASONIC SCAPEL ABLATION OF VULVA performed by Richie Campbell MD at [place] [address] Right 10/5/2021    RIGHT PARTIAL ZACK-VULVECTOMY, ULTRASONIC SCALPEL TREATMENT performed by Alon Carroll MD at [place] History   Problem Relation Age of Onset    Kidney Disease Mother         polycystic kidney disease.      Heart Disease Mother     High Blood Pressure Father     Diabetes Maternal Grandmother     Other Maternal Grandfather         brain aneurysm    Cancer Maternal Uncle         nonhodgkins lymphoma    Kidney Disease Other         multiple aunts and uncles with polycystic kidney       Social History     Tobacco Use    Smoking status: Former Smoker     Packs/day: 0.25     Years: 5.00     Pack years: 1.25     Types: Cigarettes     Quit date: 1/1/1991     Years since quitting: 31.2    Smokeless tobacco: Never Used   Substance Use Topics    Alcohol use: Yes     Comment: very seldom. few times a year      Current Outpatient Medications   Medication Sig Dispense Refill    acetaminophen (TYLENOL) 500 MG tablet Take 2 tablets by mouth every 6 hours as needed for Pain 120 tablet 0    HYDROcodone-acetaminophen (NORCO) 5-325 MG per tablet Take 1 tablet by mouth every 4 hours as needed for Pain for up to 7 days. Intended supply: 3 days. Take lowest dose possible to manage pain 10 tablet 0    traZODone (DESYREL) 25 mg TABS Take 25 mg by mouth nightly      omeprazole (PRILOSEC) 20 MG delayed release capsule Prilosec 20 mg capsule,delayed release   Take 1 capsule as needed by oral route.  tacrolimus (PROGRAF) 1 MG capsule 4 tabs bid plus 0.5 mg bid for total of 4.5 mg bid 60 capsule 0    tacrolimus (PROGRAF) 0.5 MG capsule Take 1 capsule by mouth 2 times daily For total of 4.5 mg bid 60 capsule 0    mycophenolate (MYFORTIC) 180 MG DR tablet Take 360 mg by mouth 2 times daily 2 tablets twice daily      meloxicam (MOBIC) 15 MG tablet Take 15 mg by mouth as needed for Pain (Patient not taking: Reported on 4/8/2022)      Acetaminophen (TYLENOL PO) Take by mouth as needed (Patient not taking: Reported on 4/8/2022)       No current facility-administered medications for this visit.      Allergies   Allergen Reactions    Chloraprep One Step [Chlorhexidine Gluconate] Itching    Morphine Hives     Itching and hives    Seasonal Itching    Cat Hair Extract Itching    Dust Mite Extract Itching       Health Maintenance   Topic Date Due    Hepatitis C screen  Never done    COVID-19 Vaccine (1) Never done    HIV screen  Never done    Colorectal Cancer Screen  Never done    Shingles Vaccine (1 of 2) Never done    Flu vaccine (Season Ended) 09/01/2022    Depression Monitoring  01/07/2023    Breast cancer screen  04/21/2023    Diabetes screen  01/15/2025    Lipid screen  04/02/2027    DTaP/Tdap/Td vaccine (2 - Td or Tdap) 08/24/2030    Pneumococcal 0-64 years Vaccine (4 of 4 - PPSV23) 08/18/2033    Hepatitis A vaccine  Aged Out    Hepatitis B vaccine  Aged Out    Hib vaccine  Aged Out    Meningococcal (ACWY) vaccine  Aged Out       Subjective:      Review of Systems   Constitutional: Negative for chills and fever. HENT: Negative for rhinorrhea and sore throat. Eyes: Negative for discharge and redness. Respiratory: Negative for cough, shortness of breath and wheezing. Cardiovascular: Negative for chest pain and palpitations. Gastrointestinal: Negative for abdominal pain, diarrhea, nausea and vomiting. Genitourinary: Negative for dysuria and frequency. Musculoskeletal: Negative for arthralgias and myalgias. Neurological: Positive for numbness. Negative for dizziness, weakness, light-headedness and headaches. Psychiatric/Behavioral: Negative for sleep disturbance. Objective:     /70   Pulse 105   Wt 263 lb 6.4 oz (119.5 kg)   SpO2 96%   BMI 38.90 kg/m²   Physical Exam  Vitals and nursing note reviewed. Constitutional:       General: She is not in acute distress. Appearance: She is well-developed. She is not ill-appearing. HENT:      Head: Normocephalic and atraumatic. Right Ear: External ear normal.      Left Ear: External ear normal.   Eyes:      General: No scleral icterus. Right eye: No discharge. Left eye: No discharge. Conjunctiva/sclera: Conjunctivae normal.      Pupils: Pupils are equal, round, and reactive to light. Neck:      Thyroid: No thyromegaly. Trachea: No tracheal deviation. Cardiovascular:      Rate and Rhythm: Normal rate and regular rhythm. Heart sounds: Normal heart sounds. Pulmonary:      Effort: Pulmonary effort is normal. No respiratory distress. Breath sounds: Normal breath sounds. No wheezing. Lymphadenopathy:      Cervical: No cervical adenopathy.    Skin:     General: Skin is warm and dry. Findings: No erythema or rash. Neurological:      Mental Status: She is alert and oriented to person, place, and time. Comments: Decreased sensation right side of face, no weakness   Psychiatric:         Mood and Affect: Mood normal.         Behavior: Behavior normal.         Thought Content: Thought content normal.         Assessment/Plan:   1. Numbness and tingling of right face  -     Vitamin B12 & Folate; Future  -     Vitamin D 25 Hydroxy; Future  -     Magnesium; Future  -     Comprehensive Metabolic Panel; Future  -     Hemoglobin A1C; Future  -     MRI BRAIN WO CONTRAST; Future       No follow-ups on file. Orders Placed This Encounter   Procedures    MRI BRAIN WO CONTRAST     Standing Status:   Future     Standing Expiration Date:   4/8/2023    Vitamin B12 & Folate     Standing Status:   Future     Standing Expiration Date:   4/9/2023    Vitamin D 25 Hydroxy     Standing Status:   Future     Standing Expiration Date:   4/8/2023    Magnesium     Standing Status:   Future     Standing Expiration Date:   4/8/2023    Comprehensive Metabolic Panel     Standing Status:   Future     Standing Expiration Date:   4/9/2023    Hemoglobin A1C     Standing Status:   Future     Standing Expiration Date:   4/9/2023     No orders of the defined types were placed in this encounter. Patient given educational materials - see patient instructions. Discussed use, benefit, and side effects of prescribed medications. All patient questions answered. Pt voiced understanding. Reviewed health maintenance. Instructed to continue current medications, diet and exercise. Patient agreed with treatment plan. Follow up as directed.      Electronically signed by Wendy Brand MD on 4/8/2022 at 8:26 AM

## 2022-04-11 NOTE — RESULT ENCOUNTER NOTE
Adv pt vitamin D level is really low- start on vitamin D 5000 IU daily for a month, then decrease to 2000 IU daily

## 2022-04-21 ENCOUNTER — TELEPHONE (OUTPATIENT)
Dept: PRIMARY CARE CLINIC | Age: 52
End: 2022-04-21

## 2022-04-21 ENCOUNTER — HOSPITAL ENCOUNTER (OUTPATIENT)
Dept: MRI IMAGING | Age: 52
Discharge: HOME | End: 2022-04-23
Payer: COMMERCIAL

## 2022-04-21 DIAGNOSIS — R20.0 NUMBNESS AND TINGLING OF RIGHT FACE: ICD-10-CM

## 2022-04-21 DIAGNOSIS — R20.2 NUMBNESS AND TINGLING OF RIGHT FACE: ICD-10-CM

## 2022-04-21 PROCEDURE — 70551 MRI BRAIN STEM W/O DYE: CPT

## 2022-04-21 NOTE — TELEPHONE ENCOUNTER
You saw pt on 4/8/22. Pt was started on Vit D 5000 daily for a month, then down to 2000 IU daily. Pt asking when you want to see her back in the office?

## 2022-04-27 PROBLEM — N18.2 CKD (CHRONIC KIDNEY DISEASE), STAGE II: Status: ACTIVE | Noted: 2022-04-27

## 2022-05-04 ENCOUNTER — HOSPITAL ENCOUNTER (OUTPATIENT)
Age: 52
Discharge: HOME | End: 2022-05-04
Payer: COMMERCIAL

## 2022-05-04 PROBLEM — Z98.890 POST-OPERATIVE STATE: Status: RESOLVED | Noted: 2022-04-04 | Resolved: 2022-05-04

## 2022-05-04 LAB
ABSOLUTE EOS #: 0.3 K/UL (ref 0–0.4)
ABSOLUTE LYMPH #: 1.7 K/UL (ref 1–4.8)
ABSOLUTE MONO #: 0.4 K/UL (ref 0.1–1.3)
ALBUMIN SERPL-MCNC: 3.9 G/DL (ref 3.5–5.2)
ALP BLD-CCNC: 75 U/L (ref 35–104)
ALT SERPL-CCNC: 13 U/L (ref 5–33)
ANION GAP SERPL CALCULATED.3IONS-SCNC: 10 MMOL/L (ref 9–17)
AST SERPL-CCNC: 15 U/L
BASOPHILS # BLD: 1 % (ref 0–2)
BASOPHILS ABSOLUTE: 0.1 K/UL (ref 0–0.2)
BILIRUB SERPL-MCNC: 0.41 MG/DL (ref 0.3–1.2)
BUN BLDV-MCNC: 14 MG/DL (ref 6–20)
CALCIUM SERPL-MCNC: 9.3 MG/DL (ref 8.6–10.4)
CHLORIDE BLD-SCNC: 104 MMOL/L (ref 98–107)
CHOLESTEROL/HDL RATIO: 4.4
CHOLESTEROL: 220 MG/DL
CO2: 23 MMOL/L (ref 20–31)
CREAT SERPL-MCNC: 0.92 MG/DL (ref 0.5–0.9)
EOSINOPHILS RELATIVE PERCENT: 5 % (ref 0–4)
GFR AFRICAN AMERICAN: >60 ML/MIN
GFR NON-AFRICAN AMERICAN: >60 ML/MIN
GFR SERPL CREATININE-BSD FRML MDRD: ABNORMAL ML/MIN/{1.73_M2}
GLUCOSE BLD-MCNC: 125 MG/DL (ref 70–99)
HCT VFR BLD CALC: 37.8 % (ref 36–46)
HDLC SERPL-MCNC: 50 MG/DL
HEMOGLOBIN: 13 G/DL (ref 12–16)
LDL CHOLESTEROL: 146 MG/DL (ref 0–130)
LYMPHOCYTES # BLD: 30 % (ref 24–44)
MAGNESIUM: 1.6 MG/DL (ref 1.6–2.6)
MCH RBC QN AUTO: 29.6 PG (ref 26–34)
MCHC RBC AUTO-ENTMCNC: 34.4 G/DL (ref 31–37)
MCV RBC AUTO: 86.1 FL (ref 80–100)
MONOCYTES # BLD: 6 % (ref 1–7)
PDW BLD-RTO: 13.5 % (ref 11.5–14.9)
PHOSPHORUS: 2.7 MG/DL (ref 2.6–4.5)
PLATELET # BLD: 189 K/UL (ref 150–450)
PMV BLD AUTO: 7.9 FL (ref 6–12)
POTASSIUM SERPL-SCNC: 4.4 MMOL/L (ref 3.7–5.3)
RBC # BLD: 4.39 M/UL (ref 4–5.2)
SEG NEUTROPHILS: 58 % (ref 36–66)
SEGMENTED NEUTROPHILS ABSOLUTE COUNT: 3.5 K/UL (ref 1.3–9.1)
SODIUM BLD-SCNC: 137 MMOL/L (ref 135–144)
TOTAL PROTEIN: 7.1 G/DL (ref 6.4–8.3)
TRIGL SERPL-MCNC: 122 MG/DL
URIC ACID: 7.3 MG/DL (ref 2.4–5.7)
WBC # BLD: 5.9 K/UL (ref 3.5–11)

## 2022-05-04 PROCEDURE — 36415 COLL VENOUS BLD VENIPUNCTURE: CPT

## 2022-05-04 PROCEDURE — 83735 ASSAY OF MAGNESIUM: CPT

## 2022-05-04 PROCEDURE — 84100 ASSAY OF PHOSPHORUS: CPT

## 2022-05-04 PROCEDURE — 85025 COMPLETE CBC W/AUTO DIFF WBC: CPT

## 2022-05-04 PROCEDURE — 80061 LIPID PANEL: CPT

## 2022-05-04 PROCEDURE — 80053 COMPREHEN METABOLIC PANEL: CPT

## 2022-05-04 PROCEDURE — 84550 ASSAY OF BLOOD/URIC ACID: CPT

## 2022-05-04 PROCEDURE — 80197 ASSAY OF TACROLIMUS: CPT

## 2022-05-06 LAB — PROGRAF: 3.6 NG/ML

## 2022-06-02 ENCOUNTER — PATIENT MESSAGE (OUTPATIENT)
Dept: PRIMARY CARE CLINIC | Age: 52
End: 2022-06-02

## 2022-06-02 RX ORDER — HYDROXYZINE HYDROCHLORIDE 25 MG/1
25 TABLET, FILM COATED ORAL EVERY 6 HOURS PRN
Qty: 30 TABLET | Refills: 1 | Status: SHIPPED | OUTPATIENT
Start: 2022-06-02 | End: 2022-06-12

## 2022-06-02 RX ORDER — TRAZODONE HYDROCHLORIDE 50 MG/1
50 TABLET ORAL NIGHTLY
Qty: 30 TABLET | Refills: 5 | Status: SHIPPED | OUTPATIENT
Start: 2022-06-02 | End: 2022-09-16

## 2022-06-02 NOTE — TELEPHONE ENCOUNTER
From: Avni Disla  To: Dr. Marbella Bojorquez: 6/2/2022 12:43 PM EDT  Subject: Prescription     I'm wondering if I can get something for nerves. My mother is hospitalized due to cardiac issues and unable to make her own decisions. At the same time I'm caring for my father who suffers from dementia and has been experiencing aggressive behavior and wandering away. Needless to say I am extremely anxious, nervous, stressed and just getting mentally exhausted. In addition, the pharmacy said my script for Kevin Cortez was cancelled by my doctor. This really has not been helpful anyway.     Thanks,  RONNI Coe

## 2022-06-10 ENCOUNTER — OFFICE VISIT (OUTPATIENT)
Dept: GYNECOLOGIC ONCOLOGY | Age: 52
End: 2022-06-10

## 2022-06-10 VITALS
TEMPERATURE: 97.5 F | HEART RATE: 84 BPM | BODY MASS INDEX: 39.25 KG/M2 | SYSTOLIC BLOOD PRESSURE: 122 MMHG | DIASTOLIC BLOOD PRESSURE: 88 MMHG | WEIGHT: 265 LBS | HEIGHT: 69 IN | OXYGEN SATURATION: 98 %

## 2022-06-10 DIAGNOSIS — Z92.25 H/O IMMUNOSUPPRESSIVE THERAPY: ICD-10-CM

## 2022-06-10 DIAGNOSIS — N90.3 VULVAR DYSPLASIA: Primary | ICD-10-CM

## 2022-06-10 PROCEDURE — 99024 POSTOP FOLLOW-UP VISIT: CPT | Performed by: PHYSICIAN ASSISTANT

## 2022-06-10 RX ORDER — NYSTATIN 100000 [USP'U]/G
POWDER TOPICAL
Qty: 60 G | Refills: 1 | Status: SHIPPED | OUTPATIENT
Start: 2022-06-10

## 2022-06-10 ASSESSMENT — ENCOUNTER SYMPTOMS
RESPIRATORY NEGATIVE: 1
GASTROINTESTINAL NEGATIVE: 1

## 2022-06-10 NOTE — PROGRESS NOTES
Review of Systems   Constitutional: Negative. HENT:   Negative for lump/mass. Respiratory: Negative. Cardiovascular: Negative. Gastrointestinal: Negative. Endocrine: Negative. Genitourinary: Negative. Musculoskeletal: Negative. Skin: Negative. Hematological: Bruises/bleeds easily.

## 2022-06-10 NOTE — PROGRESS NOTES
[address] [place] [address], [facility], [address] [facility] [id number]    Chief Complaint: Andreas Stockton is a 46 y.o. female who presents for follow up surveillance for vulvar dysplasia     Chief Complaint   Patient presents with    Post-Op Check       HPI:   6/10/2022  Daiana Mahmood  46 y.o.  female who initially saw her local OB/GYN provider in 2021 for her annual exam she had concerns of a labial soreness that was present for approximately 2 months. She states she was using Neosporin without relief. She has a surgical history pertinent for hysterectomy secondary to \"heavy menses\". Recalls abnormal Pap smears in her 25s and she believes she had to have \"laser therapy\"    Her Gynecologic provider Ani Apodaca NP obtained a vaginal Pap smear on 21 which later revealed low-grade CHARIS, HPV negative. She did return for a colposcopy and biopsy of the labial lesion on 2021 with Dr. Reta Urban. The vaginal mucosa was noted to have no abnormal appearances. The lesion of the right labia was biopsied at this time. Vulvar biopsy later returned positive for DARRELL 3 and she was referred to [place] for further evaluation and treatment. Her medical history is significant for polycystic kidney disease and she has a renal transplant receipt currently on long term use of immunosuppressant medication Prograf. She is a former smoker, cessation over 30 years ago    She was seen by [facility] gynecology oncology and ultimately underwent a right natali vulvectomy with a partial vulvectomy using ultrasonic scalpel on 10/6/2021. 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 The margins were treated with ultrasonic scalpel at the time of the excision.     She healed well post operatively and was counseled on close follow up and surveillance with pelvic examinations and colposcopy. Her follow-up surveillance in March 2022 with vulvar colposcopy revealed lesion of the right labia which was biopsied and returned high-grade squamous intraepithelial lesion 2-3. Therefore she return for surgical consultation with GYN oncologist Dr. Hugo Powell and ultimately underwent exam under anesthesia, colposcopy with partial vulvectomy using ultrasonic scalpel vulvar lesions. The final pathology was consistent with fragments of high grade squamous intraepithelial lesion (DARRELL 2)    Today, she states she is doing well. She did states that she had moderate tenderness for approximately 3 weeks after her procedure. She is now approximately 9 weeks from her procedure. She has minimal to no vulvar irritation. She has no vaginal bleeding. She has no new lumps or bumps. Past Medical History:   Diagnosis Date    Abdominal pain 5/29/2010    Acute cystitis 10/21/2015    Acute pancreatitis 10/21/2015    Arm pain 10/21/2015    Arm swelling 10/21/2015    Arteriovenous fistula (HCC) 12/12/2014    Arthritis     bilateral knees    Depressive disorder 5/29/2010    Dysphagia 7/9/2010    Dysuria 10/21/2015    End-stage renal disease ([facility] [address] 5/29/2010    Fistula 10/21/2015    Gout     Hemodialysis patient ([facility] [address]     [id number].  History of kidney transplant 10/21/2015    History of renal transplant 3/27/2014   Hugo Mason and fatigue 7/9/2010    Polycystic kidney disease     bilateral. kidney transplant. No longer sees Nephrology.  Sees Urologist @ [facility]    Restless legs syndrome     Sleep apnea     no machine    Tachycardia 11/24/2010    Vaginal lesion 7/1/2021    Vulvar carcinoma (HCC)     Vulvar mass Right 8/19/2021    Wellness examination     Dr. Charlotte Taylor         Past Surgical History:   Procedure Laterality Date    AV FISTULA CREATION      AV FISTULA REPAIR      took fistula down/ reversed    COLPOSCOPY  04/04/2022     ULTRASONIC SCAPEL ABLATION OF VULVA     GALLBLADDER SURGERY      LAPAROSCOPIC    HERNIA REPAIR      [facility] ABDOMINAL WALL FROM PERITONEAL DIALYSIS AND ABD SURGERIES    HYSTERECTOMY (CERVIX STATUS UNKNOWN)      PARTIAL- BOTH OVARIES STILL THERE    KIDNEY TRANSPLANT      [facility]    MANDIBLE FRACTURE SURGERY      FOR BITE CORRECTION- NO TRAUMA    PARATHYROID GLAND SURGERY  2006    REMOVED 1 GLAND    TOTAL NEPHRECTOMY Bilateral     POLYCYSTIC KIDNEY    VULVAR/PERINEAL BIOPSY N/A 2022    COLPOSCOPY, ULTRASONIC SCAPEL ABLATION OF VULVA performed by Neelima Martinez MD at [facility] [address] [facility] Right 10/5/2021    RIGHT PARTIAL ZACK-VULVECTOMY, ULTRASONIC SCALPEL TREATMENT performed by Emely Delaney MD at [place] History   Problem Relation Age of Onset    Kidney Disease Mother         polycystic kidney disease.  Heart Disease Mother     High Blood Pressure Father     Diabetes Maternal Grandmother     Other Maternal Grandfather         brain aneurysm    Cancer Maternal Uncle         nonhodgkins lymphoma    Kidney Disease Other         multiple aunts and uncles with polycystic kidney       Social History     Socioeconomic History    Marital status:      Spouse name: Not on file    Number of children: Not on file    Years of education: Not on file    Highest education level: Not on file   Occupational History    Not on file   Tobacco Use    Smoking status: Former Smoker     Packs/day: 0.25     Years: 5.00     Pack years: 1.25     Types: Cigarettes     Quit date: 1991     Years since quittin.4    Smokeless tobacco: Never Used   Vaping Use    Vaping Use: Never used   Substance and Sexual Activity    Alcohol use: Yes     Comment: very seldom.  few times a year    Drug use: No    Sexual activity: Yes   Other Topics Concern    Not on file   Social History Narrative    Not on file     Social Determinants of Health     Financial Resource Strain: Low Risk     Difficulty of Paying Living Expenses: Not hard at all   Food Insecurity: No Food Insecurity    Worried About [address] [place] [address] in the Last Year: Never true    Ran Out of Food in the Last Year: Never true   Transportation Needs:     Lack of Transportation (Medical): Not on file    Lack of Transportation (Non-Medical): Not on file   Physical Activity:     Days of Exercise per Week: Not on file    Minutes of Exercise per Session: Not on file   Stress:     Feeling of Stress : Not on file   Social Connections:     Frequency of Communication with Friends and Family: Not on file    Frequency of Social Gatherings with Friends and Family: Not on file    Attends Latter-day Services: Not on file    Active Member of [address] [place] [address] or Organizations: Not on file    Attends Club or Organization Meetings: Not on file    Marital Status: Not on file   Intimate Partner Violence:     Fear of Current or Ex-Partner: Not on file    Emotionally Abused: Not on file    Physically Abused: Not on file    Sexually Abused: Not on file   Housing Stability:     Unable to Pay for Housing in the Last Year: Not on file    Number of [place] in the Last Year: Not on file    Unstable Housing in the Last Year: Not on file       Current Outpatient Medications on File Prior to Visit   Medication Sig Dispense Refill    allopurinol (ZYLOPRIM) 100 MG tablet TAKE 1 TABLET BY MOUTH EVERY DAY 30 tablet 6    hydrOXYzine (ATARAX) 25 MG tablet Take 1 tablet by mouth every 6 hours as needed for Anxiety 30 tablet 1    traZODone (DESYREL) 50 MG tablet Take 1 tablet by mouth nightly 30 tablet 5    magnesium oxide (MAG-OX) 400 MG tablet Take 1 tablet by mouth daily 30 tablet 6    omeprazole (PRILOSEC) 20 MG delayed release capsule Prilosec 20 mg capsule,delayed release   Take 1 capsule as needed by oral route.       tacrolimus (PROGRAF) 1 MG capsule 4 tabs bid plus 0.5 mg bid for total of 4.5 mg bid 60 capsule 0    tacrolimus (PROGRAF) 0.5 MG capsule Take 1 capsule by mouth 2 times daily For total of 4.5 mg bid 60 capsule 0    mycophenolate (MYFORTIC) 180 MG DR tablet Take 360 mg by mouth 2 times daily 2 tablets twice daily       No current facility-administered medications on file prior to visit. Allergies as of 06/10/2022 - Fully Reviewed 06/10/2022   Allergen Reaction Noted    Chloraprep one step [chlorhexidine gluconate] Itching 10/21/2015    Morphine Hives 10/21/2015    Seasonal Itching 09/20/2019    Cat hair extract Itching 09/20/2019    Dust mite extract Itching 10/21/2015           INDICATIONS:   1. Vulvar dysplasia    2. H/O immunosuppressive therapy      Abnormal Cytology and Colposcopy History:  8/18/2021: Cervical cytology revealed low-grade CHARIS. HPV negative  9/2/2021: Vaginal colposcopy negative, vulvar biopsy positive for DARRELL 3  10/5/2021: Treatment for high-grade squamous intraepithelial vulvar dysplasia  3/10/22: Right labia biopsy positive for DARRELL 2-3  4/4/22: Treatment for high-grade squamous intraepithelial lesion    COLPOSCOPIC EXAMINATION:                Blood pressure 122/88, pulse 84, temperature 97.5 °F (36.4 °C), height 5' 9\" (1.753 m), weight 265 lb (120.2 kg), SpO2 98 %, not currently breastfeeding. Physical Exam  Genitourinary:      Rectum normal.      Right Labia: lesions. Right Labia: No tenderness. Left Labia: lesions. Left Labia: No tenderness. Rectum:      No rectal mass or tenderness. IMPRESSIONS: low-grade dysplasia spanning bilateral labia minora. Assessment:   Diagnosis Orders   1. Vulvar dysplasia     2. H/O immunosuppressive therapy         PLAN:   1. Reviewed persistent dysplasia vs continued healing, no repeat biopsy taken today given recent surgery. Patient is chronic immunosuppressive patient. She is minimally asymptomatic with an intermittent vulvar itch.  We will plan for close surveillance and if she becomes symptomatic or on colposcopy there is evidence of more advanced on

## 2022-07-25 ENCOUNTER — OFFICE VISIT (OUTPATIENT)
Dept: PRIMARY CARE CLINIC | Age: 52
End: 2022-07-25
Payer: COMMERCIAL

## 2022-07-25 VITALS
WEIGHT: 262.8 LBS | OXYGEN SATURATION: 97 % | HEART RATE: 91 BPM | BODY MASS INDEX: 38.81 KG/M2 | DIASTOLIC BLOOD PRESSURE: 88 MMHG | SYSTOLIC BLOOD PRESSURE: 124 MMHG

## 2022-07-25 DIAGNOSIS — F32.A DEPRESSION, UNSPECIFIED DEPRESSION TYPE: ICD-10-CM

## 2022-07-25 DIAGNOSIS — N90.3 VULVAR DYSPLASIA: ICD-10-CM

## 2022-07-25 DIAGNOSIS — I10 HYPERTENSION, UNSPECIFIED TYPE: ICD-10-CM

## 2022-07-25 PROCEDURE — 99214 OFFICE O/P EST MOD 30 MIN: CPT | Performed by: FAMILY MEDICINE

## 2022-07-25 RX ORDER — ESCITALOPRAM OXALATE 5 MG/1
5 TABLET ORAL DAILY
Qty: 30 TABLET | Refills: 3 | Status: SHIPPED | OUTPATIENT
Start: 2022-07-25 | End: 2022-08-17

## 2022-07-25 SDOH — ECONOMIC STABILITY: FOOD INSECURITY: WITHIN THE PAST 12 MONTHS, THE FOOD YOU BOUGHT JUST DIDN'T LAST AND YOU DIDN'T HAVE MONEY TO GET MORE.: NEVER TRUE

## 2022-07-25 SDOH — ECONOMIC STABILITY: FOOD INSECURITY: WITHIN THE PAST 12 MONTHS, YOU WORRIED THAT YOUR FOOD WOULD RUN OUT BEFORE YOU GOT MONEY TO BUY MORE.: NEVER TRUE

## 2022-07-25 ASSESSMENT — ENCOUNTER SYMPTOMS
DIARRHEA: 0
NAUSEA: 0
SHORTNESS OF BREATH: 0
COUGH: 0

## 2022-07-25 ASSESSMENT — SOCIAL DETERMINANTS OF HEALTH (SDOH): HOW HARD IS IT FOR YOU TO PAY FOR THE VERY BASICS LIKE FOOD, HOUSING, MEDICAL CARE, AND HEATING?: NOT HARD AT ALL

## 2022-07-25 NOTE — PROGRESS NOTES
[facility] PRIMARY CARE  [address] [address]  [address] [address]  Dept: [address] Griselda Semen is a 46 y.o. female Established patient, who presents today for her medical conditions/complaints as noted below. Chief Complaint   Patient presents with    Hypertension     Patient checks B/P at times    Depression     Patient stated that depression has increased     Hyperlipidemia    Anxiety       HPI:     HPI- Pt states depression/ anxiety are worse. Mom back in the hospital and dad has dementia. Stress getting worse. Did not take the atarax due to concern about the tacrolimus. Looking at using efudex for her vulvar Ca due to need for recurrent surgeries. Reviewed prior notes: None   Reviewed previous:  Labs    LDL Cholesterol (mg/dL)   Date Value   05/04/2022 146 (H)   04/02/2022 159 (H)   03/05/2022 138 (H)       (goal LDL is <100)   AST (U/L)   Date Value   05/04/2022 15     ALT (U/L)   Date Value   05/04/2022 13     BUN (mg/dL)   Date Value   05/04/2022 14     Hemoglobin A1C (%)   Date Value   04/08/2022 5.6     BP Readings from Last 3 Encounters:   07/25/22 124/88   06/10/22 122/88   05/11/22 110/78          (goal 120/80)    Past Medical History:   Diagnosis Date    Abdominal pain 5/29/2010    Acute cystitis 10/21/2015    Acute pancreatitis 10/21/2015    Arm pain 10/21/2015    Arm swelling 10/21/2015    Arteriovenous fistula ([facility] 12/12/2014    Arthritis     bilateral knees    Depressive disorder 5/29/2010    Dysphagia 7/9/2010    Dysuria 10/21/2015    End-stage renal disease ([facility] 5/29/2010    Fistula 10/21/2015    Gout     Hemodialysis patient ([facility] [address]     [id number]. History of kidney transplant 10/21/2015    History of renal transplant 3/27/2014    Malaise and fatigue 7/9/2010    Polycystic kidney disease     bilateral. kidney transplant. No longer sees Nephrology.  Sees Urologist @ [facility]    Restless legs syndrome     Sleep apnea     no machine Tachycardia 2010    Vaginal lesion 2021    Vulvar carcinoma ([address]     Vulvar mass Right 2021    Wellness examination     Dr. Dreama Nageotte      Past Surgical History:   Procedure Laterality Date    AV FISTULA CREATION      AV FISTULA REPAIR      took fistula down/ reversed    COLPOSCOPY  2022     ULTRASONIC SCAPEL ABLATION OF VULVA     GALLBLADDER SURGERY      LAPAROSCOPIC    HERNIA REPAIR      MULIPTLE ABDOMINAL WALL FROM PERITONEAL DIALYSIS AND ABD SURGERIES    HYSTERECTOMY (CERVIX STATUS UNKNOWN)      PARTIAL- BOTH OVARIES STILL THERE    KIDNEY TRANSPLANT      [facility]    MANDIBLE FRACTURE SURGERY      FOR BITE CORRECTION- NO TRAUMA    PARATHYROID GLAND SURGERY  2006    REMOVED 1 GLAND    TOTAL NEPHRECTOMY Bilateral     POLYCYSTIC KIDNEY    VULVAR/PERINEAL BIOPSY N/A 2022    COLPOSCOPY, ULTRASONIC SCAPEL ABLATION OF VULVA performed by Rylee Bonilla MD at [address] Right 10/5/2021    RIGHT PARTIAL ZACK-VULVECTOMY, ULTRASONIC SCALPEL TREATMENT performed by Guillermo Martinez MD at [address] History   Problem Relation Age of Onset    Kidney Disease Mother         polycystic kidney disease. Heart Disease Mother     High Blood Pressure Father     Diabetes Maternal Grandmother     Other Maternal Grandfather         brain aneurysm    Cancer Maternal Uncle         nonhodgkins lymphoma    Kidney Disease Other         multiple aunts and uncles with polycystic kidney       Social History     Tobacco Use    Smoking status: Former     Packs/day: 0.25     Years: 5.00     Pack years: 1.25     Types: Cigarettes     Quit date: 1991     Years since quittin.5    Smokeless tobacco: Never   Substance Use Topics    Alcohol use: Yes     Comment: very seldom. few times a year      Current Outpatient Medications   Medication Sig Dispense Refill    escitalopram (LEXAPRO) 5 MG tablet Take 1 tablet by mouth in the morning.  30 tablet 3    nystatin (MYCOSTATIN) 165270 UNIT/GM powder Apply 3 times daily as needed to affected area 60 g 1    allopurinol (ZYLOPRIM) 100 MG tablet TAKE 1 TABLET BY MOUTH EVERY DAY 30 tablet 6    traZODone (DESYREL) 50 MG tablet Take 1 tablet by mouth nightly 30 tablet 5    magnesium oxide (MAG-OX) 400 MG tablet Take 1 tablet by mouth daily 30 tablet 6    omeprazole (PRILOSEC) 20 MG delayed release capsule Prilosec 20 mg capsule,delayed release   Take 1 capsule as needed by oral route. tacrolimus (PROGRAF) 1 MG capsule 4 tabs bid plus 0.5 mg bid for total of 4.5 mg bid 60 capsule 0    tacrolimus (PROGRAF) 0.5 MG capsule Take 1 capsule by mouth 2 times daily For total of 4.5 mg bid 60 capsule 0    mycophenolate (MYFORTIC) 180 MG DR tablet Take 360 mg by mouth 2 times daily 2 tablets twice daily       No current facility-administered medications for this visit. Allergies   Allergen Reactions    Chloraprep One Step [Chlorhexidine Gluconate] Itching    Morphine Hives     Itching and hives    Seasonal Itching    Cat Hair Extract Itching    Dust Mite Extract Itching       Health Maintenance   Topic Date Due    HIV screen  Never done    Hepatitis C screen  Never done    Shingles vaccine (1 of 2) Never done    Colorectal Cancer Screen  Never done    COVID-19 Vaccine (1) 07/24/2023 (Originally 8/18/1975)    Flu vaccine (1) 09/01/2022    Depression Monitoring  04/08/2023    Breast cancer screen  04/21/2023    Diabetes screen  04/08/2025    Lipids  05/04/2027    DTaP/Tdap/Td vaccine (2 - Td or Tdap) 08/24/2030    Pneumococcal 0-64 years Vaccine (4 - PPSV23 or PCV20) 08/18/2033    Hepatitis A vaccine  Aged Out    Hepatitis B vaccine  Aged Out    Hib vaccine  Aged Out    Meningococcal (ACWY) vaccine  Aged Out       Subjective:      Review of Systems   Constitutional:  Negative for chills and fever. Respiratory:  Negative for cough and shortness of breath. Cardiovascular:  Negative for chest pain and palpitations.    Gastrointestinal:  Negative for diarrhea and nausea. Skin:  Negative for rash. Neurological:  Negative for dizziness and light-headedness. Objective:     /88   Pulse 91   Wt 262 lb 12.8 oz (119.2 kg)   SpO2 97%   BMI 38.81 kg/m²   Physical Exam  Vitals and nursing note reviewed. Constitutional:       General: She is not in acute distress. Appearance: She is well-developed. She is not ill-appearing. HENT:      Head: Normocephalic and atraumatic. Right Ear: External ear normal.      Left Ear: External ear normal.   Eyes:      General: No scleral icterus. Right eye: No discharge. Left eye: No discharge. Conjunctiva/sclera: Conjunctivae normal.   Neck:      Thyroid: No thyromegaly. Trachea: No tracheal deviation. Cardiovascular:      Rate and Rhythm: Normal rate and regular rhythm. Heart sounds: Normal heart sounds. Pulmonary:      Effort: Pulmonary effort is normal. No respiratory distress. Breath sounds: Normal breath sounds. No wheezing. Lymphadenopathy:      Cervical: No cervical adenopathy. Skin:     General: Skin is warm. Findings: No rash. Neurological:      Mental Status: She is alert and oriented to person, place, and time. Psychiatric:         Mood and Affect: Mood normal.         Behavior: Behavior normal.         Thought Content: Thought content normal.       Assessment/Plan:   1. Depression, unspecified depression type  Assessment & Plan:   Well-controlled, continue current medications  2. Hypertension, unspecified type  Assessment & Plan:   Well-controlled, continue current medications  3. Vulvar dysplasia  Assessment & Plan:   Monitored by specialist- no acute findings meriting change in the plan     Return in about 3 months (around 10/25/2022) for HTN f/u, medication f/u. Data Unavailable     No orders of the defined types were placed in this encounter.     Orders Placed This Encounter   Medications    escitalopram (LEXAPRO) 5 MG tablet     Sig: Take 1 tablet by mouth in the morning. Dispense:  30 tablet     Refill:  3       Patient given educational materials - see patient instructions. Discussed use, benefit, and side effects of prescribed medications. All patient questions answered. Pt voiced understanding. Reviewed health maintenance. Instructed to continue current medications, diet and exercise. Patient agreed with treatment plan. Follow up as directed.      Electronically signed by Alexandra Rangel MD on 7/25/2022 at 10:14 AM

## 2022-08-17 RX ORDER — ESCITALOPRAM OXALATE 5 MG/1
TABLET ORAL
Qty: 30 TABLET | Refills: 3 | Status: SHIPPED | OUTPATIENT
Start: 2022-08-17

## 2022-09-09 ENCOUNTER — OFFICE VISIT (OUTPATIENT)
Dept: GYNECOLOGIC ONCOLOGY | Age: 52
End: 2022-09-09
Payer: COMMERCIAL

## 2022-09-09 ENCOUNTER — HOSPITAL ENCOUNTER (OUTPATIENT)
Age: 52
Setting detail: SPECIMEN
Discharge: HOME | End: 2022-09-09

## 2022-09-09 VITALS
SYSTOLIC BLOOD PRESSURE: 137 MMHG | BODY MASS INDEX: 38.66 KG/M2 | HEIGHT: 69 IN | WEIGHT: 261 LBS | DIASTOLIC BLOOD PRESSURE: 87 MMHG | OXYGEN SATURATION: 99 % | HEART RATE: 97 BPM

## 2022-09-09 DIAGNOSIS — Z92.25 H/O IMMUNOSUPPRESSIVE THERAPY: ICD-10-CM

## 2022-09-09 DIAGNOSIS — D07.1 VULVAR INTRAEPITHELIAL NEOPLASIA (VIN) GRADE 3: Primary | ICD-10-CM

## 2022-09-09 PROCEDURE — 56821 COLPOSCOPY VULVA W/BIOPSY: CPT | Performed by: PHYSICIAN ASSISTANT

## 2022-09-09 PROCEDURE — 99214 OFFICE O/P EST MOD 30 MIN: CPT | Performed by: PHYSICIAN ASSISTANT

## 2022-09-09 RX ORDER — LIDOCAINE HYDROCHLORIDE AND EPINEPHRINE 10; 10 MG/ML; UG/ML
1 INJECTION, SOLUTION INFILTRATION; PERINEURAL ONCE
Status: COMPLETED | OUTPATIENT
Start: 2022-09-09 | End: 2022-09-09

## 2022-09-09 RX ADMIN — LIDOCAINE HYDROCHLORIDE AND EPINEPHRINE 1 ML: 10; 10 INJECTION, SOLUTION INFILTRATION; PERINEURAL at 15:40

## 2022-09-09 ASSESSMENT — ENCOUNTER SYMPTOMS
EYES NEGATIVE: 1
GASTROINTESTINAL NEGATIVE: 1
RESPIRATORY NEGATIVE: 1

## 2022-09-09 NOTE — PROGRESS NOTES
Review of Systems   Constitutional: Negative. HENT:  Negative. Eyes: Negative. Respiratory: Negative. Cardiovascular: Negative. Gastrointestinal: Negative. Endocrine: Negative. Genitourinary: Negative. Musculoskeletal: Negative. Skin: Negative. Neurological: Negative. Hematological:  Bruises/bleeds easily.

## 2022-09-09 NOTE — PROGRESS NOTES
[address] [facility], [facility] [address], [address]  [place] [facility] [address] present for her high grade vulvar dysplasia surveillance visit. CC/HPI: She 46 y.o.  female who initially saw her local OB/GYN provider in 2021 for her annual exam she had concerns of a labial soreness that was present for approximately 2 months. She states she was using Neosporin without relief. She has a surgical history pertinent for hysterectomy secondary to \"heavy menses\". Recalls abnormal Pap smears in her 25s and she believes she had to have \"laser therapy\"    Her Gynecologic provider Carolee Quinones NP obtained a vaginal Pap smear on 21 which later revealed low-grade CHARIS, HPV negative. She did return for a colposcopy and biopsy of the labial lesion on 2021 with Dr. Jordan Shepard. The vaginal mucosa was noted to have no abnormal appearances. The lesion of the right labia was biopsied at this time. Vulvar biopsy later returned positive for DARRELL 3 and she was referred to [place] for further evaluation and treatment. Her medical history is significant for polycystic kidney disease and she has a renal transplant receipt currently on long term use of immunosuppressant medication Prograf. She is a former smoker, cessation over 30 years ago    She was seen by [facility] gynecology oncology and ultimately underwent a right natali vulvectomy with a partial vulvectomy using ultrasonic scalpel on 10/6/2021. 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 The margins were treated with ultrasonic scalpel at the time of the excision. She has had follow up surveillance pelvic examinations and colposcopy.      Her follow-up surveillance in 2022 with vulvar colposcopy revealed lesion of the right labia which was biopsied and returned high-grade squamous intraepithelial lesion 2-3. Therefore she return for surgical consultation with GYN oncologist Dr. Renea Turner and ultimately underwent exam under anesthesia, colposcopy with partial vulvectomy using ultrasonic scalpel vulvar lesions. The final pathology was consistent with fragments of high grade squamous intraepithelial lesion (DARRELL 2)    She was seen for follow-up surveillance on March 31 2022 and vulvar colposcopy revealed bilateral vulvar lesions, vulvar biopsy was obtained which revealed moderate dysplasia. She followed up with surgical consultation with Dr. Renea Turner and she ultimately underwent a exam under anesthesia with extensive destruction of right-sided vulvar lesions using ultrasonic scalpel. Final pathology showed fragmented high-grade squamous intraepithelial lesion. Patient presents today for follow-up surveillance. She is well. She has no concerns. She is now due for a follow-up Pap smear given the last Pap smear abnormality 1 year ago with low-grade CHARIS. She reports a history of hysterectomy in the early 2000's for \"heavy bleeding\". She recalls a history of abnormal Pap smear and believed she had to have a procedure in her young 25s. Unfortunately these records will not be on file. She does report she has occasional vulvar irritation. She is not sure if this is normal or due to her chronic condition. She has no vaginal bleeding or discharge. ROS:  I have personally reviewed and agree with the review of systems done by my ancillary staff in the [facility] documentation. Physical Exam:    Vitals:    09/09/22 0818   BP: 137/87   Site: Right Upper Arm   Position: Sitting   Cuff Size: Medium Adult   Pulse: 97   SpO2: 99%   Weight: 261 lb (118.4 kg)   Height: 5' 9\" (1.753 m)       General: well- appearing, no acute distress, alert and oriented x 3    HEENT: Thyroid normal size. No cervical or supraclavicular lymphadenopathy.     Lungs: Clear to auscultate bilaterally to the bases    No CVA tenderness present bilaterally. Heart: Auscultation reveals regular rate and rhythm. No murmurs or gallops appreciated. Abdomen: Soft. No detectable organomegaly. No palpable masses or ascites. No inguinal lymphadenopathy bilaterally. Extremities: No edema, calf tenderness or deformities bilaterally    Pelvic: The patient has normal female external genitalia including, vulva, urethra, vagina, perineum, Bartholin glands. Uterus, bilateral fallopian tubes and adnexae, and cervix are surgically absent. Medium speculum examination reveals no blood or discharge in vaginal vault. Vaginal cuff well-intact with no signs of erythema, induration, separation, or granulation tissue. A pap scraping was taken. The external genitalia was then saturated with acetic acid and vulvar colposcopy was performed under various magnification patterns. There was an identifiable lesion of the right base of the labia minora that appeared to have a punctations. Otherwise there was no overt lesions present. This area was injected with lidocaine and biopsied away with curved scissors. Hemostasis was adequate with silver nitrate. Bimanual examination: Bladder is non-tender, without mass. There are no palpable vaginal nodules and no other pelvic masses, tenderness or pathology noted. Assessment/Plan:  History high grade vulvar dysplasia    History renal transplant, chronic immunosuppressive therapy     Her Surveillance plan is as follows: Will await Pap smear and biopsy result follow-up accordingly    Plan to follow-up in 3 to 4 months for surveillance    Will notify clinic if she becomes symptomatic or other concerns arise    I spent 30 minutes providing care to the patient, counseling and coordinating care, discussing the patient's current situation, reviewing her options, counseling and education her and answering her questions.  The patient's pertinent images, labs, and previous records and procedures were

## 2022-09-13 LAB
CYTOLOGY REPORT: NORMAL
HPV SAMPLE: ABNORMAL
HPV, GENOTYPE 16: DETECTED
HPV, GENOTYPE 18: NOT DETECTED
HPV, HIGH RISK OTHER: NOT DETECTED
HPV, INTERPRETATION: ABNORMAL
SPECIMEN DESCRIPTION: ABNORMAL
SURGICAL PATHOLOGY REPORT: NORMAL

## 2022-09-13 RX ORDER — IMIQUIMOD 12.5 MG/.25G
CREAM TOPICAL
Qty: 12 EACH | Refills: 3 | Status: SHIPPED | OUTPATIENT
Start: 2022-09-13 | End: 2022-09-20

## 2022-09-16 RX ORDER — TRAZODONE HYDROCHLORIDE 50 MG/1
50 TABLET ORAL NIGHTLY
Qty: 90 TABLET | Refills: 1 | Status: SHIPPED | OUTPATIENT
Start: 2022-09-16

## 2022-10-14 ENCOUNTER — PROCEDURE VISIT (OUTPATIENT)
Dept: GYNECOLOGIC ONCOLOGY | Age: 52
End: 2022-10-14
Payer: COMMERCIAL

## 2022-10-14 ENCOUNTER — HOSPITAL ENCOUNTER (OUTPATIENT)
Age: 52
Setting detail: SPECIMEN
Discharge: HOME | End: 2022-10-14

## 2022-10-14 VITALS
TEMPERATURE: 97.7 F | OXYGEN SATURATION: 97 % | DIASTOLIC BLOOD PRESSURE: 85 MMHG | SYSTOLIC BLOOD PRESSURE: 123 MMHG | HEART RATE: 73 BPM | HEIGHT: 69 IN | BODY MASS INDEX: 38.36 KG/M2 | WEIGHT: 259 LBS

## 2022-10-14 DIAGNOSIS — R35.0 URINARY FREQUENCY: ICD-10-CM

## 2022-10-14 DIAGNOSIS — R87.811 VAGINAL HIGH RISK HPV DNA TEST POSITIVE: Primary | ICD-10-CM

## 2022-10-14 DIAGNOSIS — Z12.31 ENCOUNTER FOR SCREENING MAMMOGRAM FOR MALIGNANT NEOPLASM OF BREAST: ICD-10-CM

## 2022-10-14 PROCEDURE — 57420 EXAM OF VAGINA W/SCOPE: CPT | Performed by: PHYSICIAN ASSISTANT

## 2022-10-14 PROCEDURE — 99999 PR OFFICE/OUTPT VISIT,PROCEDURE ONLY: CPT | Performed by: PHYSICIAN ASSISTANT

## 2022-10-14 ASSESSMENT — ENCOUNTER SYMPTOMS
RESPIRATORY NEGATIVE: 1
BACK PAIN: 1
GASTROINTESTINAL NEGATIVE: 1

## 2022-10-14 NOTE — PROGRESS NOTES
[address] [facility], [facility], [address]  [address] present for her high grade vulvar dysplasia surveillance visit. CC/HPI: She 46 y.o.  female who initially saw her local OB/GYN provider in 2021 for her annual exam she had concerns of a labial soreness that was present for approximately 2 months. She states she was using Neosporin without relief. She has a surgical history pertinent for hysterectomy secondary to \"heavy menses\". Recalls abnormal Pap smears in her 25s and she believes she had to have \"laser therapy\"    Her Gynecologic provider Prakash Wesley NP obtained a vaginal Pap smear on 21 which later revealed low-grade CHARIS, HPV negative. She did return for a colposcopy and biopsy of the labial lesion on 2021 with Dr. Moriah Maurice. The vaginal mucosa was noted to have no abnormal appearances. The lesion of the right labia was biopsied at this time. Vulvar biopsy later returned positive for DARRELL 3 and she was referred to [place] for further evaluation and treatment. Her medical history is significant for polycystic kidney disease and she has a renal transplant receipt currently on long term use of immunosuppressant medication Prograf. She is a former smoker, cessation over 30 years ago    She was seen by [facility] gynecology oncology and ultimately underwent a right natali vulvectomy with a partial vulvectomy using ultrasonic scalpel on 10/6/2021. 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 The margins were treated with ultrasonic scalpel at the time of the excision. She has had follow up surveillance pelvic examinations and colposcopy.      Her follow-up surveillance in 2022 with vulvar colposcopy revealed lesion of the right labia which was biopsied and returned high-grade squamous intraepithelial lesion 2-3. Therefore she return for surgical consultation with GYN oncologist Dr. Qamar Arauz and ultimately underwent exam under anesthesia, colposcopy with partial vulvectomy using ultrasonic scalpel vulvar lesions. The final pathology was consistent with fragments of high grade squamous intraepithelial lesion (DARRELL 2)    She was seen for follow-up surveillance on March 31 2022 and vulvar colposcopy revealed bilateral vulvar lesions, vulvar biopsy was obtained which revealed moderate dysplasia. She followed up with surgical consultation with Dr. Qamar Arauz and she ultimately underwent a exam under anesthesia with extensive destruction of right-sided vulvar lesions using ultrasonic scalpel. Final pathology showed fragmented high-grade squamous intraepithelial lesion. It was reviewed the patient's last surveillance visit that she had a previous Pap smear abnormality 2021 with low-grade CHARIS. She reported a history of hysterectomy in the early 2000's for \"heavy bleeding\". She recalls a history of abnormal Pap smear and believed she had to have a procedure in her young 25s. Repeat vaginal Pap smear was obtained in September 2022 which revealed ASCUS with HPV 16 positive. She was asked to return today for a vaginal colposcopy. Today, the patient states she is doing well. She began using her in the chemotherapy to the vulvar region just a few weeks prior and noted to be doing well with no irritation or pain or vaginal bleeding. She denies any vulvar irritation or additional concerns. ROS:  I have personally reviewed and agree with the review of systems done by my ancillary staff in the [facility] documentation.       Physical Exam:    Vitals:    10/14/22 1126   BP: 123/85   Pulse: 73   Temp: 97.7 °F (36.5 °C)   TempSrc: Oral   SpO2: 97%   Weight: 259 lb (117.5 kg)   Height: 5' 9\" (1.753 m)       General: well- appearing, no acute distress, alert and oriented x 3    Pelvic: The patient has normal female external genitalia including, vulva, urethra, vagina, perineum, Bartholin glands. Uterus, bilateral fallopian tubes and adnexae, and cervix are surgically absent. A large speculum examination reveals no blood or discharge in vaginal vault. Vaginal cuff well-intact with no signs of erythema, induration, separation, or granulation tissue. The vaginal mucosa is mildly atrophic. The vaginal canal was then saturated entirely with acetic acid and time was allowed for adequate saturation. A vaginal colposcopy was then performed under various magnification renee and there were no identifiable lesions present. There was no acetowhite uptake throughout the vaginal mucosa, there were no areas of abnormal vessels or punctations noted. Therefore no biopsies were taken. 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 She will call or return to clinic sooner if she has any questions or concerns    I spent 30 minutes providing care to the patient, counseling and coordinating care, discussing the patient's current situation, reviewing her options, counseling and education her and answering her questions. The patient's pertinent images, labs, and previous records and procedures were reviewed.     Electronically signed by Shakila Jacobson PA-C on 10/14/2022 at 11:52 AM EDT

## 2022-10-14 NOTE — PROGRESS NOTES
Review of Systems   Constitutional: Negative. HENT:  Negative. Respiratory: Negative. Cardiovascular: Negative. Gastrointestinal: Negative. Genitourinary: Negative. Musculoskeletal:  Positive for back pain. Skin:  Positive for itching (vaginal). Neurological: Negative. All other systems reviewed and are negative.

## 2022-10-15 LAB
CULTURE: NORMAL
SPECIMEN DESCRIPTION: NORMAL

## 2022-11-23 RX ORDER — ESCITALOPRAM OXALATE 5 MG/1
TABLET ORAL
Qty: 90 TABLET | Refills: 1 | Status: SHIPPED | OUTPATIENT
Start: 2022-11-23

## 2023-01-09 ENCOUNTER — TELEPHONE (OUTPATIENT)
Dept: GYNECOLOGIC ONCOLOGY | Age: 53
End: 2023-01-09

## 2023-01-12 ENCOUNTER — TELEPHONE (OUTPATIENT)
Dept: GYNECOLOGIC ONCOLOGY | Age: 53
End: 2023-01-12

## 2023-01-30 ENCOUNTER — TELEPHONE (OUTPATIENT)
Dept: GYNECOLOGIC ONCOLOGY | Age: 53
End: 2023-01-30

## 2023-01-30 NOTE — TELEPHONE ENCOUNTER
Unable to LVM to reschedule missed 1/12/23 appt, VM full. Can schedule next available with provider.

## 2023-03-23 ENCOUNTER — OFFICE VISIT (OUTPATIENT)
Dept: GYNECOLOGIC ONCOLOGY | Age: 53
End: 2023-03-23

## 2023-03-23 ENCOUNTER — HOSPITAL ENCOUNTER (OUTPATIENT)
Age: 53
Setting detail: SPECIMEN
Discharge: HOME | End: 2023-03-23

## 2023-03-23 VITALS
WEIGHT: 267.8 LBS | OXYGEN SATURATION: 92 % | BODY MASS INDEX: 39.66 KG/M2 | HEIGHT: 69 IN | SYSTOLIC BLOOD PRESSURE: 136 MMHG | DIASTOLIC BLOOD PRESSURE: 86 MMHG | HEART RATE: 95 BPM

## 2023-03-23 DIAGNOSIS — D07.1 VULVAR INTRAEPITHELIAL NEOPLASIA (VIN) GRADE 3: Primary | ICD-10-CM

## 2023-03-23 DIAGNOSIS — Z92.25 H/O IMMUNOSUPPRESSIVE THERAPY: ICD-10-CM

## 2023-03-23 DIAGNOSIS — Z12.11 COLON CANCER SCREENING: ICD-10-CM

## 2023-03-23 DIAGNOSIS — R19.4 CHANGE IN BOWEL HABITS: ICD-10-CM

## 2023-03-23 PROCEDURE — 99999 PR OFFICE/OUTPT VISIT,PROCEDURE ONLY: CPT | Performed by: PHYSICIAN ASSISTANT

## 2023-03-23 RX ORDER — LIDOCAINE HYDROCHLORIDE 10 MG/ML
1 INJECTION, SOLUTION INFILTRATION; PERINEURAL ONCE
Status: COMPLETED | OUTPATIENT
Start: 2023-03-23 | End: 2023-03-23

## 2023-03-23 RX ADMIN — LIDOCAINE HYDROCHLORIDE 1 ML: 10 INJECTION, SOLUTION INFILTRATION; PERINEURAL at 09:10

## 2023-03-23 ASSESSMENT — ENCOUNTER SYMPTOMS
RESPIRATORY NEGATIVE: 1
EYES NEGATIVE: 1
BACK PAIN: 1
GASTROINTESTINAL NEGATIVE: 1

## 2023-03-23 NOTE — Clinical Note
Hi Dr. Linda Marr,  This patient has persistent high-grade vulvar dysplasia with associated symptoms including vulvar itching. Colposcopic impressive today is negative for invasive dx. She has now failed use of topical imiquimod likely due to her immunosuppression d/t renal transplant and on Prograf. I did explain to the patient she would likely be a candidate for EUA, colposcopy with excision of vulvar lesions and ultrasonic scalpel ablation. She has had this procedure with you in April 2022. She is agreeable to proceed with outpatient procedure if you are agreeable to schedule. Please let me know. Thank you!  Carolyne Golden

## 2023-03-23 NOTE — PROGRESS NOTES
Review of Systems   Constitutional: Negative. HENT:  Negative. Eyes: Negative. Respiratory: Negative. Cardiovascular: Negative. Gastrointestinal: Negative. Endocrine: Negative. Genitourinary: Negative. Musculoskeletal:  Positive for back pain and neck pain. Skin:  Positive for itching (vaginal). Neurological:  Positive for extremity weakness (arms). Hematological:  Bruises/bleeds easily.
intraepithelial lesion 2-3. Therefore she return for surgical consultation with GYN oncologist Dr. Sandro Cabral and ultimately underwent exam under anesthesia, colposcopy with partial vulvectomy using ultrasonic scalpel vulvar lesions. The final pathology was consistent with fragments of high grade squamous intraepithelial lesion (DARRELL 2)    She was seen for follow-up surveillance on March 31 2022 and vulvar colposcopy revealed bilateral vulvar lesions, vulvar biopsy was obtained which revealed moderate dysplasia. She followed up with surgical consultation with Dr. Sandro Cabral and she ultimately underwent a exam under anesthesia with extensive destruction of right-sided vulvar lesions using ultrasonic scalpel. Final pathology showed fragmented high-grade squamous intraepithelial lesion. It was reviewed the patient's last surveillance visit that she had a previous Pap smear abnormality 2021 with low-grade CHARIS. She reported a history of hysterectomy in the early 2000's for \"heavy bleeding\". She recalls a history of abnormal Pap smear and believed she had to have a procedure in her young 25s. Repeat vaginal Pap smear was obtained in September 2022 which revealed ASCUS with HPV 16 positive. She returned for a vaginal colposcopy with negative colposcopic impression. She has most recently using topical imiquimod for Biopsy-proven high-grade vulvar dysplasia in September 2022. The patient states she may have used this for several months however unsure of her consistency. Today, the patient states she is doing relatively well. She is caring for her elderly parents with dementia. The patient states she has had associated vulvar itching and discomfort. She denies vaginal or vulvar bleeding. She reports normal urination habits. She does report her bowel habits fluctuate between diarrhea and constipation. She has daily nausea which has been present for many years. She is overdue for screening colonoscopy.   The

## 2023-03-27 ENCOUNTER — PREP FOR PROCEDURE (OUTPATIENT)
Dept: GYNECOLOGIC ONCOLOGY | Age: 53
End: 2023-03-27

## 2023-03-27 RX ORDER — SODIUM CHLORIDE 9 MG/ML
INJECTION, SOLUTION INTRAVENOUS CONTINUOUS
Status: CANCELLED | OUTPATIENT
Start: 2023-03-27

## 2023-03-27 RX ORDER — SODIUM CHLORIDE 0.9 % (FLUSH) 0.9 %
5-40 SYRINGE (ML) INJECTION PRN
Status: CANCELLED | OUTPATIENT
Start: 2023-03-27

## 2023-03-27 RX ORDER — SODIUM CHLORIDE 9 MG/ML
INJECTION, SOLUTION INTRAVENOUS PRN
Status: CANCELLED | OUTPATIENT
Start: 2023-03-27

## 2023-03-27 RX ORDER — SODIUM CHLORIDE 0.9 % (FLUSH) 0.9 %
5-40 SYRINGE (ML) INJECTION EVERY 12 HOURS SCHEDULED
Status: CANCELLED | OUTPATIENT
Start: 2023-03-27

## 2023-03-30 LAB — SURGICAL PATHOLOGY REPORT: NORMAL

## 2023-04-10 PROBLEM — G89.18 POSTOPERATIVE PAIN: Status: ACTIVE | Noted: 2023-04-10

## 2023-05-08 ENCOUNTER — OFFICE VISIT (OUTPATIENT)
Dept: GYNECOLOGIC ONCOLOGY | Age: 53
End: 2023-05-08

## 2023-05-08 VITALS
OXYGEN SATURATION: 96 % | DIASTOLIC BLOOD PRESSURE: 88 MMHG | SYSTOLIC BLOOD PRESSURE: 126 MMHG | HEART RATE: 115 BPM | WEIGHT: 258.9 LBS | HEIGHT: 69 IN | BODY MASS INDEX: 38.34 KG/M2

## 2023-05-08 DIAGNOSIS — N90.3 VULVAR DYSPLASIA: Primary | ICD-10-CM

## 2023-05-08 DIAGNOSIS — Z92.25 H/O IMMUNOSUPPRESSIVE THERAPY: ICD-10-CM

## 2023-05-08 PROCEDURE — 99024 POSTOP FOLLOW-UP VISIT: CPT | Performed by: PHYSICIAN ASSISTANT

## 2023-05-08 ASSESSMENT — ENCOUNTER SYMPTOMS
EYES NEGATIVE: 1
GASTROINTESTINAL NEGATIVE: 1
COUGH: 1

## 2023-05-08 NOTE — PROGRESS NOTES
Review of Systems   Constitutional: Negative. HENT:  Negative. Eyes: Negative. Respiratory:  Positive for cough (cold symptoms). Cardiovascular: Negative. Gastrointestinal: Negative. Endocrine: Positive for hot flashes (night). Genitourinary: Negative. Musculoskeletal: Negative. Skin: Negative. Neurological: Negative. Hematological:  Bruises/bleeds easily.

## 2023-05-08 NOTE — PROGRESS NOTES
[address] [facility], [facility], [address] [facility] Shay Holman is a 45 y.o. female who presents for a Post Operative visit today     CC/HPI: She 46 y.o.  female who initially saw her local OB/GYN provider in 2021 for her annual exam she had concerns of a labial soreness that was present for approximately 2 months. She states she was using Neosporin without relief. She has a surgical history pertinent for hysterectomy secondary to \"heavy menses\". Recalls abnormal Pap smears in her 25s and she believes she had to have \"laser therapy\"    Her Gynecologic provider Emily Pimentel NP obtained a vaginal Pap smear on 21 which later revealed low-grade CHARIS, HPV negative. She did return for a colposcopy and biopsy of the labial lesion on 2021 with Dr. Sarah Ontiveros. The vaginal mucosa was noted to have no abnormal appearances. The lesion of the right labia was biopsied at this time. Vulvar biopsy later returned positive for DARRELL 3 and she was referred to [place] for further evaluation and treatment. Her medical history is significant for polycystic kidney disease and she has a renal transplant receipt currently on long term use of immunosuppressant medication Prograf. She is a former smoker, cessation over 30 years ago    She was seen by [facility] gynecology oncology and ultimately underwent a right natali vulvectomy with a partial vulvectomy using ultrasonic scalpel on 10/6/2021. 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 The margins were treated with ultrasonic scalpel at the time of the excision. She has had follow up surveillance pelvic examinations and colposcopy.      Her follow-up surveillance in 2022 with vulvar colposcopy revealed lesion of the right labia which was biopsied and returned high-grade squamous

## 2023-05-09 ENCOUNTER — HOSPITAL ENCOUNTER (OUTPATIENT)
Dept: MAMMOGRAPHY | Age: 53
Discharge: HOME | End: 2023-05-11
Payer: COMMERCIAL

## 2023-05-09 DIAGNOSIS — Z12.31 ENCOUNTER FOR SCREENING MAMMOGRAM FOR MALIGNANT NEOPLASM OF BREAST: ICD-10-CM

## 2023-05-09 DIAGNOSIS — R92.8 ABNORMAL MAMMOGRAM OF BOTH BREASTS: Primary | ICD-10-CM

## 2023-05-09 PROCEDURE — 77063 BREAST TOMOSYNTHESIS BI: CPT

## 2023-05-10 NOTE — RESULT ENCOUNTER NOTE
Patient returned call and information was relayed along with scheduling's phone number. Patient voiced understanding.

## 2023-05-22 RX ORDER — TRAZODONE HYDROCHLORIDE 50 MG/1
50 TABLET ORAL NIGHTLY
Qty: 90 TABLET | Refills: 1 | OUTPATIENT
Start: 2023-05-22

## 2023-06-06 ENCOUNTER — HOSPITAL ENCOUNTER (OUTPATIENT)
Dept: WOMENS IMAGING | Age: 53
Discharge: HOME | End: 2023-06-08
Payer: COMMERCIAL

## 2023-06-06 ENCOUNTER — ANCILLARY ORDERS (OUTPATIENT)
Dept: GYNECOLOGIC ONCOLOGY | Age: 53
End: 2023-06-06

## 2023-06-06 DIAGNOSIS — R92.8 ABNORMAL MAMMOGRAM OF BOTH BREASTS: ICD-10-CM

## 2023-06-06 DIAGNOSIS — N90.3 VULVAR DYSPLASIA: Primary | ICD-10-CM

## 2023-06-06 DIAGNOSIS — N63.0 FIBROUS BREAST LUMPS: ICD-10-CM

## 2023-06-06 PROCEDURE — G0279 TOMOSYNTHESIS, MAMMO: HCPCS

## 2023-06-06 PROCEDURE — 76642 ULTRASOUND BREAST LIMITED: CPT

## 2023-06-19 ENCOUNTER — OFFICE VISIT (OUTPATIENT)
Dept: GASTROENTEROLOGY | Age: 53
End: 2023-06-19
Payer: COMMERCIAL

## 2023-06-19 VITALS
DIASTOLIC BLOOD PRESSURE: 82 MMHG | BODY MASS INDEX: 40.91 KG/M2 | TEMPERATURE: 97.5 F | SYSTOLIC BLOOD PRESSURE: 126 MMHG | WEIGHT: 277 LBS

## 2023-06-19 DIAGNOSIS — E66.8 MODERATE OBESITY: ICD-10-CM

## 2023-06-19 DIAGNOSIS — R10.13 EPIGASTRIC BURNING SENSATION: ICD-10-CM

## 2023-06-19 DIAGNOSIS — K58.2 IRRITABLE BOWEL SYNDROME WITH BOTH CONSTIPATION AND DIARRHEA: Primary | ICD-10-CM

## 2023-06-19 DIAGNOSIS — K21.9 GASTROESOPHAGEAL REFLUX DISEASE, UNSPECIFIED WHETHER ESOPHAGITIS PRESENT: ICD-10-CM

## 2023-06-19 DIAGNOSIS — Z94.0 RENAL TRANSPLANT, STATUS POST: ICD-10-CM

## 2023-06-19 DIAGNOSIS — K59.09 OTHER CONSTIPATION: ICD-10-CM

## 2023-06-19 DIAGNOSIS — K31.84 GASTROPARESIS: ICD-10-CM

## 2023-06-19 PROBLEM — E66.9 MODERATE OBESITY: Status: ACTIVE | Noted: 2023-06-19

## 2023-06-19 PROCEDURE — 99204 OFFICE O/P NEW MOD 45 MIN: CPT | Performed by: INTERNAL MEDICINE

## 2023-06-19 PROCEDURE — 3074F SYST BP LT 130 MM HG: CPT | Performed by: INTERNAL MEDICINE

## 2023-06-19 PROCEDURE — 3079F DIAST BP 80-89 MM HG: CPT | Performed by: INTERNAL MEDICINE

## 2023-06-19 RX ORDER — POLYETHYLENE GLYCOL 3350, SODIUM SULFATE ANHYDROUS, SODIUM BICARBONATE, SODIUM CHLORIDE, POTASSIUM CHLORIDE 236; 22.74; 6.74; 5.86; 2.97 G/4L; G/4L; G/4L; G/4L; G/4L
4 POWDER, FOR SOLUTION ORAL ONCE
Qty: 4000 ML | Refills: 0 | Status: SHIPPED | OUTPATIENT
Start: 2023-06-19 | End: 2023-06-19

## 2023-06-19 ASSESSMENT — ENCOUNTER SYMPTOMS
SHORTNESS OF BREATH: 0
RECTAL PAIN: 0
DIARRHEA: 0
SORE THROAT: 0
CHOKING: 0
VOMITING: 0
ABDOMINAL DISTENTION: 0
COUGH: 0
ANAL BLEEDING: 0
BLOOD IN STOOL: 0
NAUSEA: 1
VOICE CHANGE: 0
TROUBLE SWALLOWING: 0
WHEEZING: 0
CONSTIPATION: 1
ABDOMINAL PAIN: 1

## 2023-06-19 NOTE — PROGRESS NOTES
GI CLINIC FOLLOW UP    NTERVAL HISTORY:   [address]    Chief Complaint   Patient presents with    Colonoscopy     Pt is here today as a NP to screen for colonoscopy. 1. Irritable bowel syndrome with both constipation and diarrhea    2. Other constipation    3. Moderate obesity    4. Renal transplant, status post      This patient evaluated in my office for the first time  She is a renal transplant patient  Has history for moderate obesity  Complains of chronic intermittent constipation issues  Has been referred for screening colonoscopy  Patient has been complaining of some abdominal pains, off and on cramping  Also complains of abdominal bloating and gas  Has off and on nausea without any sig vomiting  Has some alternating constipation and diarrhea  Has no weight loss  Has some anxiety issues  She denies any smoking alcohol abuse illicit drug usage  Her available records were reviewed with her  Complains of intermittent nausea symptoms some acid reflux symptoms  HISTORY OF PRESENT ILLNESS: Ms.Lorie Bruna Krishnan is a 45 y.o. female with a past history remarkable for , referred for evaluation of   Chief Complaint   Patient presents with    Colonoscopy     Pt is here today as a NP to screen for colonoscopy. .    Past Medical,Family, and Social History reviewed and does contribute to the patient presenting condition. Patient's PMH/PSH,SH,PSYCH Hx, MEDs, ALLERGIES, and ROS were all reviewed and updated in the appropriate sections.     PAST MEDICAL HISTORY:  Past Medical History:   Diagnosis Date    Abdominal pain 05/29/2010    Acute cystitis 10/21/2015    Acute pancreatitis 10/21/2015    Arm pain 10/21/2015    Arm swelling 10/21/2015    Arteriovenous fistula ([facility] [address] 12/12/2014    Arthritis     bilateral knees    COVID-19     Tested positive  home test   slight cold-like symptoms    Depressive disorder 05/29/2010    Dysphagia 07/09/2010

## 2023-08-18 ENCOUNTER — TELEPHONE (OUTPATIENT)
Dept: GASTROENTEROLOGY | Age: 53
End: 2023-08-18

## 2023-08-21 ENCOUNTER — ANESTHESIA (OUTPATIENT)
Dept: OPERATING ROOM | Age: 53
End: 2023-08-21
Payer: COMMERCIAL

## 2023-08-21 ENCOUNTER — ANESTHESIA EVENT (OUTPATIENT)
Dept: OPERATING ROOM | Age: 53
End: 2023-08-21
Payer: COMMERCIAL

## 2023-08-21 ENCOUNTER — HOSPITAL ENCOUNTER (OUTPATIENT)
Age: 53
Setting detail: OUTPATIENT SURGERY
Discharge: HOME | End: 2023-08-21
Attending: INTERNAL MEDICINE | Admitting: INTERNAL MEDICINE
Payer: COMMERCIAL

## 2023-08-21 VITALS
HEIGHT: 69 IN | RESPIRATION RATE: 19 BRPM | SYSTOLIC BLOOD PRESSURE: 125 MMHG | DIASTOLIC BLOOD PRESSURE: 75 MMHG | HEART RATE: 81 BPM | OXYGEN SATURATION: 98 % | BODY MASS INDEX: 39.1 KG/M2 | TEMPERATURE: 96.8 F | WEIGHT: 264 LBS

## 2023-08-21 DIAGNOSIS — K58.2 IRRITABLE BOWEL SYNDROME WITH BOTH CONSTIPATION AND DIARRHEA: ICD-10-CM

## 2023-08-21 DIAGNOSIS — E66.8 MODERATE OBESITY: ICD-10-CM

## 2023-08-21 DIAGNOSIS — K59.09 OTHER CONSTIPATION: ICD-10-CM

## 2023-08-21 DIAGNOSIS — Z94.0 RENAL TRANSPLANT, STATUS POST: ICD-10-CM

## 2023-08-21 DIAGNOSIS — K21.9 GASTROESOPHAGEAL REFLUX DISEASE, UNSPECIFIED WHETHER ESOPHAGITIS PRESENT: ICD-10-CM

## 2023-08-21 PROCEDURE — 88305 TISSUE EXAM BY PATHOLOGIST: CPT

## 2023-08-21 PROCEDURE — 43239 EGD BIOPSY SINGLE/MULTIPLE: CPT | Performed by: INTERNAL MEDICINE

## 2023-08-21 PROCEDURE — 6360000002 HC RX W HCPCS: Performed by: NURSE ANESTHETIST, CERTIFIED REGISTERED

## 2023-08-21 PROCEDURE — 3700000000 HC ANESTHESIA ATTENDED CARE: Performed by: INTERNAL MEDICINE

## 2023-08-21 PROCEDURE — 6360000002 HC RX W HCPCS: Performed by: ANESTHESIOLOGY

## 2023-08-21 PROCEDURE — 2709999900 HC NON-CHARGEABLE SUPPLY: Performed by: INTERNAL MEDICINE

## 2023-08-21 PROCEDURE — 3609012400 HC EGD TRANSORAL BIOPSY SINGLE/MULTIPLE: Performed by: INTERNAL MEDICINE

## 2023-08-21 PROCEDURE — 2580000003 HC RX 258: Performed by: NURSE ANESTHETIST, CERTIFIED REGISTERED

## 2023-08-21 PROCEDURE — 7100000010 HC PHASE II RECOVERY - FIRST 15 MIN: Performed by: INTERNAL MEDICINE

## 2023-08-21 PROCEDURE — 45380 COLONOSCOPY AND BIOPSY: CPT | Performed by: INTERNAL MEDICINE

## 2023-08-21 PROCEDURE — 2500000003 HC RX 250 WO HCPCS: Performed by: NURSE ANESTHETIST, CERTIFIED REGISTERED

## 2023-08-21 PROCEDURE — 7100000011 HC PHASE II RECOVERY - ADDTL 15 MIN: Performed by: INTERNAL MEDICINE

## 2023-08-21 PROCEDURE — 3700000001 HC ADD 15 MINUTES (ANESTHESIA): Performed by: INTERNAL MEDICINE

## 2023-08-21 PROCEDURE — 3609010600 HC COLONOSCOPY POLYPECTOMY SNARE/COLD BIOPSY: Performed by: INTERNAL MEDICINE

## 2023-08-21 RX ORDER — SODIUM CHLORIDE 0.9 % (FLUSH) 0.9 %
5-40 SYRINGE (ML) INJECTION PRN
Status: DISCONTINUED | OUTPATIENT
Start: 2023-08-21 | End: 2023-08-21 | Stop reason: HOSPADM

## 2023-08-21 RX ORDER — SODIUM CHLORIDE 0.9 % (FLUSH) 0.9 %
5-40 SYRINGE (ML) INJECTION PRN
Status: CANCELLED | OUTPATIENT
Start: 2023-08-21

## 2023-08-21 RX ORDER — SODIUM CHLORIDE 0.9 % (FLUSH) 0.9 %
5-40 SYRINGE (ML) INJECTION EVERY 12 HOURS SCHEDULED
Status: CANCELLED | OUTPATIENT
Start: 2023-08-21

## 2023-08-21 RX ORDER — SODIUM CHLORIDE 9 MG/ML
INJECTION, SOLUTION INTRAVENOUS PRN
Status: DISCONTINUED | OUTPATIENT
Start: 2023-08-21 | End: 2023-08-21 | Stop reason: HOSPADM

## 2023-08-21 RX ORDER — LIDOCAINE HYDROCHLORIDE 10 MG/ML
1 INJECTION, SOLUTION EPIDURAL; INFILTRATION; INTRACAUDAL; PERINEURAL
Status: DISCONTINUED | OUTPATIENT
Start: 2023-08-22 | End: 2023-08-21 | Stop reason: HOSPADM

## 2023-08-21 RX ORDER — LIDOCAINE HYDROCHLORIDE 20 MG/ML
INJECTION, SOLUTION INFILTRATION; PERINEURAL PRN
Status: DISCONTINUED | OUTPATIENT
Start: 2023-08-21 | End: 2023-08-21 | Stop reason: SDUPTHER

## 2023-08-21 RX ORDER — SODIUM CHLORIDE, SODIUM LACTATE, POTASSIUM CHLORIDE, CALCIUM CHLORIDE 600; 310; 30; 20 MG/100ML; MG/100ML; MG/100ML; MG/100ML
INJECTION, SOLUTION INTRAVENOUS CONTINUOUS PRN
Status: DISCONTINUED | OUTPATIENT
Start: 2023-08-21 | End: 2023-08-21 | Stop reason: SDUPTHER

## 2023-08-21 RX ORDER — SODIUM CHLORIDE 0.9 % (FLUSH) 0.9 %
5-40 SYRINGE (ML) INJECTION EVERY 12 HOURS SCHEDULED
Status: DISCONTINUED | OUTPATIENT
Start: 2023-08-21 | End: 2023-08-21 | Stop reason: HOSPADM

## 2023-08-21 RX ORDER — ONDANSETRON 2 MG/ML
4 INJECTION INTRAMUSCULAR; INTRAVENOUS
Status: CANCELLED | OUTPATIENT
Start: 2023-08-21 | End: 2023-08-22

## 2023-08-21 RX ORDER — SODIUM CHLORIDE 9 MG/ML
INJECTION, SOLUTION INTRAVENOUS CONTINUOUS
Status: DISCONTINUED | OUTPATIENT
Start: 2023-08-21 | End: 2023-08-21

## 2023-08-21 RX ORDER — FENTANYL CITRATE 50 UG/ML
25 INJECTION, SOLUTION INTRAMUSCULAR; INTRAVENOUS EVERY 5 MIN PRN
Status: CANCELLED | OUTPATIENT
Start: 2023-08-21

## 2023-08-21 RX ORDER — SODIUM CHLORIDE 9 MG/ML
INJECTION, SOLUTION INTRAVENOUS PRN
Status: CANCELLED | OUTPATIENT
Start: 2023-08-21

## 2023-08-21 RX ORDER — MIDAZOLAM HYDROCHLORIDE 1 MG/ML
2 INJECTION INTRAMUSCULAR; INTRAVENOUS
Status: COMPLETED | OUTPATIENT
Start: 2023-08-21 | End: 2023-08-21

## 2023-08-21 RX ORDER — PROPOFOL 10 MG/ML
INJECTION, EMULSION INTRAVENOUS PRN
Status: DISCONTINUED | OUTPATIENT
Start: 2023-08-21 | End: 2023-08-21 | Stop reason: SDUPTHER

## 2023-08-21 RX ORDER — SODIUM CHLORIDE, SODIUM LACTATE, POTASSIUM CHLORIDE, CALCIUM CHLORIDE 600; 310; 30; 20 MG/100ML; MG/100ML; MG/100ML; MG/100ML
INJECTION, SOLUTION INTRAVENOUS CONTINUOUS
Status: DISCONTINUED | OUTPATIENT
Start: 2023-08-21 | End: 2023-08-21 | Stop reason: HOSPADM

## 2023-08-21 RX ORDER — HYDROMORPHONE HYDROCHLORIDE 1 MG/ML
0.5 INJECTION, SOLUTION INTRAMUSCULAR; INTRAVENOUS; SUBCUTANEOUS EVERY 5 MIN PRN
Status: CANCELLED | OUTPATIENT
Start: 2023-08-21

## 2023-08-21 RX ADMIN — PROPOFOL 50 MG: 10 INJECTION, EMULSION INTRAVENOUS at 11:51

## 2023-08-21 RX ADMIN — PROPOFOL 50 MG: 10 INJECTION, EMULSION INTRAVENOUS at 12:20

## 2023-08-21 RX ADMIN — SODIUM CHLORIDE, POTASSIUM CHLORIDE, SODIUM LACTATE AND CALCIUM CHLORIDE: 600; 310; 30; 20 INJECTION, SOLUTION INTRAVENOUS at 11:45

## 2023-08-21 RX ADMIN — PROPOFOL 50 MG: 10 INJECTION, EMULSION INTRAVENOUS at 11:52

## 2023-08-21 RX ADMIN — PROPOFOL 50 MG: 10 INJECTION, EMULSION INTRAVENOUS at 11:55

## 2023-08-21 RX ADMIN — LIDOCAINE HYDROCHLORIDE 80 MG: 20 INJECTION, SOLUTION INFILTRATION; PERINEURAL at 11:48

## 2023-08-21 RX ADMIN — PROPOFOL 50 MG: 10 INJECTION, EMULSION INTRAVENOUS at 12:18

## 2023-08-21 RX ADMIN — PROPOFOL 50 MG: 10 INJECTION, EMULSION INTRAVENOUS at 12:15

## 2023-08-21 RX ADMIN — PROPOFOL 50 MG: 10 INJECTION, EMULSION INTRAVENOUS at 11:59

## 2023-08-21 RX ADMIN — MIDAZOLAM 2 MG: 1 INJECTION INTRAMUSCULAR; INTRAVENOUS at 11:28

## 2023-08-21 RX ADMIN — PROPOFOL 100 MG: 10 INJECTION, EMULSION INTRAVENOUS at 11:48

## 2023-08-21 RX ADMIN — PROPOFOL 50 MG: 10 INJECTION, EMULSION INTRAVENOUS at 12:10

## 2023-08-21 RX ADMIN — PROPOFOL 50 MG: 10 INJECTION, EMULSION INTRAVENOUS at 12:03

## 2023-08-21 RX ADMIN — PROPOFOL 50 MG: 10 INJECTION, EMULSION INTRAVENOUS at 12:12

## 2023-08-21 RX ADMIN — PROPOFOL 50 MG: 10 INJECTION, EMULSION INTRAVENOUS at 12:06

## 2023-08-21 ASSESSMENT — PAIN SCALES - GENERAL: PAINLEVEL_OUTOF10: 2

## 2023-08-21 ASSESSMENT — PAIN DESCRIPTION - LOCATION: LOCATION: ABDOMEN

## 2023-08-21 ASSESSMENT — PAIN DESCRIPTION - DESCRIPTORS: DESCRIPTORS: CRAMPING

## 2023-08-21 ASSESSMENT — PAIN - FUNCTIONAL ASSESSMENT: PAIN_FUNCTIONAL_ASSESSMENT: 0-10

## 2023-08-21 NOTE — H&P
History and Physical Service   [facility] [place] - INPATIENT    HISTORY AND PHYSICAL EXAMINATION            Date of Evaluation: 8/21/2023  Patient name:  Jose Thacker  MRN:   [id number]  YOB: 1970  PCP:    Nathaniel Douglas MD    History Obtained From:     Patient, medical records    History of Present Illness: This is Jose Thacker a 52 y.o. female with a significant past medical history managed by multiple specialists who presents today for a EGD, COLONOSCOPY, EGD ESOPHAGOGASTRODUODENOSCOPY by Matthew Moya MD for Irritable bowel syndrome with both constipation and diarrhea; Other consti*. Patient denies recent bowel changes. She does have intermittent bloating, nausea, and gas as well as diarrhea alternating with constipation. She denies vomiting or unintentional weight loss. Patient followed bowel prep until watery clear. Has had previous colonoscopy and EGD. No FH colon cancer or polyps. Denies fever, chills, shortness of breath, cough, congestion, wheezing, chest pain, open sores or wounds. Denies hx of diabetes. Denies any blood thinning medications. Last Tacrolimus 08/20/2023, last Myfortic 08/20/2023. Past Medical History:     Past Medical History:   Diagnosis Date    Abdominal pain 05/29/2010    Acute cystitis 10/21/2015    Acute pancreatitis 10/21/2015    Arm pain 10/21/2015    Arm swelling 10/21/2015    Arteriovenous fistula ([address]) 12/12/2014    Arthritis     bilateral knees    COVID-19     Tested positive  home test   slight cold-like symptoms    Depressive disorder 05/29/2010    Dysphagia 07/09/2010    Dysuria 10/21/2015    End-stage renal disease ([address]) 05/29/2010    Fistula 10/21/2015    Gout     Hemodialysis patient ([address])     [id number].      History of kidney transplant 10/21/2015    sees nephrologist Dr. Naga Stewart last visit 2022    History of renal transplant 03/27/2014    Hypertension     Malaise and fatigue 07/09/2010    Polycystic kidney disease

## 2023-08-21 NOTE — OP NOTE
PROCEDURE NOTE    DATE OF PROCEDURE: 8/21/2023    SURGEON: Climmie Gottron, MD    ASSISTANT: None    PREOPERATIVE DIAGNOSIS: ABD PAINS  SCREENING  CONSTIPATION   FOR RENAL TX    POSTOPERATIVE DIAGNOSIS: as described below    OPERATION: Total colonoscopy   COLD BIOPSY POLYPECTOMIES X 3  ANESTHESIA: MAC PER ANESTHESIA     ESTIMATED BLOOD LOSS: less than 50     COMPLICATIONS: None. SPECIMENS:  Was Obtained:     HISTORY: The patient is a 52y.o. year old female with history of above preop diagnosis. I recommended colonoscopy with possible biopsy or polypectomy and I explained the risk, benefits, expected outcome, and alternatives to the procedure. Risks included but are not limited to bleeding, infection, respiratory distress, hypotension, and perforation of the colon and possibility of missing a lesion. The patient understands and is in agreement. PROCEDURE: The patient was given IV conscious sedation. The patient's SPO2 remained above 90% throughout the procedure. The colonoscope was inserted per rectum and advanced under direct vision to the cecum with difficulty. The prep was fair TO GOOD    REDUNDANT COLON   PRESSURES WERE APPLIED TO REACH CECUM    RETAINED CHUNKY STOOLS    Findings:  Terminal ileum: normal    Cecum/Ascending colon: normal    Transverse colon: abnormal: A 4-5 MM POLYP WAS EXCISED WITH JUMBO BIOPSY FORCEPS    Descending/Sigmoid colon: abnormal: TWO 4 MM POLYPS WERE REMOVED WITH JUMBO BIOPSY FORCEPS  SCATTERED DIVERTICULA    Rectum/Anus: examined in normal and retroflexed positions and was abnormal: INT HEMORRHOIDS    Withdrawal Time was (minutes): 15    The colon was decompressed and the scope was removed. The patient tolerated the procedure well.      Recommendations/Plan:   Lifestyle and dietary modifications as discussed  F/U Biopsies  F/U In Office in 3-4 weeks  Discussed with the family  Colonoscopy Recall :3 year  POST SEDATION PATIENT WAS STABLE WITH STABLE VITAL SIGNS AND OXYGEN

## 2023-08-21 NOTE — OP NOTE
PROCEDURE NOTE    DATE OF PROCEDURE: 8/21/2023     SURGEON: Janel Trent MD    ASSISTANT: None    PREOPERATIVE DIAGNOSIS: GERD  IBS  ABD PAINS    POSTOPERATIVE DIAGNOSIS: As described below    OPERATION: Upper GI endoscopy with Biopsy    ANESTHESIA: MAC PER ANESTHESIA     ESTIMATED BLOOD LOSS: Less than 50 ml    COMPLICATIONS: None. SPECIMENS:  Was Obtained:     HISTORY: The patient is a 52y.o. year old female with history of above preop diagnosis. I recommended esophagogastroduodenoscopy with possible biopsy and I explained the risk, benefits, expected outcome, and alternatives to the procedure. Risks included but are not limited to bleeding, infection, respiratory distress, hypotension, and perforation of the esophagus, stomach, or duodenum. Patient understands and is in agreement. PROCEDURE: The patient was given IV conscious sedation. The patient's SPO2 remained above 90% throughout the procedure. The gastroscope was inserted orally and advanced under direct vision through the esophagus, through the stomach, through the pylorus, and into the descending duodenum. Findings:    Retropharyngeal area was grossly normal appearing    Esophagus: normal  SPASMATIC CONTRACTIONS    Stomach:    Fundus: normal    Body: normal    Antrum: abnormal: MILD EROSIVE GASTRITIS WAS BIOPSIED    Duodenum:     Descending: normal  RANDOM BIOPSIES     Bulb: normal    The scope was removed and the patient tolerated the procedure well.      Recommendations/Plan:   F/U Biopsies  F/U In Office in 3-4 weeks  Discussed with the family  Post sedation patient was stable with stable vital signs and stable O2 saturations    Electronically signed by Janel Trent MD  on 8/21/2023 at 11:58 AM

## 2023-08-21 NOTE — ANESTHESIA POSTPROCEDURE EVALUATION
Department of Anesthesiology  Postprocedure Note    Patient: Alex Maravilla  MRN: [id number]  YOB: 1970  Date of evaluation: 8/21/2023      Procedure Summary     Date: 08/21/23 Room / Location: [place] [id number] / [facility] - INPATIENT    Anesthesia Start: 0215 Anesthesia Stop: 1225    Procedures:       COLONOSCOPY POLYPECTOMY SNARE/COLD BIOPSY (Rectum)      EGD BIOPSY (Mouth) Diagnosis:       Irritable bowel syndrome with both constipation and diarrhea      Other constipation      Moderate obesity      Renal transplant, status post      Gastroesophageal reflux disease, unspecified whether esophagitis present      (Irritable bowel syndrome with both constipation and diarrhea [K58.2])      (Other constipation [K59.09])      (Moderate obesity [E66.8])      (Renal transplant, status post [Z94.0])      (Gastroesophageal reflux disease, unspecified whether esophagitis present [K21.9])    Surgeons: Carlene Michael MD Responsible Provider: Mukul Melo MD    Anesthesia Type: General ASA Status: 3          Anesthesia Type: General    Nate Phase I:      Nate Phase II: Nate Score: 10      Anesthesia Post Evaluation    Patient location during evaluation: PACU  Patient participation: complete - patient participated  Level of consciousness: awake and alert  Airway patency: patent  Nausea & Vomiting: no nausea and no vomiting  Complications: no  Cardiovascular status: hemodynamically stable  Respiratory status: acceptable  Hydration status: euvolemic  Pain management: adequate

## 2023-08-24 LAB — SURGICAL PATHOLOGY REPORT: NORMAL

## 2023-08-26 ENCOUNTER — HOSPITAL ENCOUNTER (OUTPATIENT)
Age: 53
Discharge: HOME | End: 2023-08-26
Payer: COMMERCIAL

## 2023-08-26 LAB
BACTERIA URNS QL MICRO: ABNORMAL
BASOPHILS # BLD: 0 K/UL (ref 0–0.2)
BASOPHILS NFR BLD: 0 % (ref 0–2)
BILIRUB UR QL STRIP: NEGATIVE
CASTS #/AREA URNS LPF: ABNORMAL /LPF
CLARITY UR: ABNORMAL
COLOR UR: YELLOW
EOSINOPHIL # BLD: 0.3 K/UL (ref 0–0.4)
EOSINOPHILS RELATIVE PERCENT: 4 % (ref 0–4)
EPI CELLS #/AREA URNS HPF: ABNORMAL /HPF
ERYTHROCYTE [DISTWIDTH] IN BLOOD BY AUTOMATED COUNT: 13.5 % (ref 11.5–14.9)
GLUCOSE UR STRIP-MCNC: NEGATIVE MG/DL
HCT VFR BLD AUTO: 40.7 % (ref 36–46)
HGB BLD-MCNC: 14.2 G/DL (ref 12–16)
HGB UR QL STRIP.AUTO: NEGATIVE
KETONES UR STRIP-MCNC: ABNORMAL MG/DL
LEUKOCYTE ESTERASE UR QL STRIP: NEGATIVE
LYMPHOCYTES NFR BLD: 2 K/UL (ref 1–4.8)
LYMPHOCYTES RELATIVE PERCENT: 27 % (ref 24–44)
MAGNESIUM SERPL-MCNC: 1.7 MG/DL (ref 1.6–2.6)
MCH RBC QN AUTO: 30.6 PG (ref 26–34)
MCHC RBC AUTO-ENTMCNC: 34.8 G/DL (ref 31–37)
MCV RBC AUTO: 88.1 FL (ref 80–100)
MONOCYTES NFR BLD: 0.5 K/UL (ref 0.1–1.3)
MONOCYTES NFR BLD: 6 % (ref 1–7)
NEUTROPHILS NFR BLD: 63 % (ref 36–66)
NEUTS SEG NFR BLD: 4.6 K/UL (ref 1.3–9.1)
NITRITE UR QL STRIP: NEGATIVE
PH UR STRIP: 5 [PH] (ref 5–8)
PHOSPHATE SERPL-MCNC: 3.6 MG/DL (ref 2.6–4.5)
PLATELET # BLD AUTO: 192 K/UL (ref 150–450)
PMV BLD AUTO: 8.5 FL (ref 6–12)
PROT UR STRIP-MCNC: ABNORMAL MG/DL
RBC # BLD AUTO: 4.62 M/UL (ref 4–5.2)
RBC #/AREA URNS HPF: ABNORMAL /HPF
SP GR UR STRIP: 1.02 (ref 1–1.03)
UROBILINOGEN UR STRIP-ACNC: NORMAL EU/DL (ref 0–1)
WBC #/AREA URNS HPF: ABNORMAL /HPF
WBC OTHER # BLD: 7.5 K/UL (ref 3.5–11)

## 2023-08-26 PROCEDURE — 85025 COMPLETE CBC W/AUTO DIFF WBC: CPT

## 2023-08-26 PROCEDURE — 84100 ASSAY OF PHOSPHORUS: CPT

## 2023-08-26 PROCEDURE — 36415 COLL VENOUS BLD VENIPUNCTURE: CPT

## 2023-08-26 PROCEDURE — 83735 ASSAY OF MAGNESIUM: CPT

## 2023-08-26 PROCEDURE — 81001 URINALYSIS AUTO W/SCOPE: CPT

## 2023-08-30 ENCOUNTER — HOSPITAL ENCOUNTER (OUTPATIENT)
Age: 53
Discharge: HOME | End: 2023-08-30
Payer: COMMERCIAL

## 2023-08-30 DIAGNOSIS — I12.9 BENIGN HYPERTENSION WITH CKD (CHRONIC KIDNEY DISEASE), STAGE II: ICD-10-CM

## 2023-08-30 DIAGNOSIS — Q61.2 AUTOSOMAL DOMINANT POLYCYSTIC KIDNEY DISEASE: ICD-10-CM

## 2023-08-30 DIAGNOSIS — N18.2 CKD (CHRONIC KIDNEY DISEASE), STAGE II: ICD-10-CM

## 2023-08-30 DIAGNOSIS — N18.2 BENIGN HYPERTENSION WITH CKD (CHRONIC KIDNEY DISEASE), STAGE II: ICD-10-CM

## 2023-08-30 DIAGNOSIS — Z94.0 RENAL TRANSPLANT RECIPIENT: ICD-10-CM

## 2023-08-30 LAB
ANION GAP SERPL CALCULATED.3IONS-SCNC: 9 MMOL/L (ref 9–17)
BUN SERPL-MCNC: 14 MG/DL (ref 6–20)
CALCIUM SERPL-MCNC: 9.2 MG/DL (ref 8.6–10.4)
CHLORIDE SERPL-SCNC: 105 MMOL/L (ref 98–107)
CO2 SERPL-SCNC: 23 MMOL/L (ref 20–31)
CREAT SERPL-MCNC: 1 MG/DL (ref 0.5–0.9)
GFR SERPL CREATININE-BSD FRML MDRD: >60 ML/MIN/1.73M2
GLUCOSE SERPL-MCNC: 118 MG/DL (ref 70–99)
POTASSIUM SERPL-SCNC: 4.6 MMOL/L (ref 3.7–5.3)
SODIUM SERPL-SCNC: 137 MMOL/L (ref 135–144)

## 2023-08-30 PROCEDURE — 80048 BASIC METABOLIC PNL TOTAL CA: CPT

## 2023-08-30 PROCEDURE — 36415 COLL VENOUS BLD VENIPUNCTURE: CPT

## 2023-08-30 PROCEDURE — 80197 ASSAY OF TACROLIMUS: CPT

## 2023-09-01 LAB — TACROLIMUS BLD-MCNC: 5.5 NG/ML

## 2023-09-07 DIAGNOSIS — R10.13 EPIGASTRIC BURNING SENSATION: ICD-10-CM

## 2023-09-07 DIAGNOSIS — K21.9 GASTROESOPHAGEAL REFLUX DISEASE, UNSPECIFIED WHETHER ESOPHAGITIS PRESENT: ICD-10-CM

## 2023-09-07 DIAGNOSIS — K59.09 OTHER CONSTIPATION: ICD-10-CM

## 2023-09-07 DIAGNOSIS — Z94.0 RENAL TRANSPLANT, STATUS POST: ICD-10-CM

## 2023-09-07 DIAGNOSIS — E66.8 MODERATE OBESITY: ICD-10-CM

## 2023-09-07 DIAGNOSIS — K58.2 IRRITABLE BOWEL SYNDROME WITH BOTH CONSTIPATION AND DIARRHEA: ICD-10-CM

## 2023-09-11 ENCOUNTER — OFFICE VISIT (OUTPATIENT)
Dept: GYNECOLOGIC ONCOLOGY | Age: 53
End: 2023-09-11
Payer: COMMERCIAL

## 2023-09-11 ENCOUNTER — HOSPITAL ENCOUNTER (OUTPATIENT)
Age: 53
Setting detail: SPECIMEN
Discharge: HOME | End: 2023-09-11

## 2023-09-11 VITALS
HEIGHT: 69 IN | SYSTOLIC BLOOD PRESSURE: 131 MMHG | OXYGEN SATURATION: 98 % | BODY MASS INDEX: 38.83 KG/M2 | WEIGHT: 262.2 LBS | DIASTOLIC BLOOD PRESSURE: 88 MMHG | HEART RATE: 97 BPM

## 2023-09-11 DIAGNOSIS — Z12.89 ENCOUNTER FOR PELVIC SCREENING FOR CANCER: ICD-10-CM

## 2023-09-11 DIAGNOSIS — R87.811 VAGINAL HIGH RISK HPV DNA TEST POSITIVE: ICD-10-CM

## 2023-09-11 DIAGNOSIS — N39.3 STRESS INCONTINENCE: ICD-10-CM

## 2023-09-11 DIAGNOSIS — N90.3 VULVAR DYSPLASIA: Primary | ICD-10-CM

## 2023-09-11 PROCEDURE — 56820 COLPOSCOPY VULVA: CPT | Performed by: PHYSICIAN ASSISTANT

## 2023-09-11 ASSESSMENT — ENCOUNTER SYMPTOMS
CONSTIPATION: 1
DIARRHEA: 1
EYES NEGATIVE: 1
RESPIRATORY NEGATIVE: 1

## 2023-09-11 NOTE — PROGRESS NOTES
[address] [facility], [facility], [address]  [address] [address] Terra Valles is a 52 y.o. female who presents for follow up    CC/HPI: She is a  female who initially saw her local OB/GYN provider in 2021 for her annual exam she had concerns of a labial soreness that was present for approximately 2 months. She has a surgical history pertinent for hysterectomy secondary to \"heavy menses\". Recalls abnormal Pap smears in her 25s and she believes she had to have \"laser therapy\". Her Gynecologic provider Chirag Miranda NP obtained a vaginal Pap smear on 21 which later revealed low-grade CHARIS, HPV negative. Vulvar biopsy later returned positive for DARRLEL 3 and she was referred to [address] [place] [address] for further evaluation and treatment. Her medical history is significant for polycystic kidney disease and she has a renal transplant receipt currently on long term use of immunosuppressant medication Prograf. She is a former smoker, cessation over 30 years ago    She was seen by [facility] gynecology oncology and ultimately underwent a right natali vulvectomy with a partial vulvectomy using ultrasonic scalpel on 10/6/2021. 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 The margins were treated with ultrasonic scalpel at the time of the excision. She has had follow up surveillance pelvic examinations and colposcopy with serial visits and treatment for her vulvar dysplasia. 2022 underwent exam under anesthesia, colposcopy with partial vulvectomy using ultrasonic scalpel vulvar lesions. 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 Vaginal colposcopy with negative colposcopic impression.     She has most

## 2023-09-11 NOTE — PROGRESS NOTES
Review of Systems   Constitutional: Negative. HENT:  Negative. Eyes: Negative. Respiratory: Negative. Cardiovascular: Negative. Gastrointestinal:  Positive for constipation and diarrhea. Endocrine: Negative. Genitourinary:  Positive for difficulty urinating (pressure). Skin: Negative. Neurological:  Positive for headaches. Hematological:  Bruises/bleeds easily.

## 2023-09-15 LAB
HPV I/H RISK 4 DNA CVX QL NAA+PROBE: NOT DETECTED
HPV SAMPLE: NORMAL
HPV, INTERPRETATION: NORMAL
HPV16 DNA CVX QL NAA+PROBE: NOT DETECTED
HPV18 DNA CVX QL NAA+PROBE: NOT DETECTED
SPECIMEN DESCRIPTION: NORMAL

## 2023-09-20 LAB — CYTOLOGY REPORT: NORMAL

## 2023-10-05 ENCOUNTER — OFFICE VISIT (OUTPATIENT)
Dept: GASTROENTEROLOGY | Age: 53
End: 2023-10-05
Payer: COMMERCIAL

## 2023-10-05 VITALS
WEIGHT: 265 LBS | DIASTOLIC BLOOD PRESSURE: 84 MMHG | SYSTOLIC BLOOD PRESSURE: 130 MMHG | TEMPERATURE: 97.3 F | BODY MASS INDEX: 39.13 KG/M2

## 2023-10-05 DIAGNOSIS — Z94.0 RENAL TRANSPLANT RECIPIENT: ICD-10-CM

## 2023-10-05 DIAGNOSIS — K31.84 GASTROPARESIS: ICD-10-CM

## 2023-10-05 DIAGNOSIS — E66.8 MODERATE OBESITY: ICD-10-CM

## 2023-10-05 DIAGNOSIS — K59.09 OTHER CONSTIPATION: ICD-10-CM

## 2023-10-05 DIAGNOSIS — D36.9 TUBULAR ADENOMA: Primary | ICD-10-CM

## 2023-10-05 DIAGNOSIS — K58.2 IRRITABLE BOWEL SYNDROME WITH BOTH CONSTIPATION AND DIARRHEA: ICD-10-CM

## 2023-10-05 DIAGNOSIS — K21.9 GASTROESOPHAGEAL REFLUX DISEASE, UNSPECIFIED WHETHER ESOPHAGITIS PRESENT: ICD-10-CM

## 2023-10-05 PROCEDURE — 3075F SYST BP GE 130 - 139MM HG: CPT | Performed by: INTERNAL MEDICINE

## 2023-10-05 PROCEDURE — 99214 OFFICE O/P EST MOD 30 MIN: CPT | Performed by: INTERNAL MEDICINE

## 2023-10-05 PROCEDURE — 3079F DIAST BP 80-89 MM HG: CPT | Performed by: INTERNAL MEDICINE

## 2023-10-05 RX ORDER — OMEPRAZOLE 20 MG/1
CAPSULE, DELAYED RELEASE ORAL
Qty: 90 CAPSULE | Refills: 2 | Status: SHIPPED | OUTPATIENT
Start: 2023-10-05

## 2023-10-05 ASSESSMENT — ENCOUNTER SYMPTOMS
RECTAL PAIN: 0
BLOOD IN STOOL: 0
SORE THROAT: 0
ABDOMINAL PAIN: 0
ANAL BLEEDING: 0
SHORTNESS OF BREATH: 0
COUGH: 0
WHEEZING: 0
CHOKING: 0
CONSTIPATION: 1
VOMITING: 0
VOICE CHANGE: 0
NAUSEA: 1
DIARRHEA: 0
ABDOMINAL DISTENTION: 0
TROUBLE SWALLOWING: 0

## 2023-10-05 NOTE — PROGRESS NOTES
GI CLINIC FOLLOW UP    NTERVAL HISTORY:   No referring provider defined for this encounter. Chief Complaint   Patient presents with    Results     Pt is here today for a f/u from colonoscopy/EGD procedure done on 8/21/23. 1. Tubular adenoma    2. Gastroesophageal reflux disease, unspecified whether esophagitis present    3. Moderate obesity    4. Other constipation    5. Irritable bowel syndrome with both constipation and diarrhea    6. Gastroparesis    7. Renal transplant recipient       The patient is here as a follow up of her recent GI procedure. The results have been sent to you separately   The findings were explained to the patient in detail and biopsies were also discussed   with her    This patient had a recent upper endoscopy and colonoscopy by myself    She has history of renal transplant many years ago    Was found to have multiple polyps biopsy which showed to be tubular adenomas    She has history for acid reflux    Upper endoscopy revealed spasmodic contraction of the esophagus and mild erosive gastritis was noted random biopsies from the small bowel were negative for celiac sprue    She gives history for gastroparesis long time ago still complains of some epigastric pain and discomfort and some irritable bowel syndrome-like symptom    This denies any rectal bleeding denies any melanotic stools    No smoking alcohol abuse illicit drug usage    Available records were reviewed with her    HISTORY OF PRESENT ILLNESS: Lalitha Joseph is a 52 y.o. female with a past history remarkable for , referred for evaluation of   Chief Complaint   Patient presents with    Results     Pt is here today for a f/u from colonoscopy/EGD procedure done on 8/21/23. Diamond Hamman Past Medical,Family, and Social History reviewed and does contribute to the patient presenting condition. Patient's PMH/PSH,SH,PSYCH Hx, MEDs, ALLERGIES, and ROS were all reviewed and updated in the appropriate sections.     PAST

## 2023-12-12 ENCOUNTER — HOSPITAL ENCOUNTER (OUTPATIENT)
Dept: WOMENS IMAGING | Age: 53
Discharge: HOME | End: 2023-12-14
Payer: COMMERCIAL

## 2023-12-12 DIAGNOSIS — N63.0 FIBROUS BREAST LUMPS: ICD-10-CM

## 2023-12-12 DIAGNOSIS — Z12.31 SCREENING MAMMOGRAM FOR BREAST CANCER: Primary | ICD-10-CM

## 2023-12-12 PROCEDURE — G0279 TOMOSYNTHESIS, MAMMO: HCPCS

## 2023-12-14 ENCOUNTER — OFFICE VISIT (OUTPATIENT)
Dept: GYNECOLOGIC ONCOLOGY | Age: 53
End: 2023-12-14

## 2023-12-14 ENCOUNTER — HOSPITAL ENCOUNTER (OUTPATIENT)
Age: 53
Setting detail: SPECIMEN
Discharge: HOME | End: 2023-12-14

## 2023-12-14 VITALS
SYSTOLIC BLOOD PRESSURE: 138 MMHG | OXYGEN SATURATION: 99 % | HEART RATE: 95 BPM | WEIGHT: 259 LBS | HEIGHT: 69 IN | DIASTOLIC BLOOD PRESSURE: 78 MMHG | BODY MASS INDEX: 38.36 KG/M2

## 2023-12-14 DIAGNOSIS — B37.9 CANDIDA INFECTION: ICD-10-CM

## 2023-12-14 DIAGNOSIS — N90.3 VULVAR DYSPLASIA: Primary | ICD-10-CM

## 2023-12-14 RX ORDER — NYSTATIN 100000 [USP'U]/G
POWDER TOPICAL
Qty: 60 G | Refills: 1 | Status: SHIPPED | OUTPATIENT
Start: 2023-12-14

## 2023-12-14 NOTE — PROGRESS NOTES
[address] [facility], [facility], [address]  [address] [facility] [address] [place] Rg is a 52 y.o. female who presents for follow up    CC/HPI: She is a  female who initially saw her local OB/GYN provider in 2021 for her annual exam she had concerns of a labial soreness that was present for approximately 2 months. She has a surgical history pertinent for hysterectomy secondary to \"heavy menses\". Recalls abnormal Pap smears in her 25s and she believes she had to have \"laser therapy\". Her Gynecologic provider Sanam Barriga NP obtained a vaginal Pap smear on 21 which later revealed low-grade CHARIS, HPV negative. Vulvar biopsy later returned positive for DARRELL 3 and she was referred to [address] [place] [address] for further evaluation and treatment. Her medical history is significant for polycystic kidney disease and she has a renal transplant receipt currently on long term use of immunosuppressant medication Prograf. She is a former smoker, cessation over 30 years ago    She was seen by [facility] gynecology oncology and ultimately underwent a right natali vulvectomy with a partial vulvectomy using ultrasonic scalpel on 10/6/2021. 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 The margins were treated with ultrasonic scalpel at the time of the excision. She has had follow up surveillance pelvic examinations and colposcopy with serial visits and treatment for her vulvar dysplasia. 2022 underwent exam under anesthesia, colposcopy with partial vulvectomy using ultrasonic scalpel vulvar lesions. 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 Vaginal colposcopy with negative colposcopic impression.     She has most

## 2023-12-14 NOTE — PROGRESS NOTES
Review of Systems   Constitutional: Negative. HENT:  Negative. Eyes: Negative. Respiratory:  Positive for cough (cold 2 weeks ago). Cardiovascular: Negative. Gastrointestinal: Negative. Endocrine: Negative. Genitourinary: Negative. Musculoskeletal: Negative. Skin: Negative. Neurological:  Positive for light-headedness. Hematological:  Bruises/bleeds easily.

## 2024-01-25 ENCOUNTER — OFFICE VISIT (OUTPATIENT)
Dept: GYNECOLOGIC ONCOLOGY | Age: 54
End: 2024-01-25
Payer: COMMERCIAL

## 2024-01-25 VITALS
OXYGEN SATURATION: 97 % | SYSTOLIC BLOOD PRESSURE: 119 MMHG | WEIGHT: 261.8 LBS | TEMPERATURE: 97 F | BODY MASS INDEX: 38.66 KG/M2 | HEART RATE: 109 BPM | DIASTOLIC BLOOD PRESSURE: 75 MMHG

## 2024-01-25 DIAGNOSIS — E66.9 OBESITY (BMI 35.0-39.9 WITHOUT COMORBIDITY): ICD-10-CM

## 2024-01-25 DIAGNOSIS — N90.3 VULVAR DYSPLASIA: Primary | ICD-10-CM

## 2024-01-25 DIAGNOSIS — Z92.25 H/O IMMUNOSUPPRESSIVE THERAPY: ICD-10-CM

## 2024-01-25 PROCEDURE — 3074F SYST BP LT 130 MM HG: CPT | Performed by: OBSTETRICS & GYNECOLOGY

## 2024-01-25 PROCEDURE — 3078F DIAST BP <80 MM HG: CPT | Performed by: OBSTETRICS & GYNECOLOGY

## 2024-01-25 PROCEDURE — 99214 OFFICE O/P EST MOD 30 MIN: CPT | Performed by: OBSTETRICS & GYNECOLOGY

## 2024-01-25 ASSESSMENT — ENCOUNTER SYMPTOMS
RECTAL PAIN: 0
ABDOMINAL DISTENTION: 0
SORE THROAT: 0
WHEEZING: 0
COUGH: 0
TROUBLE SWALLOWING: 0
HEMOPTYSIS: 0
VOMITING: 0
NAUSEA: 1
ABDOMINAL PAIN: 0
VOICE CHANGE: 0
SHORTNESS OF BREATH: 0
SCLERAL ICTERUS: 0
EYE PROBLEMS: 0
DIARRHEA: 0
CHEST TIGHTNESS: 0
CONSTIPATION: 0
BACK PAIN: 0
BLOOD IN STOOL: 0

## 2024-01-25 NOTE — PROGRESS NOTES
Gyn Oncology Note    Patient seen and counseled by me today  Chart notes records labs path and history revd by me in detail today  I answered all of her questions to her satisfaction  I explained her situation options and risks in detail today  She tells me that she understands all that I explained to her today    She declines a vulva, vaginal exam today  She has no pain or new changes compared to the exam by the PA last month  Her overall physical exam today is unchanged    I spent 25 minutes in the office today managing this case    Plan:    Recurrent DARRELL 2/3 (small volume) in an overweight woman with history of transplantation on chronic immunosuppressive therapy  Schedule her for EUA, colposcopy and CO2 laser vaporization of vulva lesions in the weeks or months ahead  Call or return with any new questions or concerns before the outpatient operation      CHAGO Sahu MD

## 2024-01-25 NOTE — PROGRESS NOTES
Daiana Mahmood is a 53 y.o. female that presents today for:  Chief Complaint   Patient presents with    Follow-up     pre-op discussion         HPI:  The patient is a 53-year-old female who presents today for preoperative discussion and planning.    Patient underwent a vaginal Pap smear on 8/18/2021 which ultimately revealed a low-grade CHARIS with negative HPV cotest.  Vulvar biopsy subsequently taken revealed DARRELL 3 and the patient was referred to gynecological oncology for further evaluation.  Decision was made to perform a right hemicolectomy with partial lobectomy using ultrasonic scalpel on 10/6/21 with final pathology revealing high-grade severe dysplasia of the right hemipelvectomy specimen and dysplasia noted on the vaginal side margin.  These margins were further ablated with an ultrasonic scalpel time excision.    In April 2022 patient was reevaluated by exam under anesthesia colposcopy and partial vulvectomy with final pathology returning as fragments of DARRELL 2.  Subsequent Pap smear in September 2022 revealed ASCUS with HPV 16 positive. Vaginal colposcopy with negative impression was appreciated.    The patient was placed on imiquimod topical therapy in September 2022, once again underwent follow-up possibly in March 2023 with biopsies once again revealing high-grade vulvar dysplasia.  The patient elected to proceed with treatment and underwent left vaginal apex biopsy with CO2 laser vaporization of vulvar dysplasia in April 2023.    Patient last presented on 12/14/2023, and during surveillance exam was noted to have multiple lesions including inferior apex of vagina and right labia.  Multiple biopsies were taken with the following results: 1) right labial biopsy positive for DARRELL 2, 3 with parakeratosis and keratosis 2) secondary right labial biopsy positive for DARRELL 2, 3 keratosis keratosis.    Past medical and surgical history significant for a hysterectomy secondary to the heavy menstrual bleeding,

## 2024-01-25 NOTE — PROGRESS NOTES
Review of Systems   Constitutional:  Negative for appetite change, chills, diaphoresis, fatigue, fever and unexpected weight change.   HENT:   Negative for hearing loss, lump/mass, mouth sores, nosebleeds, sore throat, tinnitus, trouble swallowing and voice change.    Eyes:  Negative for eye problems and icterus.   Respiratory:  Negative for chest tightness, cough, hemoptysis, shortness of breath and wheezing.    Cardiovascular:  Negative for chest pain, leg swelling and palpitations.   Gastrointestinal:  Positive for nausea. Negative for abdominal distention, abdominal pain, blood in stool, constipation, diarrhea, rectal pain and vomiting.   Endocrine: Positive for hot flashes (occasionally at night).   Genitourinary:  Positive for dyspareunia (external discomfort at times). Negative for bladder incontinence, difficulty urinating, dysuria, frequency, hematuria, menstrual problem, nocturia, pelvic pain, vaginal bleeding and vaginal discharge.    Musculoskeletal:  Negative for arthralgias, back pain, flank pain, gait problem, myalgias, neck pain and neck stiffness.   Skin:  Positive for itching (vulvar). Negative for rash and wound.   Neurological:  Negative for dizziness, extremity weakness, gait problem, headaches, light-headedness, numbness, seizures and speech difficulty.   Hematological:  Negative for adenopathy. Bruises/bleeds easily (bruise).   Psychiatric/Behavioral:  Negative for confusion, decreased concentration, depression, sleep disturbance and suicidal ideas. The patient is nervous/anxious.

## 2024-01-26 ENCOUNTER — TELEPHONE (OUTPATIENT)
Dept: GYNECOLOGIC ONCOLOGY | Age: 54
End: 2024-01-26

## 2024-01-26 NOTE — TELEPHONE ENCOUNTER
Alivia notified patient of surgery dates and times    Surgery 2/19/24@1:30pm arrive at 1130am   PAT same day  Post Op 2/28/24@1030am with A YOLIS nielsen    Patient made aware that time and day possible change,    Patient requested a Monday as her  only has Monday off as he is a .    Patient was given all information pamphlet will be mailed out. Patient voiced understanding

## 2024-01-29 ENCOUNTER — HOSPITAL ENCOUNTER (OUTPATIENT)
Dept: NUCLEAR MEDICINE | Age: 54
Discharge: HOME | End: 2024-01-31
Payer: COMMERCIAL

## 2024-01-29 ENCOUNTER — PREP FOR PROCEDURE (OUTPATIENT)
Dept: GYNECOLOGIC ONCOLOGY | Age: 54
End: 2024-01-29

## 2024-01-29 DIAGNOSIS — K21.9 GASTROESOPHAGEAL REFLUX DISEASE, UNSPECIFIED WHETHER ESOPHAGITIS PRESENT: ICD-10-CM

## 2024-01-29 DIAGNOSIS — E66.8 MODERATE OBESITY: ICD-10-CM

## 2024-01-29 DIAGNOSIS — K58.2 IRRITABLE BOWEL SYNDROME WITH BOTH CONSTIPATION AND DIARRHEA: ICD-10-CM

## 2024-01-29 DIAGNOSIS — K59.09 OTHER CONSTIPATION: ICD-10-CM

## 2024-01-29 DIAGNOSIS — D36.9 TUBULAR ADENOMA: ICD-10-CM

## 2024-01-29 PROCEDURE — A9541 TC99M SULFUR COLLOID: HCPCS | Performed by: INTERNAL MEDICINE

## 2024-01-29 PROCEDURE — 3430000000 HC RX DIAGNOSTIC RADIOPHARMACEUTICAL: Performed by: INTERNAL MEDICINE

## 2024-01-29 PROCEDURE — 78264 GASTRIC EMPTYING IMG STUDY: CPT

## 2024-01-29 RX ORDER — SODIUM CHLORIDE 9 MG/ML
INJECTION, SOLUTION INTRAVENOUS CONTINUOUS
Status: CANCELLED | OUTPATIENT
Start: 2024-01-29

## 2024-01-29 RX ORDER — SODIUM CHLORIDE 0.9 % (FLUSH) 0.9 %
5-40 SYRINGE (ML) INJECTION PRN
Status: CANCELLED | OUTPATIENT
Start: 2024-01-29

## 2024-01-29 RX ORDER — TECHNETIUM TC 99M SULFUR COLLOID 2 MG
1 KIT MISCELLANEOUS ONCE
Status: COMPLETED | OUTPATIENT
Start: 2024-01-29 | End: 2024-01-29

## 2024-01-29 RX ORDER — SODIUM CHLORIDE 9 MG/ML
INJECTION, SOLUTION INTRAVENOUS PRN
Status: CANCELLED | OUTPATIENT
Start: 2024-01-29

## 2024-01-29 RX ORDER — SODIUM CHLORIDE 0.9 % (FLUSH) 0.9 %
5-40 SYRINGE (ML) INJECTION EVERY 12 HOURS SCHEDULED
Status: CANCELLED | OUTPATIENT
Start: 2024-01-29

## 2024-01-29 RX ADMIN — TECHNETIUM TC 99M SULFUR COLLOID 1 MILLICURIE: KIT at 08:02

## 2024-01-30 ENCOUNTER — HOSPITAL ENCOUNTER (OUTPATIENT)
Dept: NUCLEAR MEDICINE | Age: 54
Discharge: HOME | End: 2024-02-01
Payer: COMMERCIAL

## 2024-01-30 DIAGNOSIS — K58.2 IRRITABLE BOWEL SYNDROME WITH BOTH CONSTIPATION AND DIARRHEA: ICD-10-CM

## 2024-01-30 DIAGNOSIS — K21.00 GASTROESOPHAGEAL REFLUX DISEASE WITH ESOPHAGITIS, UNSPECIFIED WHETHER HEMORRHAGE: ICD-10-CM

## 2024-01-30 DIAGNOSIS — E66.8 MODERATE OBESITY: ICD-10-CM

## 2024-01-30 DIAGNOSIS — D36.9 TUBULAR ADENOMA: ICD-10-CM

## 2024-01-30 DIAGNOSIS — K59.09 OTHER CONSTIPATION: ICD-10-CM

## 2024-01-30 PROCEDURE — A9541 TC99M SULFUR COLLOID: HCPCS | Performed by: INTERNAL MEDICINE

## 2024-01-30 PROCEDURE — 78264 GASTRIC EMPTYING IMG STUDY: CPT

## 2024-01-30 PROCEDURE — 3430000000 HC RX DIAGNOSTIC RADIOPHARMACEUTICAL: Performed by: INTERNAL MEDICINE

## 2024-01-30 RX ORDER — TECHNETIUM TC 99M SULFUR COLLOID 2 MG
1 KIT MISCELLANEOUS ONCE
Status: COMPLETED | OUTPATIENT
Start: 2024-01-30 | End: 2024-01-30

## 2024-01-30 RX ADMIN — TECHNETIUM TC 99M SULFUR COLLOID 1.1 MILLICURIE: KIT at 07:34

## 2024-02-18 NOTE — H&P
GYN/Onc Pre-Op H&P  [facility]    Patient Name: Daiana Mahmood     Patient : 1970  Room/Bed: [facility] OR [facility]/NONE  Admission Date/Time: 2024 11:20 AM  Primary Care Physician: Brittnee Gauthier MD  MRN: [id number]    Date: 2024  Time: 2:07 PM    The patient was seen in pre-op holding. She is here for exam under anesthesia, vulvar colposcopy, CO2 laser vaporization of vulvar lesion.    Please refer to Progress Note dated 24 by Dr. Sahu for further details. There have been no changes to health history or medications since this visit.    The procedure risks and complications were reviewed.  The labs, Consent, and H&P were reviewed and updated.  The patient was counseled on the possibility of  the need of a second surgery.  The patient voiced understanding and had all of her questions answered. The possibility of incomplete removal of abnormal tissue was discussed.      ALLERGIES:  Allergies as of 2024 - Fully Reviewed 2024   Allergen Reaction Noted    Chloraprep one step [chlorhexidine gluconate] Itching 10/21/2015    Morphine Hives 10/21/2015    Seasonal Itching 2019    Cat hair extract Itching 2019    Dust mite extract Itching 10/21/2015       MEDICATIONS:  Current Facility-Administered Medications   Medication Dose Route Frequency Provider Last Rate Last Admin    0.9 % sodium chloride infusion   IntraVENous Continuous Delia Desai, PA-C        sodium chloride flush 0.9 % injection 5-40 mL  5-40 mL IntraVENous 2 times per day Delia Desai PA-PIERRE        sodium chloride flush 0.9 % injection 5-40 mL  5-40 mL IntraVENous PRN Delia Desai, PA-C        0.9 % sodium chloride infusion   IntraVENous PRN Delia Desai PA-C        lactated ringers IV soln infusion   IntraVENous Continuous Howard Quijano  mL/hr at 24 1339 NoRateChange at 24 1339    midazolam PF (VERSED) injection 2 mg  2 mg IntraVENous Once Howard Quijano MD

## 2024-02-19 ENCOUNTER — ANESTHESIA EVENT (OUTPATIENT)
Dept: OPERATING ROOM | Age: 54
End: 2024-02-19
Payer: COMMERCIAL

## 2024-02-19 ENCOUNTER — HOSPITAL ENCOUNTER (OUTPATIENT)
Age: 54
Setting detail: OUTPATIENT SURGERY
Discharge: HOME | End: 2024-02-19
Attending: OBSTETRICS & GYNECOLOGY | Admitting: OBSTETRICS & GYNECOLOGY
Payer: COMMERCIAL

## 2024-02-19 ENCOUNTER — APPOINTMENT (OUTPATIENT)
Dept: GENERAL RADIOLOGY | Age: 54
End: 2024-02-19
Attending: OBSTETRICS & GYNECOLOGY
Payer: COMMERCIAL

## 2024-02-19 ENCOUNTER — ANESTHESIA (OUTPATIENT)
Dept: OPERATING ROOM | Age: 54
End: 2024-02-19
Payer: COMMERCIAL

## 2024-02-19 VITALS
TEMPERATURE: 97.6 F | BODY MASS INDEX: 38.06 KG/M2 | OXYGEN SATURATION: 96 % | DIASTOLIC BLOOD PRESSURE: 74 MMHG | HEIGHT: 69 IN | WEIGHT: 257 LBS | HEART RATE: 58 BPM | RESPIRATION RATE: 15 BRPM | SYSTOLIC BLOOD PRESSURE: 130 MMHG

## 2024-02-19 DIAGNOSIS — G89.18 POST-OP PAIN: Primary | ICD-10-CM

## 2024-02-19 LAB
ALBUMIN SERPL-MCNC: 3.9 G/DL (ref 3.5–5.2)
ALBUMIN/GLOB SERPL: 1.2 {RATIO} (ref 1–2.5)
ALP SERPL-CCNC: 74 U/L (ref 35–104)
ALT SERPL-CCNC: 20 U/L (ref 5–33)
ANION GAP SERPL CALCULATED.3IONS-SCNC: 8 MMOL/L (ref 9–17)
AST SERPL-CCNC: 17 U/L
BASOPHILS # BLD: 0.05 K/UL (ref 0–0.2)
BASOPHILS NFR BLD: 1 % (ref 0–2)
BILIRUB SERPL-MCNC: 0.5 MG/DL (ref 0.3–1.2)
BUN SERPL-MCNC: 17 MG/DL (ref 6–20)
CALCIUM SERPL-MCNC: 9.3 MG/DL (ref 8.6–10.4)
CHLORIDE SERPL-SCNC: 105 MMOL/L (ref 98–107)
CO2 SERPL-SCNC: 23 MMOL/L (ref 20–31)
CREAT SERPL-MCNC: 0.9 MG/DL (ref 0.5–0.9)
EOSINOPHIL # BLD: 0.26 K/UL (ref 0–0.44)
EOSINOPHILS RELATIVE PERCENT: 4 % (ref 1–4)
ERYTHROCYTE [DISTWIDTH] IN BLOOD BY AUTOMATED COUNT: 13.2 % (ref 11.8–14.4)
GFR SERPL CREATININE-BSD FRML MDRD: >60 ML/MIN/1.73M2
GLUCOSE SERPL-MCNC: 97 MG/DL (ref 70–99)
HCT VFR BLD AUTO: 39.1 % (ref 36.3–47.1)
HGB BLD-MCNC: 13.3 G/DL (ref 11.9–15.1)
IMM GRANULOCYTES # BLD AUTO: <0.03 K/UL (ref 0–0.3)
IMM GRANULOCYTES NFR BLD: 0 %
LYMPHOCYTES NFR BLD: 2.05 K/UL (ref 1.1–3.7)
LYMPHOCYTES RELATIVE PERCENT: 35 % (ref 24–43)
MCH RBC QN AUTO: 29.8 PG (ref 25.2–33.5)
MCHC RBC AUTO-ENTMCNC: 34 G/DL (ref 28.4–34.8)
MCV RBC AUTO: 87.5 FL (ref 82.6–102.9)
MONOCYTES NFR BLD: 0.32 K/UL (ref 0.1–1.2)
MONOCYTES NFR BLD: 6 % (ref 3–12)
NEUTROPHILS NFR BLD: 54 % (ref 36–65)
NEUTS SEG NFR BLD: 3.18 K/UL (ref 1.5–8.1)
NRBC BLD-RTO: 0 PER 100 WBC
PLATELET # BLD AUTO: 208 K/UL (ref 138–453)
PMV BLD AUTO: 10.3 FL (ref 8.1–13.5)
POTASSIUM SERPL-SCNC: 4.5 MMOL/L (ref 3.7–5.3)
PROT SERPL-MCNC: 7.2 G/DL (ref 6.4–8.3)
RBC # BLD AUTO: 4.47 M/UL (ref 3.95–5.11)
SODIUM SERPL-SCNC: 136 MMOL/L (ref 135–144)
WBC OTHER # BLD: 5.9 K/UL (ref 3.5–11.3)

## 2024-02-19 PROCEDURE — 3600000014 HC SURGERY LEVEL 4 ADDTL 15MIN: Performed by: OBSTETRICS & GYNECOLOGY

## 2024-02-19 PROCEDURE — 2580000003 HC RX 258: Performed by: OBSTETRICS & GYNECOLOGY

## 2024-02-19 PROCEDURE — 2580000003 HC RX 258: Performed by: ANESTHESIOLOGY

## 2024-02-19 PROCEDURE — 6360000002 HC RX W HCPCS: Performed by: NURSE ANESTHETIST, CERTIFIED REGISTERED

## 2024-02-19 PROCEDURE — 2500000003 HC RX 250 WO HCPCS: Performed by: NURSE ANESTHETIST, CERTIFIED REGISTERED

## 2024-02-19 PROCEDURE — 7100000011 HC PHASE II RECOVERY - ADDTL 15 MIN: Performed by: OBSTETRICS & GYNECOLOGY

## 2024-02-19 PROCEDURE — 7100000000 HC PACU RECOVERY - FIRST 15 MIN: Performed by: OBSTETRICS & GYNECOLOGY

## 2024-02-19 PROCEDURE — 80053 COMPREHEN METABOLIC PANEL: CPT

## 2024-02-19 PROCEDURE — 93005 ELECTROCARDIOGRAM TRACING: CPT | Performed by: PHYSICIAN ASSISTANT

## 2024-02-19 PROCEDURE — 3700000001 HC ADD 15 MINUTES (ANESTHESIA): Performed by: OBSTETRICS & GYNECOLOGY

## 2024-02-19 PROCEDURE — 3700000000 HC ANESTHESIA ATTENDED CARE: Performed by: OBSTETRICS & GYNECOLOGY

## 2024-02-19 PROCEDURE — 7100000001 HC PACU RECOVERY - ADDTL 15 MIN: Performed by: OBSTETRICS & GYNECOLOGY

## 2024-02-19 PROCEDURE — 6370000000 HC RX 637 (ALT 250 FOR IP): Performed by: ANESTHESIOLOGY

## 2024-02-19 PROCEDURE — 7100000010 HC PHASE II RECOVERY - FIRST 15 MIN: Performed by: OBSTETRICS & GYNECOLOGY

## 2024-02-19 PROCEDURE — 2709999900 HC NON-CHARGEABLE SUPPLY: Performed by: OBSTETRICS & GYNECOLOGY

## 2024-02-19 PROCEDURE — 56515 DESTROY VULVA LESION/S COMPL: CPT | Performed by: OBSTETRICS & GYNECOLOGY

## 2024-02-19 PROCEDURE — 85025 COMPLETE CBC W/AUTO DIFF WBC: CPT

## 2024-02-19 PROCEDURE — 2500000003 HC RX 250 WO HCPCS: Performed by: OBSTETRICS & GYNECOLOGY

## 2024-02-19 PROCEDURE — 71046 X-RAY EXAM CHEST 2 VIEWS: CPT

## 2024-02-19 PROCEDURE — 3600000004 HC SURGERY LEVEL 4 BASE: Performed by: OBSTETRICS & GYNECOLOGY

## 2024-02-19 PROCEDURE — 6370000000 HC RX 637 (ALT 250 FOR IP): Performed by: OBSTETRICS & GYNECOLOGY

## 2024-02-19 PROCEDURE — 6360000002 HC RX W HCPCS: Performed by: ANESTHESIOLOGY

## 2024-02-19 RX ORDER — MIDAZOLAM HYDROCHLORIDE 2 MG/2ML
2 INJECTION, SOLUTION INTRAMUSCULAR; INTRAVENOUS ONCE
Status: COMPLETED | OUTPATIENT
Start: 2024-02-19 | End: 2024-02-19

## 2024-02-19 RX ORDER — SODIUM CHLORIDE 0.9 % (FLUSH) 0.9 %
5-40 SYRINGE (ML) INJECTION EVERY 12 HOURS SCHEDULED
Status: DISCONTINUED | OUTPATIENT
Start: 2024-02-19 | End: 2024-02-19 | Stop reason: HOSPADM

## 2024-02-19 RX ORDER — LIDOCAINE HYDROCHLORIDE 20 MG/ML
JELLY TOPICAL ONCE
Status: SHIPPED | OUTPATIENT
Start: 2024-02-19

## 2024-02-19 RX ORDER — SODIUM CHLORIDE 0.9 % (FLUSH) 0.9 %
5-40 SYRINGE (ML) INJECTION PRN
Status: DISCONTINUED | OUTPATIENT
Start: 2024-02-19 | End: 2024-02-19 | Stop reason: HOSPADM

## 2024-02-19 RX ORDER — DEXAMETHASONE SODIUM PHOSPHATE 10 MG/ML
INJECTION INTRAMUSCULAR; INTRAVENOUS PRN
Status: DISCONTINUED | OUTPATIENT
Start: 2024-02-19 | End: 2024-02-19 | Stop reason: SDUPTHER

## 2024-02-19 RX ORDER — SODIUM CHLORIDE, SODIUM LACTATE, POTASSIUM CHLORIDE, CALCIUM CHLORIDE 600; 310; 30; 20 MG/100ML; MG/100ML; MG/100ML; MG/100ML
INJECTION, SOLUTION INTRAVENOUS CONTINUOUS
Status: DISCONTINUED | OUTPATIENT
Start: 2024-02-19 | End: 2024-02-19 | Stop reason: HOSPADM

## 2024-02-19 RX ORDER — MEPERIDINE HYDROCHLORIDE 50 MG/ML
12.5 INJECTION INTRAMUSCULAR; INTRAVENOUS; SUBCUTANEOUS EVERY 5 MIN PRN
Status: DISCONTINUED | OUTPATIENT
Start: 2024-02-19 | End: 2024-02-19 | Stop reason: HOSPADM

## 2024-02-19 RX ORDER — SODIUM CHLORIDE 9 MG/ML
INJECTION, SOLUTION INTRAVENOUS PRN
Status: DISCONTINUED | OUTPATIENT
Start: 2024-02-19 | End: 2024-02-19 | Stop reason: HOSPADM

## 2024-02-19 RX ORDER — SODIUM CHLORIDE 9 MG/ML
INJECTION, SOLUTION INTRAVENOUS CONTINUOUS
Status: DISCONTINUED | OUTPATIENT
Start: 2024-02-19 | End: 2024-02-19 | Stop reason: HOSPADM

## 2024-02-19 RX ORDER — HYDROCODONE BITARTRATE AND ACETAMINOPHEN 5; 325 MG/1; MG/1
1 TABLET ORAL EVERY 8 HOURS PRN
Qty: 10 TABLET | Refills: 0 | Status: SHIPPED | OUTPATIENT
Start: 2024-02-19 | End: 2024-02-24

## 2024-02-19 RX ORDER — DROPERIDOL 2.5 MG/ML
0.62 INJECTION, SOLUTION INTRAMUSCULAR; INTRAVENOUS
Status: DISCONTINUED | OUTPATIENT
Start: 2024-02-19 | End: 2024-02-19 | Stop reason: HOSPADM

## 2024-02-19 RX ORDER — MAGNESIUM HYDROXIDE 1200 MG/15ML
LIQUID ORAL PRN
Status: DISCONTINUED | OUTPATIENT
Start: 2024-02-19 | End: 2024-02-19 | Stop reason: ALTCHOICE

## 2024-02-19 RX ORDER — METOCLOPRAMIDE HYDROCHLORIDE 5 MG/ML
10 INJECTION INTRAMUSCULAR; INTRAVENOUS
Status: DISCONTINUED | OUTPATIENT
Start: 2024-02-19 | End: 2024-02-19 | Stop reason: HOSPADM

## 2024-02-19 RX ORDER — PROPOFOL 10 MG/ML
INJECTION, EMULSION INTRAVENOUS PRN
Status: DISCONTINUED | OUTPATIENT
Start: 2024-02-19 | End: 2024-02-19 | Stop reason: SDUPTHER

## 2024-02-19 RX ORDER — LIDOCAINE HYDROCHLORIDE 10 MG/ML
INJECTION, SOLUTION EPIDURAL; INFILTRATION; INTRACAUDAL; PERINEURAL PRN
Status: DISCONTINUED | OUTPATIENT
Start: 2024-02-19 | End: 2024-02-19 | Stop reason: SDUPTHER

## 2024-02-19 RX ORDER — FENTANYL CITRATE 50 UG/ML
INJECTION, SOLUTION INTRAMUSCULAR; INTRAVENOUS PRN
Status: DISCONTINUED | OUTPATIENT
Start: 2024-02-19 | End: 2024-02-19 | Stop reason: SDUPTHER

## 2024-02-19 RX ORDER — HYDRALAZINE HYDROCHLORIDE 20 MG/ML
10 INJECTION INTRAMUSCULAR; INTRAVENOUS
Status: DISCONTINUED | OUTPATIENT
Start: 2024-02-19 | End: 2024-02-19 | Stop reason: HOSPADM

## 2024-02-19 RX ORDER — ACETIC ACID 5 %
LIQUID (ML) MISCELLANEOUS PRN
Status: DISCONTINUED | OUTPATIENT
Start: 2024-02-19 | End: 2024-02-19 | Stop reason: ALTCHOICE

## 2024-02-19 RX ORDER — ONDANSETRON 2 MG/ML
INJECTION INTRAMUSCULAR; INTRAVENOUS PRN
Status: DISCONTINUED | OUTPATIENT
Start: 2024-02-19 | End: 2024-02-19 | Stop reason: SDUPTHER

## 2024-02-19 RX ORDER — HYDROCODONE BITARTRATE AND ACETAMINOPHEN 5; 325 MG/1; MG/1
1 TABLET ORAL ONCE
Status: COMPLETED | OUTPATIENT
Start: 2024-02-19 | End: 2024-02-19

## 2024-02-19 RX ORDER — DIPHENHYDRAMINE HYDROCHLORIDE 50 MG/ML
12.5 INJECTION INTRAMUSCULAR; INTRAVENOUS
Status: DISCONTINUED | OUTPATIENT
Start: 2024-02-19 | End: 2024-02-19 | Stop reason: HOSPADM

## 2024-02-19 RX ORDER — MEPERIDINE HYDROCHLORIDE 50 MG/ML
INJECTION INTRAMUSCULAR; INTRAVENOUS; SUBCUTANEOUS PRN
Status: DISCONTINUED | OUTPATIENT
Start: 2024-02-19 | End: 2024-02-19 | Stop reason: SDUPTHER

## 2024-02-19 RX ADMIN — ONDANSETRON 4 MG: 2 INJECTION INTRAMUSCULAR; INTRAVENOUS at 16:21

## 2024-02-19 RX ADMIN — LIDOCAINE HYDROCHLORIDE 50 MG: 10 INJECTION, SOLUTION EPIDURAL; INFILTRATION; INTRACAUDAL; PERINEURAL at 16:08

## 2024-02-19 RX ADMIN — PROPOFOL 200 MG: 10 INJECTION, EMULSION INTRAVENOUS at 16:08

## 2024-02-19 RX ADMIN — HYDROMORPHONE HYDROCHLORIDE 0.5 MG: 1 INJECTION, SOLUTION INTRAMUSCULAR; INTRAVENOUS; SUBCUTANEOUS at 16:43

## 2024-02-19 RX ADMIN — FENTANYL CITRATE 50 MCG: 50 INJECTION, SOLUTION INTRAMUSCULAR; INTRAVENOUS at 16:21

## 2024-02-19 RX ADMIN — DEXAMETHASONE SODIUM PHOSPHATE 10 MG: 10 INJECTION INTRAMUSCULAR; INTRAVENOUS at 16:21

## 2024-02-19 RX ADMIN — SODIUM CHLORIDE, POTASSIUM CHLORIDE, SODIUM LACTATE AND CALCIUM CHLORIDE: 600; 310; 30; 20 INJECTION, SOLUTION INTRAVENOUS at 16:33

## 2024-02-19 RX ADMIN — HYDROCODONE BITARTRATE AND ACETAMINOPHEN 1 TABLET: 5; 325 TABLET ORAL at 17:35

## 2024-02-19 RX ADMIN — SODIUM CHLORIDE, POTASSIUM CHLORIDE, SODIUM LACTATE AND CALCIUM CHLORIDE: 600; 310; 30; 20 INJECTION, SOLUTION INTRAVENOUS at 13:05

## 2024-02-19 RX ADMIN — FENTANYL CITRATE 50 MCG: 50 INJECTION, SOLUTION INTRAMUSCULAR; INTRAVENOUS at 16:05

## 2024-02-19 RX ADMIN — MIDAZOLAM HYDROCHLORIDE 2 MG: 1 INJECTION, SOLUTION INTRAMUSCULAR; INTRAVENOUS at 14:18

## 2024-02-19 RX ADMIN — MEPERIDINE HYDROCHLORIDE 12.5 MG: 50 INJECTION, SOLUTION INTRAMUSCULAR; INTRAVENOUS; SUBCUTANEOUS at 16:37

## 2024-02-19 ASSESSMENT — PAIN DESCRIPTION - LOCATION
LOCATION: VAGINA

## 2024-02-19 ASSESSMENT — PAIN DESCRIPTION - DESCRIPTORS
DESCRIPTORS: SORE;BURNING

## 2024-02-19 ASSESSMENT — PAIN - FUNCTIONAL ASSESSMENT: PAIN_FUNCTIONAL_ASSESSMENT: NONE - DENIES PAIN

## 2024-02-19 ASSESSMENT — PAIN SCALES - GENERAL
PAINLEVEL_OUTOF10: 4
PAINLEVEL_OUTOF10: 7
PAINLEVEL_OUTOF10: 2
PAINLEVEL_OUTOF10: 4
PAINLEVEL_OUTOF10: 4

## 2024-02-19 NOTE — ANESTHESIA POSTPROCEDURE EVALUATION
Department of Anesthesiology  Postprocedure Note    Patient: Daiana Mahmood  MRN: [id number]  YOB: 1970  Date of evaluation: 2/19/2024    Procedure Summary       Date: 02/19/24 Room / Location: [address]    Anesthesia Start: 1603 Anesthesia Stop: 1642    Procedures:       EUA, VULVAR COLPOSCOPY      CO2 LASER VAPORIZATION OF VULVA LESION  (FORT CONF# [id number]) Diagnosis:       Vulvar dysplasia      (Vulvar dysplasia [N90.3])    Surgeons: Bong Sahu MD Responsible Provider: Howard Quijano MD    Anesthesia Type: general ASA Status: 3            Anesthesia Type: No value filed.    Nate Phase I: Nate Score: 10    Nate Phase II: Nate Score: 10    Anesthesia Post Evaluation    Patient location during evaluation: PACU  Patient participation: complete - patient participated  Level of consciousness: awake and alert  Pain score: 1  Airway patency: patent  Nausea & Vomiting: no nausea and no vomiting  Cardiovascular status: hemodynamically stable  Respiratory status: acceptable  Hydration status: euvolemic  Pain management: adequate    No notable events documented.

## 2024-02-19 NOTE — ANESTHESIA PRE PROCEDURE
given: Not Answered      Vital Signs (Current):   Vitals:    02/16/24 1329   Weight: 116.1 kg (256 lb)   Height: 1.753 m (5' 9\")                                              BP Readings from Last 3 Encounters:   01/25/24 119/75   12/14/23 138/78   10/05/23 130/84       NPO Status:                                                                                 BMI:   Wt Readings from Last 3 Encounters:   02/16/24 116.1 kg (256 lb)   01/25/24 118.8 kg (261 lb 12.8 oz)   12/14/23 117.5 kg (259 lb)     Body mass index is 37.8 kg/m².    CBC:   Lab Results   Component Value Date/Time    WBC 7.5 08/26/2023 10:23 AM    RBC 4.62 08/26/2023 10:23 AM    RBC 4.44 03/07/2020 10:07 AM    HGB 14.2 08/26/2023 10:23 AM    HCT 40.7 08/26/2023 10:23 AM    MCV 88.1 08/26/2023 10:23 AM    RDW 13.5 08/26/2023 10:23 AM     08/26/2023 10:23 AM       CMP:   Lab Results   Component Value Date/Time     08/30/2023 08:30 AM    K 4.6 08/30/2023 08:30 AM     08/30/2023 08:30 AM    CO2 23 08/30/2023 08:30 AM    BUN 14 08/30/2023 08:30 AM    CREATININE 1.0 08/30/2023 08:30 AM    GFRAA >60 05/04/2022 10:50 AM    AGRATIO 1.1 04/10/2023 06:56 AM    LABGLOM >60 08/30/2023 08:30 AM    GLUCOSE 118 08/30/2023 08:30 AM    GLUCOSE 105 03/07/2020 10:07 AM    PROT 6.6 04/10/2023 06:56 AM    PROT 7.0 03/07/2020 10:07 AM    CALCIUM 9.2 08/30/2023 08:30 AM    BILITOT 0.4 04/10/2023 06:56 AM    ALKPHOS 60 04/10/2023 06:56 AM    AST 19 04/10/2023 06:56 AM    ALT 12 04/10/2023 06:56 AM       POC Tests: No results for input(s): \"POCGLU\", \"POCNA\", \"POCK\", \"POCCL\", \"POCBUN\", \"POCHEMO\", \"POCHCT\" in the last 72 hours.    Coags: No results found for: \"PROTIME\", \"INR\", \"APTT\"    HCG (If Applicable): No results found for: \"PREGTESTUR\", \"PREGSERUM\", \"HCG\", \"HCGQUANT\"     ABGs: No results found for: \"PHART\", \"PO2ART\", \"GYK2OCZ\", \"TII9STG\", \"BEART\", \"Q3TFFZYV\"     Type & Screen (If Applicable):  No results found for: \"LABABO\", \"LABRH\"    Drug/Infectious

## 2024-02-19 NOTE — DISCHARGE INSTRUCTIONS
No alcoholic beverages, no driving or operating machinery, no making important decisions for 24 hours.   You may have a normal diet but should eat lightly day of surgery.  Drink plenty of fluids.  Urinate within 8 hours after surgery, if unable to urinate call your doctor    Please apply silvadene ointment daily as prescribed. Please take the prescribed pain medications for pain control. If you experience any significant, heavy bleeding from the surgical site, please call the office or go to the emergency department. If you experience any foul smelling discharge from the surgical site, please call the office or go to the emergency department.  Please return to the clinic as scheduled for follow up appointment.

## 2024-02-19 NOTE — PROGRESS NOTES
Gyn Oncology Note    Patient seen and evaluated by me today  Chart notes records labs history revd by me  She tells me that she understands the operation and all of the risks, downstream consequences  She tells me that I have answered her questions to her satisfaction today  She tells me that she wants to proceed to OR today as scheduled      CV Adelina KRISHNA

## 2024-02-19 NOTE — PROGRESS NOTES
Discharge instructions discuss with patient and wife, all questions and concerns were answered, verbalizes understanding. All belongings given to patient. Discharge per wheelchair in a stable condition.

## 2024-02-19 NOTE — BRIEF OP NOTE
Brief Operative Note  Department of Obstetrics and Gynecology  [facility]     Patient: Daiana Mahmood   : 1970  MRN: [id number]       Acct: [id number]   Date of Procedure: 24     Pre-operative Diagnosis: 53 y.o. 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 Sahu     Assistant(s): Ld Sharma MD, PGY2    Anesthesia: general    Findings:  4x4 cm vulvar lesion of the right labia. 1x1 cm vulvar lesion of the right labia. 3x2 cm lesion to the perineum. 1x1 cm of left labia.   Total IV fluids/Blood products:  1000 ml crystalloid  Estimated blood loss:  less than 50ml  Drains:  none  Specimens:  None  Instrument and Sponge Count: Correct  Complications:  none  Condition:  good, transferred to post anesthesia recovery    See full operative report for further details.    Ld Sharma MD  Ob/Gyn Resident  2024, 4:01 PM

## 2024-02-20 ENCOUNTER — TELEPHONE (OUTPATIENT)
Dept: GYNECOLOGIC ONCOLOGY | Age: 54
End: 2024-02-20

## 2024-02-20 DIAGNOSIS — G89.18 POST-OP PAIN: ICD-10-CM

## 2024-02-20 DIAGNOSIS — R10.2 VULVAR PAIN: Primary | ICD-10-CM

## 2024-02-20 LAB
EKG ATRIAL RATE: 72 BPM
EKG P AXIS: 72 DEGREES
EKG P-R INTERVAL: 178 MS
EKG Q-T INTERVAL: 402 MS
EKG QRS DURATION: 104 MS
EKG QTC CALCULATION (BAZETT): 440 MS
EKG R AXIS: -7 DEGREES
EKG T AXIS: 42 DEGREES
EKG VENTRICULAR RATE: 72 BPM

## 2024-02-20 RX ORDER — LIDOCAINE HYDROCHLORIDE 40 MG/ML
SOLUTION TOPICAL
Qty: 50 ML | Refills: 0 | Status: SHIPPED | OUTPATIENT
Start: 2024-02-20 | End: 2024-03-20

## 2024-02-20 NOTE — TELEPHONE ENCOUNTER
Called patient to inform her that lidocaine solution covered by [employer] if insurance does not cover.      Writer also inquired on post op status:  Fever: Denies  Pain control:  Describes pain as 4/10, using tylenol and ice packs for comfort at this time.  Informs writer that in past days 4-5 are the worst with pain and is saving Norco's for then.  Reviewed medication instructions/ regimen.  Discussed non-pharm pain interventions including heat packs/ice packs and proper use: No more than 20 minutes at a time and no direct contact to skin to ensure no skin damage.  Incision(s):  Patient states no concerns at this time.  Writer educated patient on showering instructions and importance of showering using antibacterial soap.  Writer educated on proper incision care: showering, keeping incisions clean and dry, cover as needed, and s/s of infection: increased redness, warm to touch, increased drainage, increased swelling, increased pain.  Bleeding: Small amount of bleeding described with wiping and on pads  /BM:  Patient describes urinating without difficulty.  Denies having BM at this time.  Educated patient on taking OTC laxatives, prune or apple juice to ensure constipation does not occur.  Writer educated patient on proper fluid intake and continue use of stool softeners to prevent constipation and bearing down excessively.  Appetite: Patient states eating ok.  Reports doing 18hr fasts with spouse.  Writer educated patient on importance of proper calorie and protein intake to aide in healing process.    Post op instructions reviewed, including when to go to ER: (extreme uncontrolled pain, high fever).  Discussed the importance of deep breathing and walking to prevent post op pneumonia and DVT's.  Post op appointment date/time/location reviewed.  Encouraged patient to call office with any questions or concerns.  Patient voiced understanding and appreciation.

## 2024-02-20 NOTE — OP NOTE
[address] [address]                                OPERATIVE REPORT    PATIENT NAME: FABRICIO NICHOLS                   :        1970  MED REC NO:   [id number]                             ROOM:  ACCOUNT NO:   [id number]                           ADMIT DATE: 2024  PROVIDER:     Bong Sahu MD    DATE OF PROCEDURE:  2024    PREOPERATIVE DIAGNOSIS:  Recurrent high-grade vulvar dysplasia in a  chronically immunosuppressed woman.    POSTOPERATIVE DIAGNOSIS:  Recurrent high-grade vulvar dysplasia in a  chronically immunosuppressed woman, multifocal high-grade vulvar  dysplasia.    COMPLICATIONS:  None.    SURGEON:  Bong Sahu MD    ASSISTANT:  Zachary, PGY-2 Resident.    OPERATIVE FINDINGS:  The patient had multifocal bilateral high-grade  vulvar dysplasia, right greater than left.  All lesions were destroyed.   The operation was extensive carbon dioxide laser vaporization of vulvar  dysplasia.    DISPOSITION:  The patient to recovery room stable.    SPECIMENS:  None.    DESCRIPTION OF PROCEDURE:  After informed consent was obtained, the  patient was brought to the operating suite.  She was given general  endotracheal anesthesia.  She was carefully positioned, prepped and  draped in the usual manner.  The vulvar area was soaked with a dilute  vinegar solution and a thorough examination of the vulva and distal  vagina was undertaken.  The patient had multifocal disease right and  left, right greater than left.  She had a lesion in the midline.  She  had a large 2 to 3 cm lesion on the right labia majora medially and  minora laterally and she had some small multifocal lesions on the left  anterior vulva as well.  All lesions were destroyed.  We set the carbon  dioxide laser to 15 watt and using appropriate spot size and power  density, we worked methodically and meticulously to destroy all of

## 2024-02-20 NOTE — TELEPHONE ENCOUNTER
Patient called and voiced that insurance does not cover lidocaine as prescribed and out of pocket cost is $1,000.  Patient requested Lidocaine HCL 4% topical solution.  Spoke with PA and PA agreed that that was fine.  Patient informed and instructed to mix with silvadene cream and apply.  RX sent to patient's pharmacy of choice.  Patient voiced understanding and appreciation.

## 2024-02-26 DIAGNOSIS — B37.9 CANDIDA INFECTION: ICD-10-CM

## 2024-02-26 RX ORDER — NYSTATIN 100000 [USP'U]/G
POWDER TOPICAL
Qty: 60 G | Refills: 1 | Status: SHIPPED | OUTPATIENT
Start: 2024-02-26

## 2024-02-26 NOTE — TELEPHONE ENCOUNTER
Called and spoke to patient. She states that she does need this prescription refilled if possible.

## 2024-02-28 ENCOUNTER — OFFICE VISIT (OUTPATIENT)
Dept: GYNECOLOGIC ONCOLOGY | Age: 54
End: 2024-02-28

## 2024-02-28 VITALS
HEIGHT: 69 IN | DIASTOLIC BLOOD PRESSURE: 94 MMHG | OXYGEN SATURATION: 96 % | HEART RATE: 85 BPM | WEIGHT: 262 LBS | BODY MASS INDEX: 38.8 KG/M2 | SYSTOLIC BLOOD PRESSURE: 132 MMHG

## 2024-02-28 DIAGNOSIS — N90.3 VULVAR DYSPLASIA: ICD-10-CM

## 2024-02-28 DIAGNOSIS — G89.18 POST-OP PAIN: Primary | ICD-10-CM

## 2024-02-28 PROCEDURE — 99024 POSTOP FOLLOW-UP VISIT: CPT | Performed by: PHYSICIAN ASSISTANT

## 2024-02-28 RX ORDER — SULFAMETHOXAZOLE AND TRIMETHOPRIM 800; 160 MG/1; MG/1
1 TABLET ORAL 2 TIMES DAILY
Qty: 10 TABLET | Refills: 0 | Status: SHIPPED | OUTPATIENT
Start: 2024-02-28 | End: 2024-03-04

## 2024-02-28 RX ORDER — HYDROCODONE BITARTRATE AND ACETAMINOPHEN 5; 325 MG/1; MG/1
1 TABLET ORAL EVERY 6 HOURS PRN
Qty: 10 TABLET | Refills: 0 | Status: SHIPPED | OUTPATIENT
Start: 2024-02-28 | End: 2024-03-02

## 2024-02-28 ASSESSMENT — ENCOUNTER SYMPTOMS
NAUSEA: 1
EYES NEGATIVE: 1
RESPIRATORY NEGATIVE: 1

## 2024-02-28 NOTE — PROGRESS NOTES
[facility] Gynecologic Oncology  [address]  [facility] [address]    Daiana Mahmood is a 53 y.o. female who presents for a Post Operative visit today     CC/HPI: She is a  female who initially saw her local OB/GYN provider in 2021 for her annual exam she had concerns of a labial soreness that was present for approximately 2 months.      She has a surgical history pertinent for hysterectomy secondary to \"heavy menses\".  Recalls abnormal Pap smears in her 20s and she believes she had to have \"laser therapy\".    Her Gynecologic provider Ashley Berman NP obtained a vaginal Pap smear on 21 which later revealed low-grade CHARIS, HPV negative. Vulvar biopsy later returned positive for DARRELL 3 and she was referred to [facility] Gynecologic Oncology for further evaluation and treatment.     Her medical history is significant for polycystic kidney disease and she has a renal transplant receipt currently on long term use of immunosuppressant medication Prograf. She is a former smoker, cessation over 30 years ago    She was seen by [facility] gynecology oncology and ultimately underwent a right natali vulvectomy with a partial vulvectomy using ultrasonic scalpel on 10/6/2021.  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  The margins were treated with ultrasonic scalpel at the time of the excision.    She has had follow up surveillance pelvic examinations and colposcopy with serial visits and treatment for her vulvar dysplasia.     2022 underwent exam under anesthesia, colposcopy with partial vulvectomy using ultrasonic scalpel vulvar lesions. 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 Vaginal colposcopy with negative colposcopic

## 2024-02-28 NOTE — PROGRESS NOTES
Review of Systems   Constitutional: Negative.    HENT:  Negative.     Eyes: Negative.    Respiratory: Negative.     Cardiovascular: Negative.    Gastrointestinal:  Positive for nausea.   Endocrine: Positive for hot flashes (night sweats).   Genitourinary: Negative.     Musculoskeletal: Negative.    Skin: Negative.    Neurological:  Positive for headaches.   Hematological:  Bruises/bleeds easily.       patient/family

## 2024-03-04 ENCOUNTER — OFFICE VISIT (OUTPATIENT)
Dept: GASTROENTEROLOGY | Age: 54
End: 2024-03-04
Payer: COMMERCIAL

## 2024-03-04 VITALS
SYSTOLIC BLOOD PRESSURE: 131 MMHG | WEIGHT: 260 LBS | BODY MASS INDEX: 38.4 KG/M2 | DIASTOLIC BLOOD PRESSURE: 71 MMHG | TEMPERATURE: 97.8 F

## 2024-03-04 DIAGNOSIS — K21.9 GASTROESOPHAGEAL REFLUX DISEASE, UNSPECIFIED WHETHER ESOPHAGITIS PRESENT: ICD-10-CM

## 2024-03-04 DIAGNOSIS — K31.84 GASTROPARESIS: ICD-10-CM

## 2024-03-04 DIAGNOSIS — Z94.0 RENAL TRANSPLANT, STATUS POST: ICD-10-CM

## 2024-03-04 DIAGNOSIS — R10.13 EPIGASTRIC BURNING SENSATION: ICD-10-CM

## 2024-03-04 DIAGNOSIS — D36.9 TUBULAR ADENOMA: Primary | ICD-10-CM

## 2024-03-04 PROCEDURE — 3078F DIAST BP <80 MM HG: CPT | Performed by: INTERNAL MEDICINE

## 2024-03-04 PROCEDURE — 99214 OFFICE O/P EST MOD 30 MIN: CPT | Performed by: INTERNAL MEDICINE

## 2024-03-04 PROCEDURE — 3075F SYST BP GE 130 - 139MM HG: CPT | Performed by: INTERNAL MEDICINE

## 2024-03-04 RX ORDER — FAMOTIDINE 20 MG/1
20 TABLET, FILM COATED ORAL 2 TIMES DAILY
Qty: 180 TABLET | Refills: 1 | Status: SHIPPED | OUTPATIENT
Start: 2024-03-04

## 2024-03-04 ASSESSMENT — ENCOUNTER SYMPTOMS
ABDOMINAL DISTENTION: 0
DIARRHEA: 0
BLOOD IN STOOL: 0
VOMITING: 0
NAUSEA: 1
CHOKING: 0
WHEEZING: 0
ANAL BLEEDING: 0
ABDOMINAL PAIN: 1
SORE THROAT: 0
CONSTIPATION: 1
TROUBLE SWALLOWING: 0
VOICE CHANGE: 0
COUGH: 0
SHORTNESS OF BREATH: 0
RECTAL PAIN: 0

## 2024-03-04 NOTE — PROGRESS NOTES
Psychiatric:         Behavior: Behavior normal.           LABORATORY DATA: Reviewed  Lab Results   Component Value Date    WBC 5.9 02/19/2024    HGB 13.3 02/19/2024    HCT 39.1 02/19/2024    MCV 87.5 02/19/2024     02/19/2024     02/19/2024    K 4.5 02/19/2024     02/19/2024    CO2 23 02/19/2024    BUN 17 02/19/2024    CREATININE 0.9 02/19/2024    LABALBU 3.9 02/19/2024    BILITOT 0.5 02/19/2024    ALKPHOS 74 02/19/2024    AST 17 02/19/2024    ALT 20 02/19/2024         Lab Results   Component Value Date    RBC 4.47 02/19/2024    HGB 13.3 02/19/2024    MCV 87.5 02/19/2024    MCH 29.8 02/19/2024    MCHC 34.0 02/19/2024    RDW 13.2 02/19/2024    MPV 10.3 02/19/2024    BASOPCT 1 02/19/2024    LYMPHSABS 2.05 02/19/2024    MONOSABS 0.32 02/19/2024    NEUTROABS 3.18 02/19/2024    EOSABS 0.26 02/19/2024    BASOSABS 0.05 02/19/2024         DIAGNOSTIC TESTING:     XR CHEST (2 VW)    Result Date: 2/19/2024  EXAMINATION: TWO XRAY VIEWS OF THE CHEST 2/19/2024 1:28 pm COMPARISON: 09/27/2021. HISTORY: ORDERING SYSTEM PROVIDED HISTORY: pre op TECHNOLOGIST PROVIDED HISTORY: pre op FINDINGS: The cardiomediastinal silhouette is within normal limits. There is no consolidation, pneumothorax or evidence for edema. No evidence for effusion. No acute osseous abnormality is identified.     No acute airspace disease identified.          Assessment  1. Tubular adenoma    2. Gastroparesis    3. Epigastric burning sensation    4. Gastroesophageal reflux disease, unspecified whether esophagitis present    5. Renal transplant, status post        Plan    Will switch PPIs to famotidine    Small frequent meals    Call for any issues    Pt seems to have signs and symptoms consistent with GERD, acid indigestion and heartburns. She was discussed  in detail about some possible life style and dietary modifications. She was stressed about the maintenance  of appropriate weight and effect of obesity contributing to reflux symptoms.

## 2024-03-06 ENCOUNTER — OFFICE VISIT (OUTPATIENT)
Dept: GYNECOLOGIC ONCOLOGY | Age: 54
End: 2024-03-06

## 2024-03-06 ENCOUNTER — HOSPITAL ENCOUNTER (OUTPATIENT)
Age: 54
Setting detail: SPECIMEN
Discharge: HOME | End: 2024-03-06

## 2024-03-06 VITALS
OXYGEN SATURATION: 97 % | DIASTOLIC BLOOD PRESSURE: 95 MMHG | HEIGHT: 69 IN | SYSTOLIC BLOOD PRESSURE: 137 MMHG | BODY MASS INDEX: 38.69 KG/M2 | HEART RATE: 89 BPM | WEIGHT: 261.2 LBS

## 2024-03-06 DIAGNOSIS — N89.8 VAGINAL DISCHARGE: ICD-10-CM

## 2024-03-06 DIAGNOSIS — N90.3 VULVAR DYSPLASIA: Primary | ICD-10-CM

## 2024-03-06 PROCEDURE — 99024 POSTOP FOLLOW-UP VISIT: CPT | Performed by: PHYSICIAN ASSISTANT

## 2024-03-06 ASSESSMENT — ENCOUNTER SYMPTOMS
RESPIRATORY NEGATIVE: 1
EYES NEGATIVE: 1
NAUSEA: 1

## 2024-03-06 NOTE — PROGRESS NOTES
I redid the medication.  Fluconazole 150 mg daily x 7 days. One refill    Review of Systems   Constitutional: Negative.    HENT:  Negative.     Eyes: Negative.    Respiratory: Negative.     Cardiovascular: Negative.    Gastrointestinal:  Positive for nausea (not new).   Endocrine: Positive for hot flashes (night sweats).   Genitourinary: Negative.     Musculoskeletal: Negative.    Skin: Negative.    Neurological:  Positive for headaches (not new).   Hematological:  Bruises/bleeds easily.

## 2024-03-06 NOTE — PROGRESS NOTES
[facility] Gynecologic Oncology  [address], [address], [address]  [place] [address]    Daiana Mahmood is a 53 y.o. female who presents for a Post Operative visit today     CC/HPI: She is a  female who initially saw her local OB/GYN provider in 2021 for her annual exam she had concerns of a labial soreness that was present for approximately 2 months.      She has a surgical history pertinent for hysterectomy secondary to \"heavy menses\".  Recalls abnormal Pap smears in her 20s and she believes she had to have \"laser therapy\".    Her Gynecologic provider Ashley Berman NP obtained a vaginal Pap smear on 21 which later revealed low-grade CHARIS, HPV negative. Vulvar biopsy later returned positive for DARRELL 3 and she was referred to [facility] Gynecologic Oncology for further evaluation and treatment.     Her medical history is significant for polycystic kidney disease and she has a renal transplant receipt currently on long term use of immunosuppressant medication Prograf. She is a former smoker, cessation over 30 years ago    She was seen by [facility] gynecology oncology and ultimately underwent a right natali vulvectomy with a partial vulvectomy using ultrasonic scalpel on 10/6/2021.  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  The margins were treated with ultrasonic scalpel at the time of the excision.    She has had follow up surveillance pelvic examinations and colposcopy with serial visits and treatment for her vulvar dysplasia.     2022 underwent exam under anesthesia, colposcopy with partial vulvectomy using ultrasonic scalpel vulvar lesions. 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 Vaginal colposcopy with negative colposcopic

## 2024-03-07 LAB
CANDIDA SPECIES: NEGATIVE
GARDNERELLA VAGINALIS: NEGATIVE
SOURCE: NORMAL
TRICHOMONAS: NEGATIVE

## 2024-03-19 ENCOUNTER — OFFICE VISIT (OUTPATIENT)
Dept: GYNECOLOGIC ONCOLOGY | Age: 54
End: 2024-03-19

## 2024-03-19 VITALS
OXYGEN SATURATION: 97 % | DIASTOLIC BLOOD PRESSURE: 84 MMHG | WEIGHT: 262.2 LBS | TEMPERATURE: 97.2 F | SYSTOLIC BLOOD PRESSURE: 134 MMHG | HEART RATE: 97 BPM | BODY MASS INDEX: 38.72 KG/M2

## 2024-03-19 DIAGNOSIS — N90.3 VULVAR DYSPLASIA: Primary | ICD-10-CM

## 2024-03-19 PROCEDURE — 99024 POSTOP FOLLOW-UP VISIT: CPT | Performed by: PHYSICIAN ASSISTANT

## 2024-03-19 ASSESSMENT — ENCOUNTER SYMPTOMS
VOICE CHANGE: 0
COUGH: 0
NAUSEA: 1
ABDOMINAL PAIN: 0
BLOOD IN STOOL: 0
SHORTNESS OF BREATH: 0
BACK PAIN: 0
WHEEZING: 0
CHEST TIGHTNESS: 0
TROUBLE SWALLOWING: 0
CONSTIPATION: 0
SORE THROAT: 0
ABDOMINAL DISTENTION: 0
VOMITING: 0
DIARRHEA: 0
HEMOPTYSIS: 0
EYE PROBLEMS: 0
SCLERAL ICTERUS: 0
RECTAL PAIN: 0

## 2024-03-19 NOTE — PROGRESS NOTES
Review of Systems   Constitutional:  Negative for appetite change, chills, diaphoresis, fatigue, fever and unexpected weight change.   HENT:   Negative for hearing loss, lump/mass, mouth sores, nosebleeds, sore throat, tinnitus, trouble swallowing and voice change.    Eyes:  Negative for eye problems and icterus.   Respiratory:  Negative for chest tightness, cough, hemoptysis, shortness of breath and wheezing.    Cardiovascular:  Negative for chest pain, leg swelling and palpitations.   Gastrointestinal:  Positive for nausea. Negative for abdominal distention, abdominal pain, blood in stool, constipation, diarrhea, rectal pain and vomiting.   Endocrine: Positive for hot flashes (night time).   Genitourinary:  Negative for bladder incontinence, difficulty urinating, dyspareunia, dysuria, frequency, hematuria, menstrual problem, nocturia, pelvic pain, vaginal bleeding and vaginal discharge.    Musculoskeletal:  Negative for arthralgias, back pain, flank pain, gait problem, myalgias, neck pain and neck stiffness.   Skin:  Negative for itching, rash and wound.   Neurological:  Positive for headaches. Negative for dizziness, extremity weakness, gait problem, light-headedness, numbness, seizures and speech difficulty.   Hematological:  Negative for adenopathy. Bruises/bleeds easily (Bruise easily).   Psychiatric/Behavioral:  Positive for depression. Negative for confusion, decreased concentration, sleep disturbance and suicidal ideas. The patient is nervous/anxious.

## 2024-03-19 NOTE — PROGRESS NOTES
[facility] Gynecologic Oncology  [address], [address], [address]  [facility] [address]    Daiana Mahmood is a 53 y.o. female who presents for a Post Operative visit today     CC/HPI: She is a  female who initially saw her local OB/GYN provider in 2021 for her annual exam she had concerns of a labial soreness that was present for approximately 2 months.      She has a surgical history pertinent for hysterectomy secondary to \"heavy menses\".  Recalls abnormal Pap smears in her 20s and she believes she had to have \"laser therapy\".    Her Gynecologic provider Ashley Berman NP obtained a vaginal Pap smear on 21 which later revealed low-grade CHARIS, HPV negative. Vulvar biopsy later returned positive for DARRELL 3 and she was referred to [facility] Gynecologic Oncology for further evaluation and treatment.     Her medical history is significant for polycystic kidney disease and she has a renal transplant receipt currently on long term use of immunosuppressant medication Prograf. She is a former smoker, cessation over 30 years ago    She was seen by [facility] gynecology oncology and ultimately underwent a right natali vulvectomy with a partial vulvectomy using ultrasonic scalpel on 10/6/2021.  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  The margins were treated with ultrasonic scalpel at the time of the excision.    She has had follow up surveillance pelvic examinations and colposcopy with serial visits and treatment for her vulvar dysplasia.     2022 underwent exam under anesthesia, colposcopy with partial vulvectomy using ultrasonic scalpel vulvar lesions. 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 Vaginal colposcopy with negative colposcopic

## 2024-04-16 ENCOUNTER — OFFICE VISIT (OUTPATIENT)
Dept: GYNECOLOGIC ONCOLOGY | Age: 54
End: 2024-04-16
Payer: COMMERCIAL

## 2024-04-16 VITALS
SYSTOLIC BLOOD PRESSURE: 122 MMHG | OXYGEN SATURATION: 98 % | HEIGHT: 69 IN | BODY MASS INDEX: 39.46 KG/M2 | DIASTOLIC BLOOD PRESSURE: 72 MMHG | HEART RATE: 96 BPM | WEIGHT: 266.4 LBS

## 2024-04-16 DIAGNOSIS — D07.1 VULVAR INTRAEPITHELIAL NEOPLASIA (VIN) GRADE 3: Primary | ICD-10-CM

## 2024-04-16 PROCEDURE — 3078F DIAST BP <80 MM HG: CPT | Performed by: NURSE PRACTITIONER

## 2024-04-16 PROCEDURE — 99213 OFFICE O/P EST LOW 20 MIN: CPT | Performed by: NURSE PRACTITIONER

## 2024-04-16 PROCEDURE — 3074F SYST BP LT 130 MM HG: CPT | Performed by: NURSE PRACTITIONER

## 2024-04-16 ASSESSMENT — ENCOUNTER SYMPTOMS
RESPIRATORY NEGATIVE: 1
EYES NEGATIVE: 1
GASTROINTESTINAL NEGATIVE: 1

## 2024-04-16 NOTE — PROGRESS NOTES
Review of Systems   Constitutional: Negative.    HENT:  Negative.     Eyes: Negative.    Respiratory: Negative.     Cardiovascular: Negative.    Gastrointestinal: Negative.    Endocrine: Positive for hot flashes.   Genitourinary: Negative.     Musculoskeletal: Negative.    Skin: Negative.    Neurological: Negative.    Hematological:  Bruises/bleeds easily.      
guidelines:    1.RTO in September for vulvar colposcopy, and f/u for history of high grade vulvar dysplasia.  2.Order for mammogram and DEXA scan at end of December, 2024  3.Follow Up Instructions:  May resume sexual intercourse, may lift post surgical restrictions.  4. I spent 30 minutes with patient face to face    Electronically signed by GOMEZ Vu CNP on 4/16/24 at 2:44 PM EDT

## 2024-07-08 DIAGNOSIS — B37.9 CANDIDA INFECTION: ICD-10-CM

## 2024-07-08 RX ORDER — NYSTATIN 100000 [USP'U]/G
POWDER TOPICAL
Qty: 60 G | Refills: 1 | Status: SHIPPED | OUTPATIENT
Start: 2024-07-08

## 2024-07-21 ENCOUNTER — HOSPITAL ENCOUNTER (EMERGENCY)
Age: 54
Discharge: HOME | End: 2024-07-21
Attending: EMERGENCY MEDICINE
Payer: COMMERCIAL

## 2024-07-21 ENCOUNTER — APPOINTMENT (OUTPATIENT)
Dept: GENERAL RADIOLOGY | Age: 54
End: 2024-07-21
Payer: COMMERCIAL

## 2024-07-21 VITALS
HEIGHT: 69 IN | RESPIRATION RATE: 18 BRPM | TEMPERATURE: 97.9 F | SYSTOLIC BLOOD PRESSURE: 123 MMHG | OXYGEN SATURATION: 97 % | HEART RATE: 110 BPM | DIASTOLIC BLOOD PRESSURE: 81 MMHG | WEIGHT: 265 LBS | BODY MASS INDEX: 39.25 KG/M2

## 2024-07-21 DIAGNOSIS — M79.604 RIGHT LEG PAIN: Primary | ICD-10-CM

## 2024-07-21 DIAGNOSIS — R79.89 ELEVATED D-DIMER: ICD-10-CM

## 2024-07-21 LAB
ANION GAP SERPL CALCULATED.3IONS-SCNC: 12 MMOL/L (ref 9–17)
BASOPHILS # BLD: 0 K/UL (ref 0–0.2)
BASOPHILS NFR BLD: 1 % (ref 0–2)
BUN SERPL-MCNC: 15 MG/DL (ref 6–20)
CALCIUM SERPL-MCNC: 9.2 MG/DL (ref 8.6–10.4)
CHLORIDE SERPL-SCNC: 105 MMOL/L (ref 98–107)
CO2 SERPL-SCNC: 21 MMOL/L (ref 20–31)
CREAT SERPL-MCNC: 1 MG/DL (ref 0.5–0.9)
D DIMER PPP FEU-MCNC: 0.82 UG/ML FEU (ref 0–0.59)
EOSINOPHIL # BLD: 0.2 K/UL (ref 0–0.4)
EOSINOPHILS RELATIVE PERCENT: 2 % (ref 0–4)
ERYTHROCYTE [DISTWIDTH] IN BLOOD BY AUTOMATED COUNT: 13 % (ref 11.5–14.9)
GFR, ESTIMATED: 67 ML/MIN/1.73M2
GLUCOSE SERPL-MCNC: 146 MG/DL (ref 70–99)
HCG SERPL QL: NEGATIVE
HCT VFR BLD AUTO: 40.4 % (ref 36–46)
HGB BLD-MCNC: 13.5 G/DL (ref 12–16)
LYMPHOCYTES NFR BLD: 1.8 K/UL (ref 1–4.8)
LYMPHOCYTES RELATIVE PERCENT: 27 % (ref 24–44)
MCH RBC QN AUTO: 30 PG (ref 26–34)
MCHC RBC AUTO-ENTMCNC: 33.4 G/DL (ref 31–37)
MCV RBC AUTO: 89.6 FL (ref 80–100)
MONOCYTES NFR BLD: 0.3 K/UL (ref 0.1–1.3)
MONOCYTES NFR BLD: 5 % (ref 1–7)
NEUTROPHILS NFR BLD: 65 % (ref 36–66)
NEUTS SEG NFR BLD: 4.5 K/UL (ref 1.3–9.1)
PLATELET # BLD AUTO: 183 K/UL (ref 150–450)
PMV BLD AUTO: 8.4 FL (ref 6–12)
POTASSIUM SERPL-SCNC: 3.8 MMOL/L (ref 3.7–5.3)
RBC # BLD AUTO: 4.5 M/UL (ref 4–5.2)
SODIUM SERPL-SCNC: 138 MMOL/L (ref 135–144)
TROPONIN I SERPL HS-MCNC: <6 NG/L (ref 0–14)
WBC OTHER # BLD: 6.8 K/UL (ref 3.5–11)

## 2024-07-21 PROCEDURE — 85379 FIBRIN DEGRADATION QUANT: CPT

## 2024-07-21 PROCEDURE — 84484 ASSAY OF TROPONIN QUANT: CPT

## 2024-07-21 PROCEDURE — 99285 EMERGENCY DEPT VISIT HI MDM: CPT

## 2024-07-21 PROCEDURE — 71045 X-RAY EXAM CHEST 1 VIEW: CPT

## 2024-07-21 PROCEDURE — 6360000002 HC RX W HCPCS

## 2024-07-21 PROCEDURE — 96372 THER/PROPH/DIAG INJ SC/IM: CPT

## 2024-07-21 PROCEDURE — 85025 COMPLETE CBC W/AUTO DIFF WBC: CPT

## 2024-07-21 PROCEDURE — 36415 COLL VENOUS BLD VENIPUNCTURE: CPT

## 2024-07-21 PROCEDURE — 93005 ELECTROCARDIOGRAM TRACING: CPT

## 2024-07-21 PROCEDURE — 80048 BASIC METABOLIC PNL TOTAL CA: CPT

## 2024-07-21 PROCEDURE — 84703 CHORIONIC GONADOTROPIN ASSAY: CPT

## 2024-07-21 RX ORDER — ENOXAPARIN SODIUM 150 MG/ML
1 INJECTION SUBCUTANEOUS ONCE
Status: COMPLETED | OUTPATIENT
Start: 2024-07-21 | End: 2024-07-21

## 2024-07-21 RX ADMIN — ENOXAPARIN SODIUM 120 MG: 150 INJECTION SUBCUTANEOUS at 18:24

## 2024-07-21 NOTE — ED PROVIDER NOTES
[facility] ED  eMERGENCY dEPARTMENT eNCOUnter   Attending Attestation     Pt Name: Daiana Mahmood  MRN: [id number]  Birthdate 1970  Date of evaluation: 7/21/24    History, EXAM, MDM:    Daiana Mahmood is a 53 y.o. female who presents with Leg Pain (right)  R. Leg pain, d.dimer elevated. Lovenox ordered.  Outpatient doppler ordered.     Vitals:   Vitals:    07/21/24 1745 07/21/24 1800 07/21/24 1815 07/21/24 1826   BP: 113/62 124/76 127/77 123/81   Pulse:       Resp:       Temp:       TempSrc:       SpO2: 97% 96% 97% 97%   Weight:       Height:         I performed a history and physical examination of the patient and discussed management with the resident. I reviewed the resident’s note and agree with the documented findings and plan of care. Any areas of disagreement are noted on the chart. I was personally present for the key portions of any procedures. I have documented in the chart those procedures where I was not present during the key portions. I have personally reviewed all images and agree with the resident's interpretation. I have reviewed the emergency nurses triage note. I agree with the chief complaint, past medical history, past surgical history, allergies, medications, social and family history as documented unless otherwise noted below. Documentation of the HPI, Physical Exam and Medical Decision Making performed by medical students or scribes is based on my personal performance of the HPI, PE and MDM. For Phys Assistant/ Nurse Practitioner cases/documentation I have had a face to face evaluation of this patient and have completed at least one if not all key elements of the E/M (history, physical exam, and MDM). Additional findings are as noted.    Sav Villalobos MD  Attending Emergency  Physician               Sav Villalobos MD  07/21/24 5603    
   Other Maternal Grandfather         brain aneurysm    Breast Cancer Maternal Cousin     Kidney Disease Other         multiple aunts and uncles with polycystic kidney       Allergies:  Chloraprep one step [chlorhexidine gluconate], Morphine, Seasonal, Silvadene [silver sulfadiazine], Cat hair extract, and Dust mite extract    Home Medications:  Prior to Admission medications    Medication Sig Start Date End Date Taking? Authorizing Provider   nystatin (MYCOSTATIN) 332378 UNIT/GM powder APPLY 3 TIMES DAILY AS NEEDED TO AFFECTED AREA 7/8/24   Delia Desai PA-C   famotidine (PEPCID) 20 MG tablet Take 1 tablet by mouth 2 times daily 3/4/24   Naren Neil MD   tacrolimus (PROGRAF) 1 MG capsule 4 tabs bid plus 0.5 mg bid for total of 4.5 mg bid 9/20/19   Brittnee Gauthier MD   tacrolimus (PROGRAF) 0.5 MG capsule Take 1 capsule by mouth 2 times daily For total of 4.5 mg bid 9/20/19   Brittnee Gauthier MD   mycophenolate (MYFORTIC) 180 MG DR tablet Take 2 tablets by mouth 2 times daily 2 tablets twice daily    ProviderAlbaro MD         REVIEW OF SYSTEMS       Review of Systems  See HPI  PHYSICAL EXAM      INITIAL VITALS:   BP (!) 144/91   Pulse (!) 110   Temp 97.9 °F (36.6 °C) (Oral)   Resp 18   Ht 1.753 m (5' 9\")   Wt 120.2 kg (265 lb)   SpO2 96%   BMI 39.13 kg/m²     Physical Exam  Constitutional:       General: She is not in acute distress.     Appearance: She is obese.   HENT:      Head: Normocephalic and atraumatic.      Mouth/Throat:      Mouth: Mucous membranes are moist.      Pharynx: Oropharynx is clear.   Eyes:      Extraocular Movements: Extraocular movements intact.   Cardiovascular:      Rate and Rhythm: Regular rhythm. Tachycardia present.      Pulses: Normal pulses.   Pulmonary:      Effort: Pulmonary effort is normal. No respiratory distress.      Breath sounds: Normal breath sounds.   Abdominal:      Palpations: Abdomen is soft.      Tenderness: There is no abdominal

## 2024-07-21 NOTE — DISCHARGE INSTRUCTIONS
Please return to the ED with any new or worsening symptoms or complaints including chest pain, lightheadedness, dizziness, shortness of breath, feeling like in a pass out, or any other concerns.    Please call [phone] to schedule appointment first thing tomorrow morning.

## 2024-07-21 NOTE — ED NOTES
Mode of arrival (squad #, walk in, police, etc) : walk in        Chief complaint(s): leg pain        Arrival Note (brief scenario, treatment PTA, etc).: Pt arrives to the ED with the complaint of leg pain. Pt states this has been going on for a few days and started in her toe, and has worked its way up to her knee and upper thigh. Pt sttaes Dr. Rubi told her it could be a blood clot. Pt denies redness or warmth just states it is uncomfortable and keeping her up at night.         C= \"Have you ever felt that you should Cut down on your drinking?\"  No  A= \"Have people Annoyed you by criticizing your drinking?\"  No  G= \"Have you ever felt bad or Guilty about your drinking?\"  No  E= \"Have you ever had a drink as an Eye-opener first thing in the morning to steady your nerves or to help a hangover?\"  No      Deferred []      Reason for deferring: N/A    *If yes to two or more: probable alcohol abuse.*

## 2024-07-22 ENCOUNTER — HOSPITAL ENCOUNTER (OUTPATIENT)
Dept: VASCULAR LAB | Age: 54
Discharge: HOME | End: 2024-07-24
Payer: COMMERCIAL

## 2024-07-22 ENCOUNTER — PATIENT MESSAGE (OUTPATIENT)
Dept: PRIMARY CARE CLINIC | Age: 54
End: 2024-07-22

## 2024-07-22 DIAGNOSIS — M79.604 RIGHT LEG PAIN: ICD-10-CM

## 2024-07-22 DIAGNOSIS — R79.89 ELEVATED D-DIMER: ICD-10-CM

## 2024-07-22 DIAGNOSIS — M79.606 PAIN OF LOWER EXTREMITY, UNSPECIFIED LATERALITY: Primary | ICD-10-CM

## 2024-07-22 LAB
EKG ATRIAL RATE: 105 BPM
EKG P AXIS: 56 DEGREES
EKG P-R INTERVAL: 172 MS
EKG Q-T INTERVAL: 376 MS
EKG QRS DURATION: 92 MS
EKG QTC CALCULATION (BAZETT): 496 MS
EKG R AXIS: -46 DEGREES
EKG T AXIS: 68 DEGREES
EKG VENTRICULAR RATE: 105 BPM

## 2024-07-22 PROCEDURE — 93010 ELECTROCARDIOGRAM REPORT: CPT | Performed by: INTERNAL MEDICINE

## 2024-07-22 PROCEDURE — 93971 EXTREMITY STUDY: CPT | Performed by: SURGERY

## 2024-07-22 PROCEDURE — 93971 EXTREMITY STUDY: CPT

## 2024-07-23 RX ORDER — TRAMADOL HYDROCHLORIDE 50 MG/1
50 TABLET ORAL EVERY 6 HOURS PRN
Qty: 20 TABLET | Refills: 0 | Status: SHIPPED | OUTPATIENT
Start: 2024-07-23 | End: 2024-07-28

## 2024-07-23 ASSESSMENT — PATIENT HEALTH QUESTIONNAIRE - PHQ9
6. FEELING BAD ABOUT YOURSELF - OR THAT YOU ARE A FAILURE OR HAVE LET YOURSELF OR YOUR FAMILY DOWN: NOT AT ALL
8. MOVING OR SPEAKING SO SLOWLY THAT OTHER PEOPLE COULD HAVE NOTICED. OR THE OPPOSITE - BEING SO FIDGETY OR RESTLESS THAT YOU HAVE BEEN MOVING AROUND A LOT MORE THAN USUAL: SEVERAL DAYS
5. POOR APPETITE OR OVEREATING: SEVERAL DAYS
3. TROUBLE FALLING OR STAYING ASLEEP: SEVERAL DAYS
SUM OF ALL RESPONSES TO PHQ QUESTIONS 1-9: 8
4. FEELING TIRED OR HAVING LITTLE ENERGY: NEARLY EVERY DAY
7. TROUBLE CONCENTRATING ON THINGS, SUCH AS READING THE NEWSPAPER OR WATCHING TELEVISION: NOT AT ALL
1. LITTLE INTEREST OR PLEASURE IN DOING THINGS: SEVERAL DAYS
2. FEELING DOWN, DEPRESSED OR HOPELESS: SEVERAL DAYS
10. IF YOU CHECKED OFF ANY PROBLEMS, HOW DIFFICULT HAVE THESE PROBLEMS MADE IT FOR YOU TO DO YOUR WORK, TAKE CARE OF THINGS AT HOME, OR GET ALONG WITH OTHER PEOPLE: SOMEWHAT DIFFICULT
9. THOUGHTS THAT YOU WOULD BE BETTER OFF DEAD, OR OF HURTING YOURSELF: NOT AT ALL

## 2024-07-24 ENCOUNTER — HOSPITAL ENCOUNTER (OUTPATIENT)
Age: 54
Discharge: HOME | End: 2024-07-24
Payer: COMMERCIAL

## 2024-07-24 ENCOUNTER — OFFICE VISIT (OUTPATIENT)
Dept: PRIMARY CARE CLINIC | Age: 54
End: 2024-07-24
Payer: COMMERCIAL

## 2024-07-24 ENCOUNTER — HOSPITAL ENCOUNTER (OUTPATIENT)
Dept: CT IMAGING | Age: 54
Discharge: HOME | End: 2024-07-26
Payer: COMMERCIAL

## 2024-07-24 VITALS
HEIGHT: 69 IN | WEIGHT: 271.2 LBS | OXYGEN SATURATION: 97 % | BODY MASS INDEX: 40.17 KG/M2 | HEART RATE: 85 BPM | DIASTOLIC BLOOD PRESSURE: 86 MMHG | SYSTOLIC BLOOD PRESSURE: 128 MMHG

## 2024-07-24 DIAGNOSIS — R79.89 ELEVATED D-DIMER: ICD-10-CM

## 2024-07-24 DIAGNOSIS — R00.0 TACHYCARDIA: ICD-10-CM

## 2024-07-24 DIAGNOSIS — M79.604 PAIN OF RIGHT LOWER EXTREMITY: ICD-10-CM

## 2024-07-24 DIAGNOSIS — R00.0 TACHYCARDIA: Primary | ICD-10-CM

## 2024-07-24 LAB — TSH SERPL DL<=0.05 MIU/L-ACNC: 1.94 UIU/ML (ref 0.3–5)

## 2024-07-24 PROCEDURE — 36415 COLL VENOUS BLD VENIPUNCTURE: CPT

## 2024-07-24 PROCEDURE — 3074F SYST BP LT 130 MM HG: CPT | Performed by: FAMILY MEDICINE

## 2024-07-24 PROCEDURE — 3079F DIAST BP 80-89 MM HG: CPT | Performed by: FAMILY MEDICINE

## 2024-07-24 PROCEDURE — 84443 ASSAY THYROID STIM HORMONE: CPT

## 2024-07-24 PROCEDURE — 99213 OFFICE O/P EST LOW 20 MIN: CPT | Performed by: FAMILY MEDICINE

## 2024-07-24 PROCEDURE — 6360000004 HC RX CONTRAST MEDICATION: Performed by: FAMILY MEDICINE

## 2024-07-24 PROCEDURE — 71260 CT THORAX DX C+: CPT

## 2024-07-24 PROCEDURE — 2580000003 HC RX 258: Performed by: FAMILY MEDICINE

## 2024-07-24 RX ORDER — 0.9 % SODIUM CHLORIDE 0.9 %
100 INTRAVENOUS SOLUTION INTRAVENOUS ONCE
Status: COMPLETED | OUTPATIENT
Start: 2024-07-24 | End: 2024-07-24

## 2024-07-24 RX ORDER — GABAPENTIN 300 MG/1
300 CAPSULE ORAL NIGHTLY
Qty: 30 CAPSULE | Refills: 0 | Status: SHIPPED | OUTPATIENT
Start: 2024-07-24 | End: 2024-08-23

## 2024-07-24 RX ORDER — SODIUM CHLORIDE 0.9 % (FLUSH) 0.9 %
10 SYRINGE (ML) INJECTION PRN
Status: DISCONTINUED | OUTPATIENT
Start: 2024-07-24 | End: 2024-07-27 | Stop reason: HOSPADM

## 2024-07-24 RX ADMIN — SODIUM CHLORIDE, PRESERVATIVE FREE 10 ML: 5 INJECTION INTRAVENOUS at 13:43

## 2024-07-24 RX ADMIN — IOPAMIDOL 75 ML: 755 INJECTION, SOLUTION INTRAVENOUS at 13:43

## 2024-07-24 RX ADMIN — SODIUM CHLORIDE 100 ML: 9 INJECTION, SOLUTION INTRAVENOUS at 13:43

## 2024-07-24 NOTE — PROGRESS NOTES
ill-appearing.   HENT:      Head: Normocephalic and atraumatic.      Right Ear: External ear normal.      Left Ear: External ear normal.   Eyes:      General: No scleral icterus.        Right eye: No discharge.         Left eye: No discharge.      Conjunctiva/sclera: Conjunctivae normal.   Neck:      Thyroid: No thyromegaly.      Trachea: No tracheal deviation.   Cardiovascular:      Rate and Rhythm: Normal rate and regular rhythm.      Heart sounds: Normal heart sounds.   Pulmonary:      Effort: Pulmonary effort is normal. No respiratory distress.      Breath sounds: Normal breath sounds. No wheezing.   Lymphadenopathy:      Cervical: No cervical adenopathy.   Skin:     General: Skin is warm.      Findings: No rash.   Neurological:      Mental Status: She is alert and oriented to person, place, and time.   Psychiatric:         Mood and Affect: Mood normal.         Behavior: Behavior normal.         Thought Content: Thought content normal.         Assessment/Plan:   1. Tachycardia  -     CT CHEST PULMONARY EMBOLISM W CONTRAST; Future  -     TSH; Future  2. Pain of right lower extremity  -     CT CHEST PULMONARY EMBOLISM W CONTRAST; Future  -     TSH; Future  3. Elevated d-dimer  -     CT CHEST PULMONARY EMBOLISM W CONTRAST; Future  -     TSH; Future       No follow-ups on file.  Data Unavailable     Orders Placed This Encounter   Procedures    CT CHEST PULMONARY EMBOLISM W CONTRAST     Standing Status:   Future     Standing Expiration Date:   7/24/2025     Order Specific Question:   Additional Contrast?     Answer:   Radiologist Recommendation     Order Specific Question:   STAT Creatinine as needed:     Answer:   No     Comments:   had done 7/21     Order Specific Question:   Reason for exam:     Answer:   chest and back pain, leg pain, and elevated d dimer    TSH     Standing Status:   Future     Standing Expiration Date:   7/25/2025     Orders Placed This Encounter   Medications    gabapentin (NEURONTIN) 300 MG

## 2024-08-26 ENCOUNTER — HOSPITAL ENCOUNTER (OUTPATIENT)
Age: 54
Discharge: HOME | End: 2024-08-26
Payer: COMMERCIAL

## 2024-08-26 DIAGNOSIS — N18.2 CKD (CHRONIC KIDNEY DISEASE), STAGE II: ICD-10-CM

## 2024-08-26 DIAGNOSIS — Q61.2 AUTOSOMAL DOMINANT POLYCYSTIC KIDNEY DISEASE: ICD-10-CM

## 2024-08-26 DIAGNOSIS — Z94.0 RENAL TRANSPLANT RECIPIENT: ICD-10-CM

## 2024-08-26 DIAGNOSIS — I12.9 BENIGN HYPERTENSION WITH CKD (CHRONIC KIDNEY DISEASE), STAGE II: ICD-10-CM

## 2024-08-26 DIAGNOSIS — N18.2 BENIGN HYPERTENSION WITH CKD (CHRONIC KIDNEY DISEASE), STAGE II: ICD-10-CM

## 2024-08-26 LAB
ANION GAP SERPL CALCULATED.3IONS-SCNC: 12 MMOL/L (ref 9–17)
BACTERIA URNS QL MICRO: ABNORMAL
BASOPHILS # BLD: 0 K/UL (ref 0–0.2)
BASOPHILS NFR BLD: 1 % (ref 0–2)
BILIRUB UR QL STRIP: NEGATIVE
BUN SERPL-MCNC: 16 MG/DL (ref 6–20)
CALCIUM SERPL-MCNC: 9.4 MG/DL (ref 8.6–10.4)
CASTS #/AREA URNS LPF: ABNORMAL /LPF
CHLORIDE SERPL-SCNC: 104 MMOL/L (ref 98–107)
CLARITY UR: ABNORMAL
CO2 SERPL-SCNC: 21 MMOL/L (ref 20–31)
COLOR UR: YELLOW
CREAT SERPL-MCNC: 0.9 MG/DL (ref 0.5–0.9)
EOSINOPHIL # BLD: 0.2 K/UL (ref 0–0.4)
EOSINOPHILS RELATIVE PERCENT: 4 % (ref 0–4)
EPI CELLS #/AREA URNS HPF: ABNORMAL /HPF
ERYTHROCYTE [DISTWIDTH] IN BLOOD BY AUTOMATED COUNT: 13.2 % (ref 11.5–14.9)
GFR, ESTIMATED: 75 ML/MIN/1.73M2
GLUCOSE SERPL-MCNC: 126 MG/DL (ref 70–99)
GLUCOSE UR STRIP-MCNC: NEGATIVE MG/DL
HCT VFR BLD AUTO: 40.7 % (ref 36–46)
HGB BLD-MCNC: 13.6 G/DL (ref 12–16)
HGB UR QL STRIP.AUTO: NEGATIVE
KETONES UR STRIP-MCNC: NEGATIVE MG/DL
LEUKOCYTE ESTERASE UR QL STRIP: NEGATIVE
LYMPHOCYTES NFR BLD: 1.6 K/UL (ref 1–4.8)
LYMPHOCYTES RELATIVE PERCENT: 32 % (ref 24–44)
MAGNESIUM SERPL-MCNC: 1.5 MG/DL (ref 1.6–2.6)
MCH RBC QN AUTO: 30.8 PG (ref 26–34)
MCHC RBC AUTO-ENTMCNC: 33.4 G/DL (ref 31–37)
MCV RBC AUTO: 92.1 FL (ref 80–100)
MONOCYTES NFR BLD: 0.3 K/UL (ref 0.1–1.3)
MONOCYTES NFR BLD: 6 % (ref 1–7)
NEUTROPHILS NFR BLD: 57 % (ref 36–66)
NEUTS SEG NFR BLD: 3 K/UL (ref 1.3–9.1)
NITRITE UR QL STRIP: NEGATIVE
PH UR STRIP: 5 [PH] (ref 5–8)
PHOSPHATE SERPL-MCNC: 2.5 MG/DL (ref 2.6–4.5)
PLATELET # BLD AUTO: 173 K/UL (ref 150–450)
PMV BLD AUTO: 7.8 FL (ref 6–12)
POTASSIUM SERPL-SCNC: 4.6 MMOL/L (ref 3.7–5.3)
PROT UR STRIP-MCNC: NEGATIVE MG/DL
RBC # BLD AUTO: 4.42 M/UL (ref 4–5.2)
RBC #/AREA URNS HPF: ABNORMAL /HPF
SODIUM SERPL-SCNC: 137 MMOL/L (ref 135–144)
SP GR UR STRIP: 1.02 (ref 1–1.03)
UROBILINOGEN UR STRIP-ACNC: NORMAL EU/DL (ref 0–1)
WBC #/AREA URNS HPF: ABNORMAL /HPF
WBC OTHER # BLD: 5.2 K/UL (ref 3.5–11)

## 2024-08-26 PROCEDURE — 83735 ASSAY OF MAGNESIUM: CPT

## 2024-08-26 PROCEDURE — 80048 BASIC METABOLIC PNL TOTAL CA: CPT

## 2024-08-26 PROCEDURE — 36415 COLL VENOUS BLD VENIPUNCTURE: CPT

## 2024-08-26 PROCEDURE — 85025 COMPLETE CBC W/AUTO DIFF WBC: CPT

## 2024-08-26 PROCEDURE — 80197 ASSAY OF TACROLIMUS: CPT

## 2024-08-26 PROCEDURE — 84100 ASSAY OF PHOSPHORUS: CPT

## 2024-08-26 PROCEDURE — 81001 URINALYSIS AUTO W/SCOPE: CPT

## 2024-08-28 ENCOUNTER — HOSPITAL ENCOUNTER (OUTPATIENT)
Age: 54
Setting detail: SPECIMEN
Discharge: HOME | End: 2024-08-28
Payer: COMMERCIAL

## 2024-08-28 DIAGNOSIS — Z94.0 HISTORY OF RENAL TRANSPLANT: ICD-10-CM

## 2024-08-28 DIAGNOSIS — E83.39 HYPOPHOSPHATEMIA: ICD-10-CM

## 2024-08-28 DIAGNOSIS — Z94.0 RENAL TRANSPLANT RECIPIENT: ICD-10-CM

## 2024-08-28 DIAGNOSIS — N18.2 CKD (CHRONIC KIDNEY DISEASE), STAGE II: ICD-10-CM

## 2024-08-28 DIAGNOSIS — N18.2 BENIGN HYPERTENSION WITH CKD (CHRONIC KIDNEY DISEASE), STAGE II: ICD-10-CM

## 2024-08-28 DIAGNOSIS — E83.42 HYPOMAGNESEMIA: ICD-10-CM

## 2024-08-28 DIAGNOSIS — R82.71 BACTERIA IN URINE: ICD-10-CM

## 2024-08-28 DIAGNOSIS — I12.9 BENIGN HYPERTENSION WITH CKD (CHRONIC KIDNEY DISEASE), STAGE II: ICD-10-CM

## 2024-08-28 LAB — TACROLIMUS BLD-MCNC: 4.4 NG/ML

## 2024-08-28 PROCEDURE — 87086 URINE CULTURE/COLONY COUNT: CPT

## 2024-08-29 ENCOUNTER — HOSPITAL ENCOUNTER (OUTPATIENT)
Age: 54
Setting detail: SPECIMEN
Discharge: HOME | End: 2024-08-29

## 2024-08-29 ENCOUNTER — OFFICE VISIT (OUTPATIENT)
Dept: GYNECOLOGIC ONCOLOGY | Age: 54
End: 2024-08-29
Payer: COMMERCIAL

## 2024-08-29 VITALS
WEIGHT: 272.8 LBS | DIASTOLIC BLOOD PRESSURE: 100 MMHG | OXYGEN SATURATION: 96 % | HEIGHT: 69 IN | HEART RATE: 81 BPM | SYSTOLIC BLOOD PRESSURE: 143 MMHG | BODY MASS INDEX: 40.4 KG/M2

## 2024-08-29 DIAGNOSIS — N89.8 VAGINAL DISCHARGE: ICD-10-CM

## 2024-08-29 DIAGNOSIS — N95.2 ATROPHIC VAGINITIS: ICD-10-CM

## 2024-08-29 DIAGNOSIS — N90.3 VULVAR DYSPLASIA: Primary | ICD-10-CM

## 2024-08-29 LAB
MICROORGANISM SPEC CULT: NORMAL
SERVICE CMNT-IMP: NORMAL
SPECIMEN DESCRIPTION: NORMAL

## 2024-08-29 PROCEDURE — 56820 COLPOSCOPY VULVA: CPT | Performed by: PHYSICIAN ASSISTANT

## 2024-08-29 RX ORDER — ESTRADIOL 0.1 MG/G
CREAM VAGINAL
Qty: 42.5 EACH | Refills: 1 | Status: SHIPPED | OUTPATIENT
Start: 2024-08-29

## 2024-08-29 ASSESSMENT — ENCOUNTER SYMPTOMS
GASTROINTESTINAL NEGATIVE: 1
EYES NEGATIVE: 1
RESPIRATORY NEGATIVE: 1

## 2024-08-29 NOTE — PROGRESS NOTES
[facility]  [address]  [place] [address]    Daiana Mahmood present for her vulvar dysplasia surveillance visit.     CC/HPI:  She is a  female who initially saw her local OB/GYN provider in 2021 for her annual exam she had concerns of a labial soreness that was present for approximately 2 months.      She has a surgical history pertinent for hysterectomy secondary to \"heavy menses\".  Recalls abnormal Pap smears in her 20s and she believes she had to have \"laser therapy\".    Her Gynecologic provider Ashley Berman NP obtained a vaginal Pap smear on 21 which later revealed low-grade CHARIS, HPV negative. Vulvar biopsy later returned positive for DARRELL 3 and she was referred to [facility] for further evaluation and treatment.     Her medical history is significant for polycystic kidney disease and she has a renal transplant receipt currently on long term use of immunosuppressant medication Prograf. She is a former smoker, cessation over 30 years ago    She was seen by [facility] gy[facility] oncology and ultimately underwent a right natali vulvectomy with a partial vulvectomy using ultrasonic scalpel on 10/6/2021.  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  The margins were treated with ultrasonic scalpel at the time of the excision.    She has follow up surveillance pelvic examinations and colposcopy with serial visits and treatment for her vulvar dysplasia.     2022 underwent exam under anesthesia, colposcopy with partial vulvectomy using ultrasonic scalpel vulvar lesions. 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 Vaginal colposcopy with negative colposcopic impression.    She has used

## 2024-08-29 NOTE — PROGRESS NOTES
Review of Systems   Constitutional: Negative.    HENT:  Negative.     Eyes: Negative.    Respiratory: Negative.     Cardiovascular: Negative.    Gastrointestinal: Negative.    Endocrine: Positive for hot flashes (night sweats).   Genitourinary:  Positive for difficulty urinating (having urine culture) and pelvic pain (discomfort).    Musculoskeletal: Negative.    Skin: Negative.    Neurological:  Positive for headaches.   Hematological:  Bruises/bleeds easily.

## 2024-08-30 LAB
CANDIDA SPECIES: NEGATIVE
GARDNERELLA VAGINALIS: POSITIVE
SOURCE: ABNORMAL
TRICHOMONAS: NEGATIVE

## 2024-08-30 RX ORDER — METRONIDAZOLE 7.5 MG/G
1 GEL VAGINAL NIGHTLY
Qty: 70 G | Refills: 0 | Status: SHIPPED | OUTPATIENT
Start: 2024-08-30 | End: 2024-09-04

## 2024-11-04 ENCOUNTER — OFFICE VISIT (OUTPATIENT)
Dept: GASTROENTEROLOGY | Age: 54
End: 2024-11-04
Payer: COMMERCIAL

## 2024-11-04 VITALS
SYSTOLIC BLOOD PRESSURE: 129 MMHG | TEMPERATURE: 98.1 F | BODY MASS INDEX: 40.76 KG/M2 | DIASTOLIC BLOOD PRESSURE: 83 MMHG | WEIGHT: 276 LBS

## 2024-11-04 DIAGNOSIS — R10.13 EPIGASTRIC BURNING SENSATION: ICD-10-CM

## 2024-11-04 DIAGNOSIS — K58.2 IRRITABLE BOWEL SYNDROME WITH BOTH CONSTIPATION AND DIARRHEA: ICD-10-CM

## 2024-11-04 DIAGNOSIS — E66.9 MODERATE OBESITY: ICD-10-CM

## 2024-11-04 DIAGNOSIS — K59.09 OTHER CONSTIPATION: ICD-10-CM

## 2024-11-04 DIAGNOSIS — K31.84 GASTROPARESIS: ICD-10-CM

## 2024-11-04 DIAGNOSIS — K21.9 GASTROESOPHAGEAL REFLUX DISEASE, UNSPECIFIED WHETHER ESOPHAGITIS PRESENT: ICD-10-CM

## 2024-11-04 DIAGNOSIS — Z94.0 RENAL TRANSPLANT, STATUS POST: ICD-10-CM

## 2024-11-04 DIAGNOSIS — Z94.0 RENAL TRANSPLANT RECIPIENT: ICD-10-CM

## 2024-11-04 DIAGNOSIS — D36.9 TUBULAR ADENOMA: Primary | ICD-10-CM

## 2024-11-04 DIAGNOSIS — R63.5 WEIGHT GAIN: ICD-10-CM

## 2024-11-04 PROCEDURE — 3079F DIAST BP 80-89 MM HG: CPT | Performed by: INTERNAL MEDICINE

## 2024-11-04 PROCEDURE — 99214 OFFICE O/P EST MOD 30 MIN: CPT | Performed by: INTERNAL MEDICINE

## 2024-11-04 PROCEDURE — 3074F SYST BP LT 130 MM HG: CPT | Performed by: INTERNAL MEDICINE

## 2024-11-04 RX ORDER — FAMOTIDINE 20 MG/1
20 TABLET, FILM COATED ORAL 2 TIMES DAILY
Qty: 180 TABLET | Refills: 3 | Status: SHIPPED | OUTPATIENT
Start: 2024-11-04

## 2024-11-04 ASSESSMENT — ENCOUNTER SYMPTOMS
CONSTIPATION: 0
NAUSEA: 1
DIARRHEA: 0
WHEEZING: 0
VOMITING: 0
ABDOMINAL PAIN: 1
COUGH: 0
CHOKING: 0
ABDOMINAL DISTENTION: 0
ANAL BLEEDING: 0
BLOOD IN STOOL: 0
RECTAL PAIN: 0
TROUBLE SWALLOWING: 0
VOICE CHANGE: 0
SHORTNESS OF BREATH: 0
SORE THROAT: 0

## 2024-11-04 NOTE — PROGRESS NOTES
GI CLINIC FOLLOW UP    NTERVAL HISTORY:   No referring provider defined for this encounter.    Chief Complaint   Patient presents with    Gastroesophageal Reflux     Patient is here today for a f/u last seen on 3/4/24 for gastroparesis, GERD, & renal transplant.       1. Tubular adenoma    2. Gastroparesis    3. Epigastric burning sensation    4. Moderate obesity    5. Renal transplant, status post    6. Gastroesophageal reflux disease, unspecified whether esophagitis present    7. Other constipation    8. Irritable bowel syndrome with both constipation and diarrhea    9. Renal transplant recipient      Patient seen my office after 8 months as a follow-up  Has history significant for renal transplant  Was found to have gastroparesis  Overall was doing okay  She had lost 6 pounds last time and she was  seen in the office  Unfortunately this time she had gained a lot of weight  Some intermittent constipation issues  No bleeding no melena  Has history for colon polyps  Mild nausea especially in the morning but no vomiting  No worsening of gastroparesis  No smoking alcohol abuse illicit drug usage  Taken famotidine for GERD symptoms  Denies any dysphagia  records were reviewed with      HISTORY OF PRESENT ILLNESS: Ms.Lorie RAMAKRISHNA Mahmood is a 54 y.o. female with a past history remarkable for , referred for evaluation of   Chief Complaint   Patient presents with    Gastroesophageal Reflux     Patient is here today for a f/u last seen on 3/4/24 for gastroparesis, GERD, & renal transplant.   .    Past Medical,Family, and Social History reviewed and does contribute to the patient presenting condition.    Patient's PMH/PSH,SH,PSYCH Hx, MEDs, ALLERGIES, and ROS were all reviewed and updated in the appropriate sections.    PAST MEDICAL HISTORY:  Past Medical History:   Diagnosis Date    Abdominal pain 05/29/2010    Acute cystitis 10/21/2015    Acute pancreatitis 10/21/2015    Anxiety     Arm pain 10/21/2015    Arm

## 2025-01-09 ENCOUNTER — TELEPHONE (OUTPATIENT)
Dept: GYNECOLOGIC ONCOLOGY | Age: 55
End: 2025-01-09

## 2025-04-10 ENCOUNTER — OFFICE VISIT (OUTPATIENT)
Dept: GYNECOLOGIC ONCOLOGY | Age: 55
End: 2025-04-10
Payer: COMMERCIAL

## 2025-04-10 VITALS
HEART RATE: 78 BPM | DIASTOLIC BLOOD PRESSURE: 95 MMHG | TEMPERATURE: 97.3 F | SYSTOLIC BLOOD PRESSURE: 146 MMHG | OXYGEN SATURATION: 97 % | BODY MASS INDEX: 40.14 KG/M2 | HEIGHT: 69 IN | WEIGHT: 271 LBS

## 2025-04-10 DIAGNOSIS — N95.1 VASOMOTOR SYMPTOMS DUE TO MENOPAUSE: ICD-10-CM

## 2025-04-10 DIAGNOSIS — R45.89 DEPRESSED MOOD: ICD-10-CM

## 2025-04-10 DIAGNOSIS — N90.3 VULVAR DYSPLASIA: Primary | ICD-10-CM

## 2025-04-10 DIAGNOSIS — R23.2 HOT FLASHES: ICD-10-CM

## 2025-04-10 DIAGNOSIS — N95.2 ATROPHIC VAGINITIS: ICD-10-CM

## 2025-04-10 PROCEDURE — 3077F SYST BP >= 140 MM HG: CPT | Performed by: PHYSICIAN ASSISTANT

## 2025-04-10 PROCEDURE — 3080F DIAST BP >= 90 MM HG: CPT | Performed by: PHYSICIAN ASSISTANT

## 2025-04-10 PROCEDURE — 99214 OFFICE O/P EST MOD 30 MIN: CPT | Performed by: PHYSICIAN ASSISTANT

## 2025-04-10 PROCEDURE — 56820 COLPOSCOPY VULVA: CPT | Performed by: PHYSICIAN ASSISTANT

## 2025-04-10 RX ORDER — VENLAFAXINE HYDROCHLORIDE 37.5 MG/1
CAPSULE, EXTENDED RELEASE ORAL
Qty: 67 CAPSULE | Refills: 0 | Status: SHIPPED | OUTPATIENT
Start: 2025-04-10 | End: 2025-05-17

## 2025-04-10 RX ORDER — ESTRADIOL 0.1 MG/G
CREAM VAGINAL
Qty: 42.5 EACH | Refills: 1 | Status: SHIPPED | OUTPATIENT
Start: 2025-04-10

## 2025-04-10 ASSESSMENT — ENCOUNTER SYMPTOMS
NAUSEA: 1
RESPIRATORY NEGATIVE: 1
BACK PAIN: 1
EYES NEGATIVE: 1

## 2025-04-10 NOTE — PROGRESS NOTES
Review of Systems   Constitutional:  Positive for chills.   HENT:  Negative.     Eyes: Negative.    Respiratory: Negative.     Cardiovascular: Negative.    Gastrointestinal:  Positive for nausea.   Endocrine: Positive for hot flashes.   Genitourinary:  Positive for frequency.    Musculoskeletal:  Positive for back pain.   Skin:  Positive for itching (vaginal).   Neurological:  Positive for light-headedness.   Hematological:  Bruises/bleeds easily.      
providing care to the patient, counseling and coordinating care, discussing the patient's current situation, reviewing her options, counseling and education her and answering her questions.  The patient's pertinent images, labs, and previous records and procedures were reviewed.    Electronically signed by Delia Desai PA-C on 4/10/2025 at 2:02 PM

## 2025-04-11 NOTE — RESULT ENCOUNTER NOTE
Adv pt results okay- no blood clot Take the medications as prescribed. Return for any worsening or new symptoms. Follow up with Primary Care Provider in the next 2-3 days.

## 2025-05-02 DIAGNOSIS — N95.1 VASOMOTOR SYMPTOMS DUE TO MENOPAUSE: ICD-10-CM

## 2025-05-02 DIAGNOSIS — R45.89 DEPRESSED MOOD: ICD-10-CM

## 2025-05-03 RX ORDER — VENLAFAXINE HYDROCHLORIDE 37.5 MG/1
75 CAPSULE, EXTENDED RELEASE ORAL DAILY
Qty: 180 CAPSULE | Refills: 2 | Status: SHIPPED | OUTPATIENT
Start: 2025-05-03

## 2025-07-08 ENCOUNTER — TELEPHONE (OUTPATIENT)
Dept: GYNECOLOGIC ONCOLOGY | Age: 55
End: 2025-07-08

## 2025-07-08 ENCOUNTER — TELEPHONE (OUTPATIENT)
Dept: GASTROENTEROLOGY | Age: 55
End: 2025-07-08

## 2025-07-08 NOTE — TELEPHONE ENCOUNTER
1st attempt to reschedule appt on 08/12/2025 due to provider being out of office. Mailbox is full.

## 2025-07-08 NOTE — TELEPHONE ENCOUNTER
Attempt to call patient for reminder to have labs drawn prior to upcoming appointment. No answer and VM was full. Could not leave a message.

## 2025-07-09 NOTE — TELEPHONE ENCOUNTER
2nd attempt to reschedule appt on 08/12/2025 due to provider being out of office. Mailbox is full.

## 2025-07-10 NOTE — TELEPHONE ENCOUNTER
3rd attempt to reschedule appt on 08/12/2025 due to provider being out of office. Mailbox is full.

## (undated) DEVICE — JAR BONE ST

## (undated) DEVICE — SYRINGE MED 10ML TRNSLUC BRL PLUNG BLK MRK POLYPR CTRL

## (undated) DEVICE — GLOVE ORANGE PI 7   MSG9070

## (undated) DEVICE — GLOVE SURG SZ 65 THK91MIL LTX FREE SYN POLYISOPRENE

## (undated) DEVICE — INTENDED FOR TISSUE SEPARATION, AND OTHER PROCEDURES THAT REQUIRE A SHARP SURGICAL BLADE TO PUNCTURE OR CUT.: Brand: BARD-PARKER ® CARBON RIB-BACK BLADES

## (undated) DEVICE — COVER,MAYO STAND,STERILE: Brand: MEDLINE

## (undated) DEVICE — GLOVE ORANGE PI 7 1/2   MSG9075

## (undated) DEVICE — SOCK SPEC SHT 4.5 IN UNIV CANSTR COMPATIBILITY

## (undated) DEVICE — UNDERPANTS MAT L XL SEAMLESS CLR CODE WAISTBAND KNIT

## (undated) DEVICE — MITT SURG PREP L ADH DISPOSABLE

## (undated) DEVICE — MEDICINE CUP, GRADUATED, STER: Brand: MEDLINE

## (undated) DEVICE — GLOVE SURG 8 11.7IN BEAD CUF LIGHT BRN SENSICARE LTX FREE

## (undated) DEVICE — SMOKE EVACUATION TUBING WITH 7/8 IN TO 1/4 IN REDUCER: Brand: BUFFALO FILTER

## (undated) DEVICE — GAUZE,SPONGE,4"X4",16PLY,STRL,LF,10/TRAY: Brand: MEDLINE

## (undated) DEVICE — CONTAINER,SPECIMEN,4OZ,OR STRL: Brand: MEDLINE

## (undated) DEVICE — 1016 S-DRAPE IRRIG POUCH 10/BOX: Brand: STERI-DRAPE™

## (undated) DEVICE — GLOVE SURG SZ 8 L12IN FNGR THK79MIL GRN LTX FREE

## (undated) DEVICE — SYRINGE MED 50ML LUERLOCK TIP

## (undated) DEVICE — SUTURE VCRL SZ 3-0 L27IN ABSRB UD L26MM SH 1/2 CIR J416H

## (undated) DEVICE — COUNTER NDL 10 COUNT HLD 20 FOAM BLK SGL MAG

## (undated) DEVICE — APPLICATOR COTTON TIP STRL 5/PK

## (undated) DEVICE — GOWN POLY REINF SONT XLG: Brand: MEDLINE INDUSTRIES, INC.

## (undated) DEVICE — ADAPTER TBNG LUER STUB 15 GA INTMED

## (undated) DEVICE — SVMMC GYN MIN PK

## (undated) DEVICE — TUBING, SUCTION, 9/32" X 20', STRAIGHT: Brand: MEDLINE INDUSTRIES, INC.

## (undated) DEVICE — SYRINGE MED 10ML LUERLOCK TIP W/O SFTY DISP

## (undated) DEVICE — COVER OR TBL W40XL90IN ABSRB STD AND GRIPPY BK SAHARA

## (undated) DEVICE — CONNECTOR,TUBING,5-IN-1,NON-STERILE: Brand: MEDLINE INDUSTRIES, INC.

## (undated) DEVICE — JELLY,LUBE,STERILE,FLIP TOP,TUBE,2-OZ: Brand: MEDLINE

## (undated) DEVICE — SHEET,DRAPE,70X100,STERILE: Brand: MEDLINE

## (undated) DEVICE — STRAP ARMBRD W1.5XL32IN FOAM STR YET SFT W/ HK AND LOOP

## (undated) DEVICE — RENTAL EVACUATOR SMOKE

## (undated) DEVICE — RENTAL LASER CO2 CASE

## (undated) DEVICE — GOWN,SIRUS,NONRNF,SETINSLV,XL,20/CS: Brand: MEDLINE

## (undated) DEVICE — YANKAUER,FLEXIBLE HANDLE,REGLR CAPACITY: Brand: MEDLINE INDUSTRIES, INC.

## (undated) DEVICE — GOWN,AURORA,NONREINFORCED,LARGE: Brand: MEDLINE

## (undated) DEVICE — TUBING SET 1.9 MM NOTCH STD SHT TIP NEXUS SONASTAR

## (undated) DEVICE — GLOVE ORTHO 8   MSG9480

## (undated) DEVICE — SUTURE PERMAHAND SZ 3-0 L30IN NONABSORBABLE BLK SH L26MM K832H

## (undated) DEVICE — PAD,SANITARY,11 IN,MAXI,W/WINGS,N-STRL: Brand: MEDLINE

## (undated) DEVICE — PENCIL ES L3M BTTN SWCH HOLSTER W/ BLDE ELECTRD EDGE

## (undated) DEVICE — SOLUTION SCRB 4OZ 4% CHG H2O AIDED FOR PREOPERATIVE SKIN

## (undated) DEVICE — DRAPE,REIN 53X77,STERILE: Brand: MEDLINE

## (undated) DEVICE — SOLUTION PREP POVIDONE IOD FOR SKIN MUCOUS MEM PRIOR TO

## (undated) DEVICE — NEPTUNE E-SEP SMOKE EVACUATION PENCIL, COATED, 70MM BLADE, PUSH BUTTON SWITCH: Brand: NEPTUNE E-SEP

## (undated) DEVICE — SOLUTION SCRB 4OZ 10% POVIDONE IOD ANTIMIC BTL

## (undated) DEVICE — BITEBLOCK ENDOSCP 60FR MAXI WHT POLYETH STURDY W/ VELC WVN

## (undated) DEVICE — GARMENT,MEDLINE,DVT,INT,CALF,MED, GEN2: Brand: MEDLINE

## (undated) DEVICE — STRAP,POSITIONING,KNEE/BODY,FOAM,4X60": Brand: MEDLINE

## (undated) DEVICE — SUTURE PROL SZ 3-0 L30IN NONABSORBABLE BLU L26MM SH 1/2 CIR 8832H

## (undated) DEVICE — COVER,LIGHT HANDLE,FLX,2/PK: Brand: MEDLINE INDUSTRIES, INC.

## (undated) DEVICE — HYPODERMIC SAFETY NEEDLE: Brand: MAGELLAN

## (undated) DEVICE — NEEDLE SPINAL 22GA L3.5IN SPINOCAN

## (undated) DEVICE — SYRINGE,CONTROL,LL,FINGER,GRIP: Brand: MEDLINE INDUSTRIES, INC.

## (undated) DEVICE — SUTURE ABSRB BRAID COAT VLT SH 3-0 27IN VCRL J311H

## (undated) DEVICE — CO2 CANNULA,SUPERSOFT, ADLT,7'O2,7'CO2: Brand: MEDLINE

## (undated) DEVICE — FORCEPS BX L240CM JAW DIA2.8MM L CAP W/ NDL MIC MESH TOOTH

## (undated) DEVICE — DRAPE,UNDRBUT,WHT GRAD PCH,CAPPORT,20/CS: Brand: MEDLINE

## (undated) DEVICE — COUNTER NDL 40 COUNT HLD 70 FOAM BLK ADH W/ MAG

## (undated) DEVICE — GLOVE SURG SZ 6 THK91MIL LTX FREE SYN POLYISOPRENE ANTI

## (undated) DEVICE — PATIENT RETURN ELECTRODE, SINGLE-USE, CONTACT QUALITY MONITORING, ADULT, WITH 9FT CORD, FOR PATIENTS WEIGING OVER 33LBS. (15KG): Brand: MEGADYNE

## (undated) DEVICE — VINEGAR      6GAL/CS

## (undated) DEVICE — BASIN EMSIS 700ML GRAPHITE PLAS KID SHP GRAD

## (undated) DEVICE — BLADE CLIPPER GEN PURP NS